# Patient Record
Sex: FEMALE | Race: WHITE | HISPANIC OR LATINO | Employment: UNEMPLOYED | URBAN - METROPOLITAN AREA
[De-identification: names, ages, dates, MRNs, and addresses within clinical notes are randomized per-mention and may not be internally consistent; named-entity substitution may affect disease eponyms.]

---

## 2017-01-06 ENCOUNTER — OFFICE VISIT (OUTPATIENT)
Dept: MATERNAL FETAL MEDICINE | Facility: CLINIC | Age: 27
End: 2017-01-06
Payer: MEDICAID

## 2017-01-06 ENCOUNTER — LAB VISIT (OUTPATIENT)
Dept: LAB | Facility: OTHER | Age: 27
End: 2017-01-06
Attending: OBSTETRICS & GYNECOLOGY
Payer: MEDICAID

## 2017-01-06 VITALS — BODY MASS INDEX: 28.43 KG/M2 | WEIGHT: 155.44 LBS

## 2017-01-06 DIAGNOSIS — Z34.81 ENCOUNTER FOR SUPERVISION OF OTHER NORMAL PREGNANCY IN FIRST TRIMESTER: ICD-10-CM

## 2017-01-06 DIAGNOSIS — Z36.9 ENCOUNTER FOR ANTENATAL SCREENING: ICD-10-CM

## 2017-01-06 DIAGNOSIS — Z36.89 ENCOUNTER FOR FETAL ANATOMIC SURVEY: Primary | ICD-10-CM

## 2017-01-06 PROCEDURE — 76801 OB US < 14 WKS SINGLE FETUS: CPT | Mod: 26,S$PBB,, | Performed by: PEDIATRICS

## 2017-01-06 PROCEDURE — 76813 OB US NUCHAL MEAS 1 GEST: CPT | Mod: 26,S$PBB,, | Performed by: PEDIATRICS

## 2017-01-06 PROCEDURE — 99499 UNLISTED E&M SERVICE: CPT | Mod: S$PBB,,, | Performed by: PEDIATRICS

## 2017-01-06 PROCEDURE — 81508 FTL CGEN ABNOR TWO PROTEINS: CPT

## 2017-01-06 PROCEDURE — 36415 COLL VENOUS BLD VENIPUNCTURE: CPT

## 2017-01-06 NOTE — PROGRESS NOTES
Indication: Nuchal Translucency (Dr Katarzyna Dexter).   Maternal age (26 years).   ____________________________________________________________________________  History: Age: 26 years.  ____________________________________________________________________________  Dating:  LMP: 10/09/16 EDC: 07/16/17 GA by LMP: 12w5d  Current Scan on: 01/06/17 EDC: 07/12/17 GA by current scan: 13w2d  Best Overall Assessment: 12/07/16 EDC: 07/16/17 Assessed GA: 12w5d  The calculation of the gestational age by current scan was based on CRL.  The Best Overall Assessment is based on the LMP.  ____________________________________________________________________________  First Trimester Scan:  Cotton gestation.  Biometry:  CRL 71.5 mm 84th% 13w2d (12w6d to 13w6d)  NT 1.80 mm  Additional Markers for Aneuploidies: Nasal bone present.  Fetal Anatomy:  Skull / Brain: appears normal. Abdomen: appears normal. Stomach: visible. Bladder: visible. Hands: both visible. Feet: both visible.      Fetal heart activity: present. Fetal heart rate: 155 bpm.   Amniotic fluid: normal.   Cord: 3 vessels.     Summary of Ultrasound Findings:  Transabdominal US. U/S machine: [employer].   Impression: normal intrauterine pregnancy.     ____________________________________________________________________________  Maternal Structures:  Uterus: Midline echo: intact.    Right Ovary: not visible.   Left Ovary: not visible.  Cul de Sac / Pouch of Sunil: no free fluid visible.  ____________________________________________________________________________  Report Summary:  Impression:   Single viable intrauterine pregnancy consistent with established dating.  Fetus grossly WNL with normal cardiac activity.    No evidence of nuchal translucency thickening visualized today.  NT is normal and nasal bone is present.  Normal uterus, cervix and adnexae as noted above.     Recommendations: First portion of sequential screening completed today. Results to be sent to  primary pregnancy care provider. Recommend to complete second blood draw beyond 15 weeks EGA of pregnancy. Ultrasound for fetal anatomical survey scheduled by [facility] today for 19-20 weeks EGA.       Thanks once again for allowing us to participate in the care of your patients.  If you have any questions concerning today's consultation feel free to contact me or one of my partners.  We can be reached at [phone] during normal business hours.  If you have a question after normal business hours, please contact Labor and Delivery [phone] and the unit secretary will page our on call physician.

## 2017-01-09 ENCOUNTER — ROUTINE PRENATAL (OUTPATIENT)
Dept: OBSTETRICS AND GYNECOLOGY | Facility: CLINIC | Age: 27
End: 2017-01-09
Attending: OBSTETRICS & GYNECOLOGY
Payer: MEDICAID

## 2017-01-09 VITALS
DIASTOLIC BLOOD PRESSURE: 64 MMHG | SYSTOLIC BLOOD PRESSURE: 108 MMHG | WEIGHT: 152.75 LBS | BODY MASS INDEX: 27.94 KG/M2

## 2017-01-09 DIAGNOSIS — G35 MS (MULTIPLE SCLEROSIS): Primary | ICD-10-CM

## 2017-01-09 DIAGNOSIS — Z34.91 ENCOUNTER FOR SUPERVISION OF NORMAL PREGNANCY IN FIRST TRIMESTER, UNSPECIFIED GRAVIDITY: ICD-10-CM

## 2017-01-09 DIAGNOSIS — R10.2 PELVIC PRESSURE IN FEMALE: ICD-10-CM

## 2017-01-09 DIAGNOSIS — O34.219 PREVIOUS CESAREAN DELIVERY AFFECTING PREGNANCY: ICD-10-CM

## 2017-01-09 DIAGNOSIS — Z23 FLU VACCINE NEED: ICD-10-CM

## 2017-01-09 PROBLEM — Z34.90 SUPERVISION OF NORMAL PREGNANCY: Status: ACTIVE | Noted: 2017-01-09

## 2017-01-09 LAB
B-HCG (M.O.M.) (SS1): NORMAL
CRL MEASUREMENT (SS1): 71.5 MM
DOWN SYNDROME (<1:25) (SS1): NORMAL
DS BASED ON MAT. AGE (SS1): NORMAL
ETHNIC ORIGIN (SS1): NORMAL
GESTATIONAL AGE (DAYS)(SS1): 2
GESTATIONAL AGE (WEEKS)(SS1): 13
HCG (SS1): 67.8 IU/ML
INSULIN DEPEND. DIABETES (SS1): NORMAL
MATERNAL AGE AT EDD (YRS) (SS1): 25
MATERNAL WEIGHT (LBS) (SS1): 155
MULTIPLE GESTATION (SS1): NORMAL
NASAL BONE (SS1): NORMAL
NUCHAL TRANSL. (M.O.M.) (SS1): NORMAL
NUCHAL TRANSLUCENCY (SS1): 1.8 MM
PAPP-A (M.O.M.) (SS1): NORMAL
PAPP-A (SS1): 503.1 NG/ML
SEQ. SCREEN PART 1: NEGATIVE
SEQUENTIAL SCREEN I INTERP.: NORMAL
TRISOMY 18 (<1:100) (SS1): NORMAL
ULTRASOUND DATE (SS1): NORMAL

## 2017-01-09 PROCEDURE — 99999 PR PBB SHADOW E&M-EST. PATIENT-LVL II: CPT | Mod: PBBFAC,,, | Performed by: OBSTETRICS & GYNECOLOGY

## 2017-01-09 PROCEDURE — 99213 OFFICE O/P EST LOW 20 MIN: CPT | Mod: TH,S$PBB,, | Performed by: OBSTETRICS & GYNECOLOGY

## 2017-01-09 PROCEDURE — 87086 URINE CULTURE/COLONY COUNT: CPT

## 2017-01-09 PROCEDURE — 99212 OFFICE O/P EST SF 10 MIN: CPT | Mod: PBBFAC | Performed by: OBSTETRICS & GYNECOLOGY

## 2017-01-09 NOTE — PROGRESS NOTES
Pregnancy at 13w1d with reports of occasional feeling of quickening. Denies LOF or BRB.   Discussed  with risks and benefits.  Advised to take Fe daily

## 2017-01-10 ENCOUNTER — TELEPHONE (OUTPATIENT)
Dept: OBSTETRICS AND GYNECOLOGY | Facility: CLINIC | Age: 27
End: 2017-01-10

## 2017-01-10 LAB — BACTERIA UR CULT: NO GROWTH

## 2017-01-10 NOTE — TELEPHONE ENCOUNTER
Informed pt that serum screening was normal. Pt communicated understanding and denied having additional questions at this moment. Instructed her to call the clinic with additional questions or concerns.

## 2017-01-10 NOTE — TELEPHONE ENCOUNTER
Called, no answer. Left message requesting call back at [phone]. Will inform pt that her serum screening was normal.

## 2017-02-06 ENCOUNTER — LAB VISIT (OUTPATIENT)
Dept: LAB | Facility: OTHER | Age: 27
End: 2017-02-06
Attending: OBSTETRICS & GYNECOLOGY
Payer: MEDICAID

## 2017-02-06 ENCOUNTER — ROUTINE PRENATAL (OUTPATIENT)
Dept: OBSTETRICS AND GYNECOLOGY | Facility: CLINIC | Age: 27
End: 2017-02-06
Attending: OBSTETRICS & GYNECOLOGY
Payer: MEDICAID

## 2017-02-06 VITALS
WEIGHT: 156.06 LBS | DIASTOLIC BLOOD PRESSURE: 74 MMHG | BODY MASS INDEX: 28.55 KG/M2 | SYSTOLIC BLOOD PRESSURE: 122 MMHG

## 2017-02-06 DIAGNOSIS — O99.012 ANEMIA IN PREGNANCY, SECOND TRIMESTER: ICD-10-CM

## 2017-02-06 DIAGNOSIS — G35 MS (MULTIPLE SCLEROSIS): ICD-10-CM

## 2017-02-06 DIAGNOSIS — Z34.82 ENCOUNTER FOR SUPERVISION OF OTHER NORMAL PREGNANCY IN SECOND TRIMESTER: Primary | ICD-10-CM

## 2017-02-06 DIAGNOSIS — Z23 FLU VACCINE NEED: ICD-10-CM

## 2017-02-06 DIAGNOSIS — Z34.82 ENCOUNTER FOR SUPERVISION OF OTHER NORMAL PREGNANCY IN SECOND TRIMESTER: ICD-10-CM

## 2017-02-06 DIAGNOSIS — O34.219 PREVIOUS CESAREAN DELIVERY AFFECTING PREGNANCY: ICD-10-CM

## 2017-02-06 PROCEDURE — 99999 PR PBB SHADOW E&M-EST. PATIENT-LVL II: CPT | Mod: PBBFAC,,, | Performed by: OBSTETRICS & GYNECOLOGY

## 2017-02-06 PROCEDURE — 81511 FTL CGEN ABNOR FOUR ANAL: CPT

## 2017-02-06 PROCEDURE — 99213 OFFICE O/P EST LOW 20 MIN: CPT | Mod: TH,S$PBB,, | Performed by: OBSTETRICS & GYNECOLOGY

## 2017-02-06 PROCEDURE — 36415 COLL VENOUS BLD VENIPUNCTURE: CPT

## 2017-02-06 RX ORDER — FERROUS SULFATE 325(65) MG
325 TABLET ORAL
COMMUNITY
End: 2017-12-08

## 2017-02-06 NOTE — PROGRESS NOTES
Pregnancy at 17w1d with quickening. Needs Second trimester sequential screen. Has anatomy U/S scheduled for Feb 24.

## 2017-02-08 LAB
1ST SAMPLE DATE (SS2): NORMAL
2ND SAMPLE DATE (SS2): NORMAL
ALPHA FETOPROTEIN MATERNAL (SS2): 32.7 NG/ML
DOWN SYNDROME RISK (<1:110) (SS2): NORMAL
ETHNIC ORIGIN (SS2): NORMAL
GEST. AGE (DAYS) 1ST SAMPLE (SS2): 2
GEST. AGE (DAYS) 2ND SAMPLE (SS2): 5
GEST. AGE (WKS) 1ST SAMPLE (SS2): 13
GEST. AGE (WKS) 2ND SAMPLE (SS2): 17
GESTATIONAL AGE METHOD (SS2): NORMAL
HCG (SS2): 29.7 IU/ML
INHIBIN A (SS2): 126.5 PG/ML
INSULIN DEPEND. DIABETES (SS2): NORMAL
M.O.M. AFP  (<2.20) (SS2): 0.81
M.O.M. HCG (SS2): 1.24
M.O.M. INHIBIN A (SS2): 0.77
M.O.M. NUCHAL TRANSLUCENCY (SS2): 1.04
M.O.M. PAPP-A (SS2): 0.42
M.O.M. UNCONJ. ESTRIOL (SS2): 0.97
MATERNAL AGE AT EDD (YRS) (SS2): 25
MATERNAL AGE FOR DOWN (SS2): NORMAL
MATERNAL WEIGHT (LBS) (SS2): 156
MULTIPLE GESTATION (SS2): NORMAL
NASAL BONE (SS2): NORMAL
NUCHAL TRANSLUCENCY (SS2): 1.8 MM
PAPP-A (SS2): 503.1 NG/ML
SEQUENTIAL SCREEN PART 2 INTERP: NORMAL
SEQUENTIAL SCREEN PART 2: NEGATIVE
TRISOMY 18 (<1:100) (SS2): NORMAL
UNCONJUGATED ESTRIOL (SS2): 1.19 NG/ML

## 2017-02-13 ENCOUNTER — TELEPHONE (OUTPATIENT)
Dept: OBSTETRICS AND GYNECOLOGY | Facility: CLINIC | Age: 27
End: 2017-02-13

## 2017-02-24 ENCOUNTER — CLINICAL SUPPORT (OUTPATIENT)
Dept: OBSTETRICS AND GYNECOLOGY | Facility: CLINIC | Age: 27
End: 2017-02-24
Attending: OBSTETRICS & GYNECOLOGY
Payer: MEDICAID

## 2017-02-24 ENCOUNTER — OFFICE VISIT (OUTPATIENT)
Dept: MATERNAL FETAL MEDICINE | Facility: CLINIC | Age: 27
End: 2017-02-24
Attending: OBSTETRICS & GYNECOLOGY
Payer: MEDICAID

## 2017-02-24 DIAGNOSIS — Z23 FLU VACCINE NEED: ICD-10-CM

## 2017-02-24 DIAGNOSIS — Z36.89 ENCOUNTER FOR FETAL ANATOMIC SURVEY: ICD-10-CM

## 2017-02-24 DIAGNOSIS — Z36.89 ENCOUNTER FOR ULTRASOUND TO CHECK FETAL GROWTH: Primary | ICD-10-CM

## 2017-02-24 PROCEDURE — 99999 PR PBB SHADOW E&M-EST. PATIENT-LVL I: CPT | Mod: PBBFAC,,,

## 2017-02-24 PROCEDURE — 99499 UNLISTED E&M SERVICE: CPT | Mod: S$PBB,,, | Performed by: PEDIATRICS

## 2017-02-24 PROCEDURE — 90686 IIV4 VACC NO PRSV 0.5 ML IM: CPT | Mod: PBBFAC | Performed by: OBSTETRICS & GYNECOLOGY

## 2017-02-24 PROCEDURE — 76811 OB US DETAILED SNGL FETUS: CPT | Mod: 26,S$PBB,, | Performed by: PEDIATRICS

## 2017-02-24 PROCEDURE — 99211 OFF/OP EST MAY X REQ PHY/QHP: CPT | Mod: PBBFAC,25

## 2017-02-24 NOTE — PROGRESS NOTES
Indication: Anatomy survey (CHIDI LAUREN).   Maternal age (26 years).   SS 2 negative, DSR 1: 19,000.   ____________________________________________________________________________  History: Age: 26 years.  ____________________________________________________________________________  Dating:  LMP: 10/09/16 EDC: 07/16/17 GA by LMP: 19w5d  Current Scan on: 02/24/17 EDC: 07/13/17 GA by current scan: 20w1d    Best Overall Assessment: 02/24/17 EDC: 07/16/17 Assessed GA: 19w5d    The calculation of the gestational age by current scan was based on BPD, OFD, HC, TCD, AC, FL and HUM.  The Best Overall Assessment is based on the LMP.  ____________________________________________________________________________  General Evaluation:    Fetal heart activity: present. Fetal heart rate: 144 bpm.   Presentation: breech.   Fetal movement: visible.   Amniotic Fluid: normal.   Cord: 3 vessels.   Placenta: left lateral.     ____________________________________________________________________________  Anatomy Scan:  Cotton gestation.      Biometry:    BPD 44.6 mm 40th% 19w3d (18w6d to 20w0d)  .6 mm 48th% 19w6d (18w2d to 21w2d)  .9 mm 90th% 21w3d (20w5d to 22w0d)  FL 33.4 mm 70th% 20w3d (18w5d to 22w2d)  OFD 61.8 mm 81st% 20w2d  TCD 19.8 mm 46th% 19w4d  HUM 31.1 mm 71st% 20w3d  RLV 7.1 mm  CM 3.4 mm 8th%  NUCHAL FOLD 4.69 mm  EFW (lbs/oz) 0 lbs 13 ozs  EFW (g) 377 g  93rd%       Fetal Anatomy:  Visualized with normal appearance: head, neck, skin, chest, abdominal wall, gastrointestinal tract, kidneys, bladder.    Brain: Visualized and normal appearance: cerebral ventricles, choroid plexus, Cisterna Magna, midline falx, cerebellum, cavum septi pellucidi.  Face: profile normal, nose normal, lip normal.  Spine: Sub-optimal  Heart: Visualized and normal appearance: four-chamber view, left outflow tract, right outflow tract, inferior vena cava, superior vena cava. Not visible: Ductal arch.   Angle: to the fetal left.  Four-chamber view: septum appears intact. Aortic arch: Normal.  Genitalia: Female fetus.   Extremities: 4 limbs. Plantar surface of the feet wnl, open hands bilaterally.      Summary of Ultrasound Findings:  Transabdominal US. U/S machine: Med[employer] E10.       Impression: The fetal size is greater than dates suggest.    Comment: No fetal abnormalities are seen.  Marginal cord insertion at 1.9 cm.    ____________________________________________________________________________  Maternal Structures:    Cervical length 31.5 mm.  ____________________________________________________________________________  Report Summary:      Impression:   Incomplete fetal anatomy (spine and 4 chamber NWS) with no obvious abnormalities.     Biometry is consistent and concordant with dating but trending high.     Normal amniotic fluid per qualitative assessment.     Normal placental location without evidence of previa.  Cord insertion is at 1.9 cm.    Normal appearing cervical length per trans-abdominal screen.     Recommendations:   After today's evaluation I recommend a repeat ultrasound assessment in 4 weeks to complete anatomical survey and assess interval fetal growth and overall well-being.     Thank you for allowing us to participate in the care of your patients.  If you have any questions concerning today's consultation, please feel free to contact me or one of my partners.  We can be reached at [phone] during normal business hours.  If you have a question after normal business hours, please contact Labor and Delivery [phone] and the unit secretary will page our on call physician.

## 2017-02-24 NOTE — LETTER
February 24, 2017      Katarzyna Dexter MD  [address]  [address]  [place] [facility] [address]           [facility]  [address]  [place] [facility] [address]  Phone: [phone]          Patient: Alejandra Garcia   MR Number: [id number]   YOB: 1990   Date of Visit: 2/24/2017       Dear Dr. Katarzyna Dexter:    Thank you for referring Alejandra Garcia to me for evaluation. Attached you will find relevant portions of my assessment and plan of care.    If you have questions, please do not hesitate to call me. I look forward to following Alejandra Garcia along with you.    Sincerely,    Brigid Jang MD    Enclosure  CC:  No Recipients    If you would like to receive this communication electronically, please contact [email or web address] or [phone] to request more information on [employer] Link access.    For providers and/or their staff who would like to refer a patient to [facility], please contact us through our one-stop-shop provider referral line, [facility], at [phone].    If you feel you have received this communication in error or would no longer like to receive these types of communications, please e-mail [email or web address]

## 2017-03-06 ENCOUNTER — ROUTINE PRENATAL (OUTPATIENT)
Dept: OBSTETRICS AND GYNECOLOGY | Facility: CLINIC | Age: 27
End: 2017-03-06
Attending: OBSTETRICS & GYNECOLOGY
Payer: MEDICAID

## 2017-03-06 VITALS — DIASTOLIC BLOOD PRESSURE: 72 MMHG | SYSTOLIC BLOOD PRESSURE: 124 MMHG | WEIGHT: 160.5 LBS | BODY MASS INDEX: 29.35 KG/M2

## 2017-03-06 DIAGNOSIS — O34.219 PREVIOUS CESAREAN DELIVERY AFFECTING PREGNANCY: ICD-10-CM

## 2017-03-06 DIAGNOSIS — O34.219 DESIRES VBAC (VAGINAL BIRTH AFTER CESAREAN) TRIAL: ICD-10-CM

## 2017-03-06 DIAGNOSIS — Z34.82 ENCOUNTER FOR SUPERVISION OF OTHER NORMAL PREGNANCY IN SECOND TRIMESTER: Primary | ICD-10-CM

## 2017-03-06 DIAGNOSIS — O99.012 ANEMIA IN PREGNANCY, SECOND TRIMESTER: ICD-10-CM

## 2017-03-06 DIAGNOSIS — G35 MS (MULTIPLE SCLEROSIS): ICD-10-CM

## 2017-03-06 PROCEDURE — 99213 OFFICE O/P EST LOW 20 MIN: CPT | Mod: TH,S$PBB,, | Performed by: OBSTETRICS & GYNECOLOGY

## 2017-03-06 PROCEDURE — 99999 PR PBB SHADOW E&M-EST. PATIENT-LVL II: CPT | Mod: PBBFAC,,, | Performed by: OBSTETRICS & GYNECOLOGY

## 2017-03-06 PROCEDURE — 99212 OFFICE O/P EST SF 10 MIN: CPT | Mod: PBBFAC | Performed by: OBSTETRICS & GYNECOLOGY

## 2017-03-24 ENCOUNTER — OFFICE VISIT (OUTPATIENT)
Dept: MATERNAL FETAL MEDICINE | Facility: CLINIC | Age: 27
End: 2017-03-24
Attending: OBSTETRICS & GYNECOLOGY
Payer: MEDICAID

## 2017-03-24 DIAGNOSIS — Z36.89 ENCOUNTER FOR ULTRASOUND TO CHECK FETAL GROWTH: ICD-10-CM

## 2017-03-24 PROCEDURE — 76816 OB US FOLLOW-UP PER FETUS: CPT | Mod: PBBFAC | Performed by: OBSTETRICS & GYNECOLOGY

## 2017-03-24 PROCEDURE — 99499 UNLISTED E&M SERVICE: CPT | Mod: S$PBB,,, | Performed by: OBSTETRICS & GYNECOLOGY

## 2017-03-24 PROCEDURE — 76816 OB US FOLLOW-UP PER FETUS: CPT | Mod: 26,S$PBB,, | Performed by: OBSTETRICS & GYNECOLOGY

## 2017-03-24 NOTE — LETTER
March 24, 2017      Katarzyna Dexter MD  [address]  [address]  [facility] [address]           [facility]  [address]  [facility] [address]  Phone: [phone]          Patient: Alejandra Garcia   MR Number: [id number]   YOB: 1990   Date of Visit: 3/24/2017       Dear Dr. Katarzyna Dexter:    Thank you for referring Alejandra Garcia to me for evaluation. Attached you will find relevant portions of my assessment and plan of care.    If you have questions, please do not hesitate to call me. I look forward to following Alejandra Garcia along with you.    Sincerely,    Dex Srivastava MD    Enclosure  CC:  No Recipients    If you would like to receive this communication electronically, please contact [email or web address] or [phone] to request more information on [employer] Link access.    For providers and/or their staff who would like to refer a patient to [facility], please contact us through our one-stop-shop provider referral line, [facility], at [phone].    If you feel you have received this communication in error or would no longer like to receive these types of communications, please e-mail [email or web address]

## 2017-03-31 ENCOUNTER — TELEPHONE (OUTPATIENT)
Dept: OBSTETRICS AND GYNECOLOGY | Facility: CLINIC | Age: 27
End: 2017-03-31

## 2017-03-31 NOTE — TELEPHONE ENCOUNTER
Thought that pt's appt was scheduled incorrectly, realized that it was not. Pt will keep Monday 4/3/2017 appt at 3:15 PM. Pt communicated understanding.

## 2017-04-03 ENCOUNTER — ROUTINE PRENATAL (OUTPATIENT)
Dept: OBSTETRICS AND GYNECOLOGY | Facility: CLINIC | Age: 27
End: 2017-04-03
Attending: OBSTETRICS & GYNECOLOGY
Payer: MEDICAID

## 2017-04-03 ENCOUNTER — LAB VISIT (OUTPATIENT)
Dept: LAB | Facility: OTHER | Age: 27
End: 2017-04-03
Attending: OBSTETRICS & GYNECOLOGY
Payer: MEDICAID

## 2017-04-03 VITALS — WEIGHT: 166.25 LBS | SYSTOLIC BLOOD PRESSURE: 118 MMHG | BODY MASS INDEX: 30.4 KG/M2 | DIASTOLIC BLOOD PRESSURE: 76 MMHG

## 2017-04-03 DIAGNOSIS — O34.219 DESIRES VBAC (VAGINAL BIRTH AFTER CESAREAN) TRIAL: ICD-10-CM

## 2017-04-03 DIAGNOSIS — O34.219 PREVIOUS CESAREAN DELIVERY AFFECTING PREGNANCY: ICD-10-CM

## 2017-04-03 DIAGNOSIS — O99.012 ANEMIA IN PREGNANCY, SECOND TRIMESTER: ICD-10-CM

## 2017-04-03 DIAGNOSIS — Z34.82 ENCOUNTER FOR SUPERVISION OF OTHER NORMAL PREGNANCY IN SECOND TRIMESTER: ICD-10-CM

## 2017-04-03 DIAGNOSIS — G35 MS (MULTIPLE SCLEROSIS): ICD-10-CM

## 2017-04-03 DIAGNOSIS — Z34.82 ENCOUNTER FOR SUPERVISION OF OTHER NORMAL PREGNANCY IN SECOND TRIMESTER: Primary | ICD-10-CM

## 2017-04-03 LAB
BASOPHILS # BLD AUTO: 0.01 K/UL
BASOPHILS NFR BLD: 0.1 %
DIFFERENTIAL METHOD: ABNORMAL
EOSINOPHIL # BLD AUTO: 0.3 K/UL
EOSINOPHIL NFR BLD: 2.3 %
ERYTHROCYTE [DISTWIDTH] IN BLOOD BY AUTOMATED COUNT: 13.8 %
GLUCOSE SERPL-MCNC: 133 MG/DL
HCT VFR BLD AUTO: 32.1 %
HGB BLD-MCNC: 10.5 G/DL
LYMPHOCYTES # BLD AUTO: 2.3 K/UL
LYMPHOCYTES NFR BLD: 21.4 %
MCH RBC QN AUTO: 29.2 PG
MCHC RBC AUTO-ENTMCNC: 32.7 %
MCV RBC AUTO: 89 FL
MONOCYTES # BLD AUTO: 0.6 K/UL
MONOCYTES NFR BLD: 5.5 %
NEUTROPHILS # BLD AUTO: 7.6 K/UL
NEUTROPHILS NFR BLD: 70.5 %
PLATELET # BLD AUTO: 332 K/UL
PMV BLD AUTO: 8.8 FL
RBC # BLD AUTO: 3.6 M/UL
WBC # BLD AUTO: 10.79 K/UL

## 2017-04-03 PROCEDURE — 82950 GLUCOSE TEST: CPT

## 2017-04-03 PROCEDURE — 36415 COLL VENOUS BLD VENIPUNCTURE: CPT

## 2017-04-03 PROCEDURE — 85025 COMPLETE CBC W/AUTO DIFF WBC: CPT

## 2017-04-03 PROCEDURE — 99212 OFFICE O/P EST SF 10 MIN: CPT | Mod: TH,S$PBB,, | Performed by: OBSTETRICS & GYNECOLOGY

## 2017-04-03 PROCEDURE — 99999 PR PBB SHADOW E&M-EST. PATIENT-LVL II: CPT | Mod: PBBFAC,,, | Performed by: OBSTETRICS & GYNECOLOGY

## 2017-04-03 NOTE — PROGRESS NOTES
Pregnancy at 25w1d with good FM. Rare Meliton Hansen, denies bleeding or LOF.   GLucose screen today.

## 2017-04-10 ENCOUNTER — TELEPHONE (OUTPATIENT)
Dept: OBSTETRICS AND GYNECOLOGY | Facility: CLINIC | Age: 27
End: 2017-04-10

## 2017-04-10 NOTE — TELEPHONE ENCOUNTER
----- Message from Oralia Sierra sent at 4/10/2017 10:33 AM CDT -----  Contact: Self  25 week Ob pt is returning a phone call regarding some symptoms that she is experiencing. The pt can be reached at [phone]. Thanks KG

## 2017-04-10 NOTE — TELEPHONE ENCOUNTER
----- Message from Nam Degroot sent at 4/7/2017  4:44 PM CDT -----  Contact: pt  x_ 1st Request   _ 2nd Request   _ 3rd Request     Who: BOB KLINE [[id number]]    Why: pt is requesting a call back in reference to feeling warm and earlier today she was shivering.  Pt states that she is 25 wks and wants advise.    What Number to Call Back: [phone]    When to Expect a call back: (Before the end of the day)   -- if call after 3:00 call back will be tomorrow.

## 2017-04-10 NOTE — TELEPHONE ENCOUNTER
Pt says that she no longer has a fever. Pt said that she took a 2 regular strength Tylenol and took a nap and has been feeling better since then. Instructed pt to call us with additional questions or concerns. Pt communicated understanding.

## 2017-04-24 ENCOUNTER — ROUTINE PRENATAL (OUTPATIENT)
Dept: OBSTETRICS AND GYNECOLOGY | Facility: CLINIC | Age: 27
End: 2017-04-24
Attending: OBSTETRICS & GYNECOLOGY
Payer: MEDICAID

## 2017-04-24 VITALS
WEIGHT: 166.88 LBS | SYSTOLIC BLOOD PRESSURE: 110 MMHG | DIASTOLIC BLOOD PRESSURE: 70 MMHG | BODY MASS INDEX: 30.52 KG/M2

## 2017-04-24 DIAGNOSIS — O34.219 PREVIOUS CESAREAN DELIVERY AFFECTING PREGNANCY: ICD-10-CM

## 2017-04-24 DIAGNOSIS — O99.013 ANEMIA IN PREGNANCY, THIRD TRIMESTER: ICD-10-CM

## 2017-04-24 DIAGNOSIS — G35 MS (MULTIPLE SCLEROSIS): ICD-10-CM

## 2017-04-24 DIAGNOSIS — O34.219 DESIRES VBAC (VAGINAL BIRTH AFTER CESAREAN) TRIAL: ICD-10-CM

## 2017-04-24 DIAGNOSIS — Z23 NEED FOR VACCINATION FOR DTP: ICD-10-CM

## 2017-04-24 DIAGNOSIS — Z34.83 ENCOUNTER FOR SUPERVISION OF OTHER NORMAL PREGNANCY IN THIRD TRIMESTER: Primary | ICD-10-CM

## 2017-04-24 PROCEDURE — 99999 PR PBB SHADOW E&M-EST. PATIENT-LVL II: CPT | Mod: PBBFAC,,, | Performed by: OBSTETRICS & GYNECOLOGY

## 2017-04-24 PROCEDURE — 99212 OFFICE O/P EST SF 10 MIN: CPT | Mod: PBBFAC | Performed by: OBSTETRICS & GYNECOLOGY

## 2017-04-24 PROCEDURE — 99213 OFFICE O/P EST LOW 20 MIN: CPT | Mod: TH,S$PBB,, | Performed by: OBSTETRICS & GYNECOLOGY

## 2017-05-15 ENCOUNTER — ROUTINE PRENATAL (OUTPATIENT)
Dept: OBSTETRICS AND GYNECOLOGY | Facility: CLINIC | Age: 27
End: 2017-05-15
Attending: OBSTETRICS & GYNECOLOGY
Payer: MEDICAID

## 2017-05-15 VITALS
BODY MASS INDEX: 30.97 KG/M2 | WEIGHT: 169.31 LBS | DIASTOLIC BLOOD PRESSURE: 62 MMHG | SYSTOLIC BLOOD PRESSURE: 110 MMHG

## 2017-05-15 DIAGNOSIS — O34.219 PREVIOUS CESAREAN DELIVERY AFFECTING PREGNANCY: ICD-10-CM

## 2017-05-15 DIAGNOSIS — Z34.83 ENCOUNTER FOR SUPERVISION OF OTHER NORMAL PREGNANCY IN THIRD TRIMESTER: Primary | ICD-10-CM

## 2017-05-15 DIAGNOSIS — G35 MS (MULTIPLE SCLEROSIS): ICD-10-CM

## 2017-05-15 DIAGNOSIS — O99.013 ANEMIA IN PREGNANCY, THIRD TRIMESTER: ICD-10-CM

## 2017-05-15 DIAGNOSIS — Z23 NEED FOR VACCINATION FOR DTP: ICD-10-CM

## 2017-05-15 DIAGNOSIS — O34.219 DESIRES VBAC (VAGINAL BIRTH AFTER CESAREAN) TRIAL: ICD-10-CM

## 2017-05-15 PROCEDURE — 99212 OFFICE O/P EST SF 10 MIN: CPT | Mod: PBBFAC,25 | Performed by: OBSTETRICS & GYNECOLOGY

## 2017-05-15 PROCEDURE — 99213 OFFICE O/P EST LOW 20 MIN: CPT | Mod: TH,S$PBB,, | Performed by: OBSTETRICS & GYNECOLOGY

## 2017-05-15 PROCEDURE — 99999 PR PBB SHADOW E&M-EST. PATIENT-LVL II: CPT | Mod: PBBFAC,,, | Performed by: OBSTETRICS & GYNECOLOGY

## 2017-05-15 PROCEDURE — 99999 PR PBB SHADOW E&M-EST. PATIENT-LVL I: CPT | Mod: PBBFAC,,,

## 2017-05-15 NOTE — PROGRESS NOTES
Pregnancy at 31w1d with reports of good FM. Denies contrs, LOF or bleeding. Discussed  labor precautions.

## 2017-05-15 NOTE — PROGRESS NOTES
Here for TDAP injection. Patient without complaint of pain at this time ,Injection given. Tolerated well. No pain noted after injection.  Advised to wait in lobby 15 minutes and report any adverse reactions.         Site: RD

## 2017-05-29 ENCOUNTER — ROUTINE PRENATAL (OUTPATIENT)
Dept: OBSTETRICS AND GYNECOLOGY | Facility: CLINIC | Age: 27
End: 2017-05-29
Attending: OBSTETRICS & GYNECOLOGY
Payer: MEDICAID

## 2017-05-29 VITALS
DIASTOLIC BLOOD PRESSURE: 72 MMHG | SYSTOLIC BLOOD PRESSURE: 110 MMHG | BODY MASS INDEX: 31.65 KG/M2 | WEIGHT: 173.06 LBS

## 2017-05-29 DIAGNOSIS — O34.219 PREVIOUS CESAREAN DELIVERY AFFECTING PREGNANCY: ICD-10-CM

## 2017-05-29 DIAGNOSIS — Z34.83 ENCOUNTER FOR SUPERVISION OF OTHER NORMAL PREGNANCY IN THIRD TRIMESTER: Primary | ICD-10-CM

## 2017-05-29 DIAGNOSIS — O99.013 ANEMIA IN PREGNANCY, THIRD TRIMESTER: ICD-10-CM

## 2017-05-29 DIAGNOSIS — O34.219 DESIRES VBAC (VAGINAL BIRTH AFTER CESAREAN) TRIAL: ICD-10-CM

## 2017-05-29 DIAGNOSIS — G35 MS (MULTIPLE SCLEROSIS): ICD-10-CM

## 2017-05-29 PROCEDURE — 99999 PR PBB SHADOW E&M-EST. PATIENT-LVL II: CPT | Mod: PBBFAC,,, | Performed by: OBSTETRICS & GYNECOLOGY

## 2017-05-29 PROCEDURE — 99212 OFFICE O/P EST SF 10 MIN: CPT | Mod: PBBFAC | Performed by: OBSTETRICS & GYNECOLOGY

## 2017-05-29 PROCEDURE — 99213 OFFICE O/P EST LOW 20 MIN: CPT | Mod: TH,S$PBB,, | Performed by: OBSTETRICS & GYNECOLOGY

## 2017-05-29 NOTE — PROGRESS NOTES
Pregnancy at 33w1d with reports of good FM. Rare Summers Hansen contractions. Discussed kick counts, labor precautions. Labs next.

## 2017-06-12 ENCOUNTER — LAB VISIT (OUTPATIENT)
Dept: LAB | Facility: OTHER | Age: 27
End: 2017-06-12
Attending: OBSTETRICS & GYNECOLOGY
Payer: MEDICAID

## 2017-06-12 ENCOUNTER — ROUTINE PRENATAL (OUTPATIENT)
Dept: OBSTETRICS AND GYNECOLOGY | Facility: CLINIC | Age: 27
End: 2017-06-12
Attending: OBSTETRICS & GYNECOLOGY
Payer: MEDICAID

## 2017-06-12 VITALS
DIASTOLIC BLOOD PRESSURE: 72 MMHG | BODY MASS INDEX: 32.22 KG/M2 | WEIGHT: 176.13 LBS | SYSTOLIC BLOOD PRESSURE: 110 MMHG

## 2017-06-12 DIAGNOSIS — Z34.83 ENCOUNTER FOR SUPERVISION OF OTHER NORMAL PREGNANCY IN THIRD TRIMESTER: Primary | ICD-10-CM

## 2017-06-12 DIAGNOSIS — O34.219 DESIRES VBAC (VAGINAL BIRTH AFTER CESAREAN) TRIAL: ICD-10-CM

## 2017-06-12 DIAGNOSIS — Z36.85 ANTENATAL SCREENING FOR STREPTOCOCCUS B: ICD-10-CM

## 2017-06-12 DIAGNOSIS — O34.219 PREVIOUS CESAREAN DELIVERY AFFECTING PREGNANCY: ICD-10-CM

## 2017-06-12 DIAGNOSIS — G35 MS (MULTIPLE SCLEROSIS): ICD-10-CM

## 2017-06-12 DIAGNOSIS — O99.013 ANEMIA IN PREGNANCY, THIRD TRIMESTER: ICD-10-CM

## 2017-06-12 DIAGNOSIS — Z34.83 ENCOUNTER FOR SUPERVISION OF OTHER NORMAL PREGNANCY IN THIRD TRIMESTER: ICD-10-CM

## 2017-06-12 LAB
BASOPHILS # BLD AUTO: 0.02 K/UL
BASOPHILS NFR BLD: 0.2 %
DIFFERENTIAL METHOD: ABNORMAL
EOSINOPHIL # BLD AUTO: 0.2 K/UL
EOSINOPHIL NFR BLD: 1.8 %
ERYTHROCYTE [DISTWIDTH] IN BLOOD BY AUTOMATED COUNT: 13.9 %
HCT VFR BLD AUTO: 34.7 %
HGB BLD-MCNC: 11.5 G/DL
HIV 1+2 AB+HIV1 P24 AG SERPL QL IA: NEGATIVE
LYMPHOCYTES # BLD AUTO: 1.8 K/UL
LYMPHOCYTES NFR BLD: 20.6 %
MCH RBC QN AUTO: 29.8 PG
MCHC RBC AUTO-ENTMCNC: 33.1 %
MCV RBC AUTO: 90 FL
MONOCYTES # BLD AUTO: 0.9 K/UL
MONOCYTES NFR BLD: 10.2 %
NEUTROPHILS # BLD AUTO: 5.9 K/UL
NEUTROPHILS NFR BLD: 66.9 %
PLATELET # BLD AUTO: 296 K/UL
PMV BLD AUTO: 9.9 FL
RBC # BLD AUTO: 3.86 M/UL
RPR SER QL: NORMAL
WBC # BLD AUTO: 8.8 K/UL

## 2017-06-12 PROCEDURE — 99212 OFFICE O/P EST SF 10 MIN: CPT | Mod: TH,S$PBB,, | Performed by: OBSTETRICS & GYNECOLOGY

## 2017-06-12 PROCEDURE — 99212 OFFICE O/P EST SF 10 MIN: CPT | Mod: PBBFAC | Performed by: OBSTETRICS & GYNECOLOGY

## 2017-06-12 PROCEDURE — 87081 CULTURE SCREEN ONLY: CPT

## 2017-06-12 PROCEDURE — 99999 PR PBB SHADOW E&M-EST. PATIENT-LVL II: CPT | Mod: PBBFAC,,, | Performed by: OBSTETRICS & GYNECOLOGY

## 2017-06-12 NOTE — PROGRESS NOTES
Pregnancy at 35w1d with good FM. Mild Meliton Hansen noted. Cultures today for Grp B strep. Consents for delivery,  and transfusion. BLood drawn today.

## 2017-06-14 LAB — BACTERIA SPEC AEROBE CULT: NORMAL

## 2017-06-19 ENCOUNTER — ROUTINE PRENATAL (OUTPATIENT)
Dept: OBSTETRICS AND GYNECOLOGY | Facility: CLINIC | Age: 27
End: 2017-06-19
Attending: OBSTETRICS & GYNECOLOGY
Payer: MEDICAID

## 2017-06-19 VITALS
DIASTOLIC BLOOD PRESSURE: 60 MMHG | WEIGHT: 179.44 LBS | SYSTOLIC BLOOD PRESSURE: 124 MMHG | BODY MASS INDEX: 32.82 KG/M2

## 2017-06-19 DIAGNOSIS — G35 MS (MULTIPLE SCLEROSIS): ICD-10-CM

## 2017-06-19 DIAGNOSIS — O34.219 PREVIOUS CESAREAN DELIVERY AFFECTING PREGNANCY: ICD-10-CM

## 2017-06-19 DIAGNOSIS — O34.219 DESIRES VBAC (VAGINAL BIRTH AFTER CESAREAN) TRIAL: ICD-10-CM

## 2017-06-19 DIAGNOSIS — O99.013 ANEMIA IN PREGNANCY, THIRD TRIMESTER: ICD-10-CM

## 2017-06-19 DIAGNOSIS — Z34.83 ENCOUNTER FOR SUPERVISION OF OTHER NORMAL PREGNANCY IN THIRD TRIMESTER: Primary | ICD-10-CM

## 2017-06-19 PROCEDURE — 99999 PR PBB SHADOW E&M-EST. PATIENT-LVL I: CPT | Mod: PBBFAC,,, | Performed by: OBSTETRICS & GYNECOLOGY

## 2017-06-19 PROCEDURE — 99211 OFF/OP EST MAY X REQ PHY/QHP: CPT | Mod: PBBFAC | Performed by: OBSTETRICS & GYNECOLOGY

## 2017-06-19 PROCEDURE — 99212 OFFICE O/P EST SF 10 MIN: CPT | Mod: TH,S$PBB,, | Performed by: OBSTETRICS & GYNECOLOGY

## 2017-06-19 NOTE — PROGRESS NOTES
Pregnancy at 36w1d with good FM. Rare Meliton Hansen. Discussed risks vs benefits of , wants to try for vaginal delivery. Discussed precautions.

## 2017-06-22 ENCOUNTER — TELEPHONE (OUTPATIENT)
Dept: OBSTETRICS AND GYNECOLOGY | Facility: CLINIC | Age: 27
End: 2017-06-22

## 2017-06-22 NOTE — TELEPHONE ENCOUNTER
Pt said that at her last appt she was told that she is 1-2 cm dilated and wanted to know if it is okay for her to go swimming. Asked, a nurse practitioner for her opinion and was told that is fine. Communicated this information to the pt who communicated understanding. Pt also mentioned that she had some spotting after her cervical check at her last appt earlier this week. Told the pt roberth some spotting is normal and should ease up and stop before a full 7 days pass. Pt communicated understanding. Told pt to call us if the bleeding continues or gets worse. Pt communicated understanding.

## 2017-06-22 NOTE — TELEPHONE ENCOUNTER
----- Message from Jesús Lee sent at 6/22/2017  9:33 AM CDT -----  Contact: Patient  x_ 1st Request  _ 2nd Request  _ 3rd Request    Who: BOB KLINE [[id number]]    Why: Patient is needing advice on going swimming. Patient is needing to speak with staff.    What Number to Call Back: [phone]    When to Expect a call back: (Before the end of the day)  -- if call after 3:00 call back will be tomorrow.

## 2017-06-23 ENCOUNTER — TELEPHONE (OUTPATIENT)
Dept: OBSTETRICS AND GYNECOLOGY | Facility: CLINIC | Age: 27
End: 2017-06-23

## 2017-06-23 NOTE — TELEPHONE ENCOUNTER
Rescheduled pt's 6/26/2017 appt from 11:15 AM to 2:15 PM due to an emergency the doctor must tend to. Pt communicated understanding.

## 2017-06-26 ENCOUNTER — ROUTINE PRENATAL (OUTPATIENT)
Dept: OBSTETRICS AND GYNECOLOGY | Facility: CLINIC | Age: 27
End: 2017-06-26
Attending: OBSTETRICS & GYNECOLOGY
Payer: MEDICAID

## 2017-06-26 VITALS
WEIGHT: 181.19 LBS | SYSTOLIC BLOOD PRESSURE: 124 MMHG | BODY MASS INDEX: 33.15 KG/M2 | DIASTOLIC BLOOD PRESSURE: 76 MMHG

## 2017-06-26 DIAGNOSIS — G35 MS (MULTIPLE SCLEROSIS): ICD-10-CM

## 2017-06-26 DIAGNOSIS — Z34.83 ENCOUNTER FOR SUPERVISION OF OTHER NORMAL PREGNANCY IN THIRD TRIMESTER: Primary | ICD-10-CM

## 2017-06-26 DIAGNOSIS — O34.219 PREVIOUS CESAREAN DELIVERY AFFECTING PREGNANCY: ICD-10-CM

## 2017-06-26 DIAGNOSIS — O34.219 DESIRES VBAC (VAGINAL BIRTH AFTER CESAREAN) TRIAL: ICD-10-CM

## 2017-06-26 PROCEDURE — 99999 PR PBB SHADOW E&M-EST. PATIENT-LVL II: CPT | Mod: PBBFAC,,, | Performed by: OBSTETRICS & GYNECOLOGY

## 2017-06-26 PROCEDURE — 99213 OFFICE O/P EST LOW 20 MIN: CPT | Mod: TH,S$PBB,, | Performed by: OBSTETRICS & GYNECOLOGY

## 2017-06-26 PROCEDURE — 99212 OFFICE O/P EST SF 10 MIN: CPT | Mod: PBBFAC | Performed by: OBSTETRICS & GYNECOLOGY

## 2017-06-26 NOTE — PATIENT INSTRUCTIONS
Pregnancy and Childbirth: What to Bring to the Hospital    Youre likely feeling anxious as your childs birth approaches. This is normal. To give yourself some peace of mind, pack a bag ahead of time. Do this about 1 month ahead of your estimated delivery date. Here is a list of things to remember:  · Personal care items, like a toothbrush, hair brush, lip balm, lotion, and shampoo  · Eyeglasses (if you wear them)  · Nightgown (if you plan to breastfeed, pack 1 that allows for nursing)  · Nursing bra if you plan to breastfeed  · Bathrobe and slippers  · Many hospitals provide maternity underwear, but you may want to bring underwear that can be soiled because you will have bleeding after delivery  · Comfortable clothes for you to wear home, like sweat pants, yoga pants, or other stretchable clothes, because your prepregnancy clothes may not fit after delivery of your baby  · Clothes for your baby to wear home  · Personal music player and headphones  · Camera with new batteries or   · Coins for vending machines  · Telephone numbers of people to call after the birth  · Cell phone and   · Insurance information and any other paperwork needed for your hospital stay  · A list of baby names you are considering  · An infant, rear-facing car seat for bringing home your baby (this is required by law)  Add anything else that you dont want to forget:  _____________________________________  _____________________________________  _____________________________________  _____________________________________  _____________________________________  _____________________________________  _____________________________________  Date Last Reviewed: 8/7/2015  © 2190-0438 [employer]. [address] [place] [address]. All rights reserved. This information is not intended as a substitute for professional medical care. Always follow your healthcare professional's instructions.        Recognizing  Labor    The beginning of labor is the beginning of birth. Youll start to feel strong contractions. Thats when the muscles of your uterus tighten up to help push your baby out during birth.  Yes, labor has probably started   Signs of labor include:  · Your contractions are getting stronger and more painful instead of weaker. Youll probably feel them throughout your whole uterus.  · Your contractions are regular. This means that you feel them about every 5 to 10 minutes. And they are getting closer together.  · You have pink-colored or blood-streaked fluid from your vagina.  · Your water breaks. It may be a gush or a slow trickle of clear fluid from your vagina.  No, its probably not real labor   Signs of false labor include:  · Your contractions arent regular or strong.  · You feel the contractions only in your lower uterus.  · Your contractions go away when you walk or change position.  · Your contractions go away after drinking fluids.  When to call your healthcare provider  Call your healthcare provider or clinic right away if you notice any of these signs:  · Fluid from your vagina, with or without contractions.  · Bleeding heavy enough that you need a sanitary pad.  · You dont feel your baby moving as much as before.     NOTE: Contractions are timed by both of these measures:  · The length of each contraction from its start to its finish.  · How far apart the contractions are -- the time between the start of one contraction and the start of the next contraction.   Date Last Reviewed: 8/9/2015  © 0903-3236 [employer]. [address] [place] [address], [place] [address]. All rights reserved. This information is not intended as a substitute for professional medical care. Always follow your healthcare professional's instructions.

## 2017-07-05 ENCOUNTER — ROUTINE PRENATAL (OUTPATIENT)
Dept: OBSTETRICS AND GYNECOLOGY | Facility: CLINIC | Age: 27
End: 2017-07-05
Payer: MEDICAID

## 2017-07-05 VITALS
DIASTOLIC BLOOD PRESSURE: 72 MMHG | WEIGHT: 177.25 LBS | BODY MASS INDEX: 32.42 KG/M2 | SYSTOLIC BLOOD PRESSURE: 118 MMHG

## 2017-07-05 DIAGNOSIS — Z34.80 SUPERVISION OF OTHER NORMAL PREGNANCY: Primary | ICD-10-CM

## 2017-07-05 PROCEDURE — 99999 PR PBB SHADOW E&M-EST. PATIENT-LVL II: CPT | Mod: PBBFAC,,, | Performed by: NURSE PRACTITIONER

## 2017-07-05 PROCEDURE — 99212 OFFICE O/P EST SF 10 MIN: CPT | Mod: PBBFAC | Performed by: NURSE PRACTITIONER

## 2017-07-05 PROCEDURE — 99212 OFFICE O/P EST SF 10 MIN: CPT | Mod: TH,S$PBB,, | Performed by: NURSE PRACTITIONER

## 2017-07-05 NOTE — PROGRESS NOTES
Here for routine OB appt at 38w3d, with complaints of occasional sharp lower abdominal pains- the pains only last a few seconds.  Reports good FM.  Denies LOF, denies VB, denies contractions.  Reviewed warning signs of Labor and Preeclampsia.  Daily FM counts reinforced.  Peds- Dr. Sifuentes.  Plans to breastfeed- has pump.  F/U scheduled 1 weeks

## 2017-07-10 ENCOUNTER — ANESTHESIA EVENT (OUTPATIENT)
Dept: OBSTETRICS AND GYNECOLOGY | Facility: OTHER | Age: 27
End: 2017-07-10
Payer: MEDICAID

## 2017-07-10 ENCOUNTER — HOSPITAL ENCOUNTER (OUTPATIENT)
Dept: PERINATAL CARE | Facility: OTHER | Age: 27
Discharge: HOME | End: 2017-07-10
Attending: OBSTETRICS & GYNECOLOGY
Payer: MEDICAID

## 2017-07-10 ENCOUNTER — HOSPITAL ENCOUNTER (INPATIENT)
Facility: OTHER | Age: 27
LOS: 3 days | Discharge: HOME | End: 2017-07-13
Attending: OBSTETRICS & GYNECOLOGY | Admitting: OBSTETRICS & GYNECOLOGY
Payer: MEDICAID

## 2017-07-10 ENCOUNTER — ROUTINE PRENATAL (OUTPATIENT)
Dept: OBSTETRICS AND GYNECOLOGY | Facility: CLINIC | Age: 27
End: 2017-07-10
Attending: OBSTETRICS & GYNECOLOGY
Payer: MEDICAID

## 2017-07-10 ENCOUNTER — ANESTHESIA (OUTPATIENT)
Dept: OBSTETRICS AND GYNECOLOGY | Facility: OTHER | Age: 27
End: 2017-07-10
Payer: MEDICAID

## 2017-07-10 VITALS
WEIGHT: 181.69 LBS | SYSTOLIC BLOOD PRESSURE: 130 MMHG | DIASTOLIC BLOOD PRESSURE: 74 MMHG | BODY MASS INDEX: 33.23 KG/M2

## 2017-07-10 DIAGNOSIS — O34.219 PREVIOUS CESAREAN DELIVERY AFFECTING PREGNANCY: ICD-10-CM

## 2017-07-10 DIAGNOSIS — Z34.80 SUPERVISION OF OTHER NORMAL PREGNANCY: Primary | ICD-10-CM

## 2017-07-10 DIAGNOSIS — Z3A.39 39 WEEKS GESTATION OF PREGNANCY: ICD-10-CM

## 2017-07-10 DIAGNOSIS — O34.219 VBAC, DELIVERED: Primary | ICD-10-CM

## 2017-07-10 DIAGNOSIS — Z34.80 SUPERVISION OF OTHER NORMAL PREGNANCY: ICD-10-CM

## 2017-07-10 DIAGNOSIS — G35 MULTIPLE SCLEROSIS: ICD-10-CM

## 2017-07-10 DIAGNOSIS — O47.9 UTERINE CONTRACTIONS DURING PREGNANCY: ICD-10-CM

## 2017-07-10 LAB
BASOPHILS # BLD AUTO: 0.01 K/UL
BASOPHILS NFR BLD: 0.1 %
CREAT UR-MCNC: 55 MG/DL
DIFFERENTIAL METHOD: ABNORMAL
EOSINOPHIL # BLD AUTO: 0.1 K/UL
EOSINOPHIL NFR BLD: 0.8 %
ERYTHROCYTE [DISTWIDTH] IN BLOOD BY AUTOMATED COUNT: 14 %
HCT VFR BLD AUTO: 34.7 %
HGB BLD-MCNC: 11.8 G/DL
LYMPHOCYTES # BLD AUTO: 2.1 K/UL
LYMPHOCYTES NFR BLD: 18.1 %
MCH RBC QN AUTO: 30.4 PG
MCHC RBC AUTO-ENTMCNC: 34 %
MCV RBC AUTO: 89 FL
MONOCYTES # BLD AUTO: 1 K/UL
MONOCYTES NFR BLD: 8.7 %
NEUTROPHILS # BLD AUTO: 8.3 K/UL
NEUTROPHILS NFR BLD: 72.1 %
PLATELET # BLD AUTO: 268 K/UL
PMV BLD AUTO: 10.2 FL
PROT UR-MCNC: 11 MG/DL
PROT/CREAT RATIO, UR: 0.2
RBC # BLD AUTO: 3.88 M/UL
WBC # BLD AUTO: 11.47 K/UL

## 2017-07-10 PROCEDURE — 99999 PR PBB SHADOW E&M-EST. PATIENT-LVL II: CPT | Mod: PBBFAC,,, | Performed by: OBSTETRICS & GYNECOLOGY

## 2017-07-10 PROCEDURE — 99284 EMERGENCY DEPT VISIT MOD MDM: CPT | Mod: 25,,, | Performed by: OBSTETRICS & GYNECOLOGY

## 2017-07-10 PROCEDURE — 59025 FETAL NON-STRESS TEST: CPT | Mod: 26,59,, | Performed by: OBSTETRICS & GYNECOLOGY

## 2017-07-10 PROCEDURE — 86901 BLOOD TYPING SEROLOGIC RH(D): CPT

## 2017-07-10 PROCEDURE — 11000001 HC ACUTE MED/SURG PRIVATE ROOM

## 2017-07-10 PROCEDURE — 27200710 HC EPIDURAL INFUSION PUMP SET: Performed by: ANESTHESIOLOGY

## 2017-07-10 PROCEDURE — 27800517 HC TRAY,EPIDURAL-CONTINUOUS: Performed by: ANESTHESIOLOGY

## 2017-07-10 PROCEDURE — 86900 BLOOD TYPING SEROLOGIC ABO: CPT

## 2017-07-10 PROCEDURE — 59025 FETAL NON-STRESS TEST: CPT | Mod: 26,,, | Performed by: OBSTETRICS & GYNECOLOGY

## 2017-07-10 PROCEDURE — 85025 COMPLETE CBC W/AUTO DIFF WBC: CPT | Mod: 91

## 2017-07-10 PROCEDURE — 76815 OB US LIMITED FETUS(S): CPT | Mod: 26,,, | Performed by: OBSTETRICS & GYNECOLOGY

## 2017-07-10 PROCEDURE — 59025 FETAL NON-STRESS TEST: CPT

## 2017-07-10 PROCEDURE — 99285 EMERGENCY DEPT VISIT HI MDM: CPT | Mod: 25,27

## 2017-07-10 RX ORDER — FENTANYL CITRATE 50 UG/ML
INJECTION, SOLUTION INTRAMUSCULAR; INTRAVENOUS
Status: DISPENSED
Start: 2017-07-10 | End: 2017-07-11

## 2017-07-10 RX ORDER — FENTANYL/BUPIVACAINE/NS/PF 2MCG/ML-.1
PLASTIC BAG, INJECTION (ML) INJECTION
Status: DISPENSED
Start: 2017-07-10 | End: 2017-07-11

## 2017-07-10 RX ORDER — BUPIVACAINE HYDROCHLORIDE 2.5 MG/ML
INJECTION, SOLUTION EPIDURAL; INFILTRATION; INTRACAUDAL
Status: DISPENSED
Start: 2017-07-10 | End: 2017-07-11

## 2017-07-10 RX ORDER — ONDANSETRON 8 MG/1
8 TABLET, ORALLY DISINTEGRATING ORAL EVERY 8 HOURS PRN
Status: DISCONTINUED | OUTPATIENT
Start: 2017-07-10 | End: 2017-07-11

## 2017-07-10 RX ORDER — SODIUM CHLORIDE, SODIUM LACTATE, POTASSIUM CHLORIDE, CALCIUM CHLORIDE 600; 310; 30; 20 MG/100ML; MG/100ML; MG/100ML; MG/100ML
INJECTION, SOLUTION INTRAVENOUS CONTINUOUS
Status: DISCONTINUED | OUTPATIENT
Start: 2017-07-11 | End: 2017-07-11

## 2017-07-10 NOTE — Clinical Note
I certify that Inpatient services for greater than or equal to 2 midnights are medically necessary:: Yes   Transfer To (Destination): [facility] LABOR AND DELIVERY [[id number]]   Diagnosis: 39 weeks gestation of pregnancy [536011]   Admitting Provider:: NASIR SAUCEDO JR [[id number]]   Future Attending Provider: CHIDI LAUREN [[id number]]   Bed Type Preference: Standard [6]   Reason for IP Medical Treatment  (Clinical interventions that can only be accomplished in the IP setting? ) :: Labor   Estimated Length of Stay:: 2 midnights   Plans for Post-Acute care--if anticipated (pick the single best option):: A. No post acute care anticipated at this time

## 2017-07-10 NOTE — PROGRESS NOTES
Pregnancy at 39w1d with good FM. Reports Meliton Hansen, denies H/a, blurred vision or RUQ pain. Will begin testing. , repeat B/P.

## 2017-07-11 LAB
ABO + RH BLD: NORMAL
ALLENS TEST: ABNORMAL
BLD GP AB SCN CELLS X3 SERPL QL: NORMAL
HCO3 UR-SCNC: 24.5 MMOL/L (ref 24–28)
PCO2 BLDA: 59.3 MMHG (ref 35–45)
PH SMN: 7.22 [PH] (ref 7.35–7.45)
PO2 BLDA: <5 MMHG (ref 80–100)
POC BE: -3 MMOL/L
POC SATURATED O2: ABNORMAL %
SAMPLE: ABNORMAL
SITE: ABNORMAL

## 2017-07-11 PROCEDURE — 51702 INSERT TEMP BLADDER CATH: CPT

## 2017-07-11 PROCEDURE — 0UQMXZZ REPAIR VULVA, EXTERNAL APPROACH: ICD-10-PCS | Performed by: OBSTETRICS & GYNECOLOGY

## 2017-07-11 PROCEDURE — 0HQ9XZZ REPAIR PERINEUM SKIN, EXTERNAL APPROACH: ICD-10-PCS | Performed by: OBSTETRICS & GYNECOLOGY

## 2017-07-11 PROCEDURE — 62326 NJX INTERLAMINAR LMBR/SAC: CPT | Performed by: ANESTHESIOLOGY

## 2017-07-11 PROCEDURE — 27000181 HC CABLE, IUPC

## 2017-07-11 PROCEDURE — 11000001 HC ACUTE MED/SURG PRIVATE ROOM

## 2017-07-11 PROCEDURE — 25000003 PHARM REV CODE 250: Performed by: STUDENT IN AN ORGANIZED HEALTH CARE EDUCATION/TRAINING PROGRAM

## 2017-07-11 PROCEDURE — 72100003 HC LABOR CARE, EA. ADDL. 8 HRS

## 2017-07-11 PROCEDURE — 25000003 PHARM REV CODE 250: Performed by: ANESTHESIOLOGY

## 2017-07-11 PROCEDURE — 72200004 HC VAGINAL DELIVERY LEVEL I

## 2017-07-11 PROCEDURE — 99900035 HC TECH TIME PER 15 MIN (STAT)

## 2017-07-11 PROCEDURE — 63600175 PHARM REV CODE 636 W HCPCS: Performed by: ANESTHESIOLOGY

## 2017-07-11 PROCEDURE — 25000003 PHARM REV CODE 250: Performed by: OBSTETRICS & GYNECOLOGY

## 2017-07-11 PROCEDURE — 82803 BLOOD GASES ANY COMBINATION: CPT

## 2017-07-11 PROCEDURE — 72100002 HC LABOR CARE, 1ST 8 HOURS

## 2017-07-11 PROCEDURE — 76815 OB US LIMITED FETUS(S): CPT

## 2017-07-11 PROCEDURE — 10907ZC DRAINAGE OF AMNIOTIC FLUID, THERAPEUTIC FROM PRODUCTS OF CONCEPTION, VIA NATURAL OR ARTIFICIAL OPENING: ICD-10-PCS | Performed by: OBSTETRICS & GYNECOLOGY

## 2017-07-11 RX ORDER — OXYCODONE AND ACETAMINOPHEN 10; 325 MG/1; MG/1
1 TABLET ORAL EVERY 4 HOURS PRN
Status: DISCONTINUED | OUTPATIENT
Start: 2017-07-11 | End: 2017-07-13 | Stop reason: HOSPADM

## 2017-07-11 RX ORDER — SODIUM CITRATE AND CITRIC ACID MONOHYDRATE 334; 500 MG/5ML; MG/5ML
30 SOLUTION ORAL ONCE
Status: DISCONTINUED | OUTPATIENT
Start: 2017-07-11 | End: 2017-07-11

## 2017-07-11 RX ORDER — FENTANYL/BUPIVACAINE/NS/PF 2MCG/ML-.1
PLASTIC BAG, INJECTION (ML) INJECTION CONTINUOUS
Status: DISCONTINUED | OUTPATIENT
Start: 2017-07-11 | End: 2017-07-11

## 2017-07-11 RX ORDER — OXYCODONE AND ACETAMINOPHEN 5; 325 MG/1; MG/1
1 TABLET ORAL EVERY 4 HOURS PRN
Status: DISCONTINUED | OUTPATIENT
Start: 2017-07-11 | End: 2017-07-13 | Stop reason: HOSPADM

## 2017-07-11 RX ORDER — OXYTOCIN/RINGER'S LACTATE 20/1000 ML
41.65 PLASTIC BAG, INJECTION (ML) INTRAVENOUS CONTINUOUS
Status: ACTIVE | OUTPATIENT
Start: 2017-07-11 | End: 2017-07-11

## 2017-07-11 RX ORDER — METOCLOPRAMIDE HYDROCHLORIDE 5 MG/ML
10 INJECTION INTRAMUSCULAR; INTRAVENOUS ONCE
Status: DISCONTINUED | OUTPATIENT
Start: 2017-07-11 | End: 2017-07-11

## 2017-07-11 RX ORDER — ONDANSETRON 8 MG/1
8 TABLET, ORALLY DISINTEGRATING ORAL EVERY 8 HOURS PRN
Status: DISCONTINUED | OUTPATIENT
Start: 2017-07-11 | End: 2017-07-13 | Stop reason: HOSPADM

## 2017-07-11 RX ORDER — HYDROCORTISONE 25 MG/G
CREAM TOPICAL 3 TIMES DAILY PRN
Status: DISCONTINUED | OUTPATIENT
Start: 2017-07-11 | End: 2017-07-13 | Stop reason: HOSPADM

## 2017-07-11 RX ORDER — FENTANYL/BUPIVACAINE/NS/PF 2MCG/ML-.1
PLASTIC BAG, INJECTION (ML) INJECTION CONTINUOUS PRN
Status: DISCONTINUED | OUTPATIENT
Start: 2017-07-11 | End: 2017-07-11

## 2017-07-11 RX ORDER — DIPHENHYDRAMINE HYDROCHLORIDE 50 MG/ML
25 INJECTION INTRAMUSCULAR; INTRAVENOUS EVERY 4 HOURS PRN
Status: DISCONTINUED | OUTPATIENT
Start: 2017-07-11 | End: 2017-07-13 | Stop reason: HOSPADM

## 2017-07-11 RX ORDER — MISOPROSTOL 200 UG/1
600 TABLET ORAL
Status: DISCONTINUED | OUTPATIENT
Start: 2017-07-11 | End: 2017-07-11

## 2017-07-11 RX ORDER — LIDOCAINE HYDROCHLORIDE AND EPINEPHRINE 15; 5 MG/ML; UG/ML
INJECTION, SOLUTION EPIDURAL
Status: DISCONTINUED | OUTPATIENT
Start: 2017-07-11 | End: 2017-07-11

## 2017-07-11 RX ORDER — FENTANYL CITRATE 50 UG/ML
INJECTION, SOLUTION INTRAMUSCULAR; INTRAVENOUS
Status: DISCONTINUED | OUTPATIENT
Start: 2017-07-11 | End: 2017-07-11

## 2017-07-11 RX ORDER — OXYTOCIN/RINGER'S LACTATE 20/1000 ML
2 PLASTIC BAG, INJECTION (ML) INTRAVENOUS CONTINUOUS
Status: DISCONTINUED | OUTPATIENT
Start: 2017-07-11 | End: 2017-07-11

## 2017-07-11 RX ORDER — MISOPROSTOL 200 UG/1
TABLET ORAL
Status: DISPENSED
Start: 2017-07-11 | End: 2017-07-11

## 2017-07-11 RX ORDER — IBUPROFEN 600 MG/1
600 TABLET ORAL EVERY 6 HOURS
Status: DISCONTINUED | OUTPATIENT
Start: 2017-07-11 | End: 2017-07-13 | Stop reason: HOSPADM

## 2017-07-11 RX ORDER — FAMOTIDINE 10 MG/ML
20 INJECTION INTRAVENOUS ONCE
Status: DISCONTINUED | OUTPATIENT
Start: 2017-07-11 | End: 2017-07-11

## 2017-07-11 RX ADMIN — IBUPROFEN 600 MG: 600 TABLET, FILM COATED ORAL at 11:07

## 2017-07-11 RX ADMIN — Medication 5 ML: at 12:07

## 2017-07-11 RX ADMIN — FENTANYL CITRATE 100 MCG: 50 INJECTION, SOLUTION INTRAMUSCULAR; INTRAVENOUS at 12:07

## 2017-07-11 RX ADMIN — SODIUM CHLORIDE, SODIUM LACTATE, POTASSIUM CHLORIDE, AND CALCIUM CHLORIDE: .6; .31; .03; .02 INJECTION, SOLUTION INTRAVENOUS at 12:07

## 2017-07-11 RX ADMIN — LIDOCAINE HYDROCHLORIDE,EPINEPHRINE BITARTRATE 3 ML: 15; .005 INJECTION, SOLUTION EPIDURAL; INFILTRATION; INTRACAUDAL; PERINEURAL at 12:07

## 2017-07-11 RX ADMIN — IBUPROFEN 600 MG: 600 TABLET, FILM COATED ORAL at 05:07

## 2017-07-11 RX ADMIN — Medication 2 MILLI-UNITS/MIN: at 05:07

## 2017-07-11 RX ADMIN — SODIUM CHLORIDE, SODIUM LACTATE, POTASSIUM CHLORIDE, AND CALCIUM CHLORIDE 1000 ML: .6; .31; .03; .02 INJECTION, SOLUTION INTRAVENOUS at 12:07

## 2017-07-11 RX ADMIN — Medication 10 ML/HR: at 12:07

## 2017-07-11 RX ADMIN — OXYCODONE HYDROCHLORIDE AND ACETAMINOPHEN 1 TABLET: 10; 325 TABLET ORAL at 01:07

## 2017-07-11 NOTE — PROGRESS NOTES
"LABOR NOTE    S:  Complaints: No.  Epidural working:  yes  MD to bedside for routine exam     O: /65   Pulse 98   Temp 97.8 °F (36.6 °C)   Resp 18   Ht 5' 2" (1.575 m)   Wt 81.6 kg (180 lb)   LMP 10/09/2016   SpO2 96%   Breastfeeding? No   BMI 32.92 kg/m²       FHT: 150, moderate variability, accels present, occasional variable and early decells, Category 2 - Overall, reactive and Reassuring  CTX: q 3-5 minutes,   SVE: /-1      ASSESSMENT:   26 y.o.  at 39w2d, TOLACing. FHT reassuring.     Active Hospital Problems    Diagnosis  POA    39 weeks gestation of pregnancy [Z3A.39]  Not Applicable    Previous  delivery affecting pregnancy [O34.219]  Yes    Multiple sclerosis [G35]  Yes      Resolved Hospital Problems    Diagnosis Date Resolved POA   No resolved problems to display.       PLAN:    TOLAC  - Continue Close Maternal/Fetal Monitoring  - IUPC placed, continue cautious use of pitocin and careful monitoring as pt is TOLACing.   - Recheck 2 hours or PRN    "

## 2017-07-11 NOTE — HOSPITAL COURSE
7/10/17 - OB ED visit for contractions, subsequent admission to L&D for expectant management.   2017 - PPD #1 s/p . Pt has MS, currently asymptomatic. Doing well, meeting PP milestones.   2017 - PPD #2 s/p . Pt has MS, currently asymptomatic. Doing well, has met PP milestones. D/c home today.

## 2017-07-11 NOTE — HPI
Alejandra Garcia is a 26 y.o.  HispanicF at 39w1d who presented to the OB ED complaining of contractions which started at 4:45 this afternoon. She stated that the contractions were irregular and but increasing in intensity.She reported some mild spotting after a cervical check. She denies LOF.   Fetal Movement: normal. She was seen by Dr. Dexter this morning and was found to 3/40/-3 on exam.      This IUP is complicated by hx of  and desire for  and Multiple Sclerosis.

## 2017-07-11 NOTE — PLAN OF CARE
Problem: Patient Care Overview  Goal: Plan of Care Review  Outcome: Ongoing (interventions implemented as appropriate)  Patients vitals stable. Afebrile. Patient reports positive fetal movement. AROM at 0310, clear fluid. Contractions every 4-7 minutes. Oxytocin started at 0536. Epidural working well. Patient denies pain. Will continue to monitor.

## 2017-07-11 NOTE — PROGRESS NOTES
"LABOR NOTE    S:  Complaints: No.  Epidural working:  yes      O: /74   Pulse 93   Temp 96.6 °F (35.9 °C)   Resp 16   Ht 5' 2" (1.575 m)   Wt 81.6 kg (180 lb)   LMP 10/09/2016   SpO2 96%   Breastfeeding? No   BMI 32.92 kg/m²       FHT: 140, moderate variability, accels present, no decells, Category 1 - Reassuring  CTX: q 3-5 minutes  SVE: 5/80/-2, unchanged since AROM      ASSESSMENT:   26 y.o.  at 39w2d, labor    FHT reassuring    Active Hospital Problems    Diagnosis  POA    39 weeks gestation of pregnancy [Z3A.39]  Not Applicable    Previous  delivery affecting pregnancy [O34.219]  Yes    Multiple sclerosis [G35]  Yes      Resolved Hospital Problems    Diagnosis Date Resolved POA   No resolved problems to display.         PLAN:    Continue Close Maternal/Fetal Monitoring  Will start cautious Pitocin Augmentation per protocol  Recheck 2 hours or PRN    Carlito Cotton M.D.  PGY1 OB/GYN    "

## 2017-07-11 NOTE — PROGRESS NOTES
"LABOR NOTE    S:  Complaints: No.  Epidural working:  yes  MD to bedside for routine exam     O: /75   Pulse 99   Temp 98.7 °F (37.1 °C)   Resp 18   Ht 5' 2" (1.575 m)   Wt 81.6 kg (180 lb)   LMP 10/09/2016   SpO2 98%   Breastfeeding? No   BMI 32.92 kg/m²       FHT: 150, moderate variability, accels present, occasional variable and early decells, Category 2 - Overall, reactive and Reassuring  CTX: q 3-5 minutes,   SVE: 10/complete/+1      ASSESSMENT:   26 y.o.  at 39w2d, TOLACing. FHT reassuring.     Active Hospital Problems    Diagnosis  POA    39 weeks gestation of pregnancy [Z3A.39]  Not Applicable    Previous  delivery affecting pregnancy [O34.219]  Yes    Multiple sclerosis [G35]  Yes      Resolved Hospital Problems    Diagnosis Date Resolved POA   No resolved problems to display.       PLAN:    TOLAC  - Continue Close Maternal/Fetal Monitoring  - IUPC placed, continue cautious use of pitocin and careful monitoring as pt is TOLACing.   - Recheck 2 hours or PRN  - Set her up and let her labor down and plan to deliver   "

## 2017-07-11 NOTE — ASSESSMENT & PLAN NOTE
- Consents signed and to chart  - Admit to Labor and Delivery unit  - Plan for expectant labor management, No cytotec, caution with uterotonics   - Epidural per Anesthesia  - Draw CBC, T&S  - Notify Staff  - Recheck in 2 hrs or PRN  - Post-Partum Hemorrhage risk - low

## 2017-07-11 NOTE — ANESTHESIA PROCEDURE NOTES
Epidural    Patient location during procedure: OB   Reason for block: primary anesthetic   Diagnosis: Active Labor   Start time: 7/11/2017 11:57 AM  Timeout: 7/11/2017 11:55 AM  End time: 7/11/2017 12:03 AM  Surgery related to: Vaginal Delivery  Staffing  Anesthesiologist: TRISTA ODELL  Resident/CRNA: JERAD VARNER  Performed: resident/CRNA   Preanesthetic Checklist  Completed: patient identified, site marked, surgical consent, pre-op evaluation, timeout performed, IV checked, risks and benefits discussed, monitors and equipment checked, anesthesia consent given, hand hygiene performed and patient being monitored  Preparation  Patient position: sitting  Prep: ChloraPrep  Patient monitoring: Pulse Ox and Blood Pressure  Epidural  Skin Anesthetic: lidocaine 1%  Skin Wheal: 3 mL  Administration type: continuous  Approach: midline  Interspace: L4-5  Injection technique: KETAN saline  Needle and Epidural Catheter  Needle type: Tuohy   Needle gauge: 17  Needle length: 3.5 inches  Needle insertion depth: 5 cm  Catheter type: springwound and multi-orifice  Catheter size: 19 G  Catheter at skin depth: 10 cm  Test dose: 3 mL of lidocaine 1.5% with Epi 1-to-200,000  Additional Documentation: incremental injection, negative aspiration for heme and CSF, no paresthesia on injection, no signs/symptoms of IV or SA injection, no significant pain on injection and no significant complaints from patient  Needle localization: anatomical landmarks  Medications:  Bolus administered: 10 mL of 0.125% bupivacaine  Opioid administered: 100 mcg of   fentanyl  Volume per aspiration: 5 mL  Time between aspirations: 5 minutes  Assessment   Dermatomal levels determined by ice  Ease of block: easy  Patient's tolerance of the procedure: comfortable throughout block and no complaints

## 2017-07-11 NOTE — PROGRESS NOTES
"LABOR NOTE    S:  Complaints: No.  Epidural working:  yes      O: /74   Pulse 105   Temp (!) 95.9 °F (35.5 °C)   Resp 18   Ht 5' 2" (1.575 m)   Wt 81.6 kg (180 lb)   LMP 10/09/2016   SpO2 95%   Breastfeeding? No   BMI 32.92 kg/m²       FHT: 135, moderate variability, accels present, no decells, Category 1 - Reassuring   CTX: q 3-5 minutes  SVE: /-2      ASSESSMENT:   26 y.o.  at 39w2d, labor    FHT reassuring    Active Hospital Problems    Diagnosis  POA    39 weeks gestation of pregnancy [Z3A.39]  Not Applicable    Previous  delivery affecting pregnancy [O34.219]  Yes    Multiple sclerosis [G35]  Yes      Resolved Hospital Problems    Diagnosis Date Resolved POA   No resolved problems to display.         PLAN:    Continue Close Maternal/Fetal Monitoring  Consider AROM at next check  Recheck 2 hours or PRN      Carlito Cotton M.D.  PGY1 OB/GYN    "

## 2017-07-11 NOTE — PROGRESS NOTES
"LABOR NOTE    S:  Complaints: No.  Epidural working:  yes      O: /66   Pulse 82   Temp 96.6 °F (35.9 °C)   Resp 18   Ht 5' 2" (1.575 m)   Wt 81.6 kg (180 lb)   LMP 10/09/2016   SpO2 96%   Breastfeeding? No   BMI 32.92 kg/m²       FHT: 140, moderate variability, accels present, no decells, Category 1 - Reassuring  CTX: q 4-5 minutes  SVE: /-2      ASSESSMENT:   26 y.o.  at 39w2d, labor    FHT reassuring    Active Hospital Problems    Diagnosis  POA    39 weeks gestation of pregnancy [Z3A.39]  Not Applicable    Previous  delivery affecting pregnancy [O34.219]  Yes    Multiple sclerosis [G35]  Yes      Resolved Hospital Problems    Diagnosis Date Resolved POA   No resolved problems to display.       PLAN:    Continue Close Maternal/Fetal Monitoring  AROM at 0300  Recheck 2 hours or PRN  Consider low dose pitocin at 0500 check if unchanged    Carlito Cotton M.D.  PGY1 OB/GYN    "

## 2017-07-11 NOTE — SUBJECTIVE & OBJECTIVE
Obstetric HPI:  Patient reports contractions, active fetal movement, reports mild vaginal spotting, No loss of fluid     This pregnancy has been complicated by history of primary LTCS with desire for  and Multiple Sclerosis.    Obstetric History       T1      L1     SAB0   TAB0   Ectopic0   Multiple0   Live Births1       # Outcome Date GA Lbr Ramesh/2nd Weight Sex Delivery Anes PTL Lv   2 Current            1 Term 01/07/15 37w6d  3.459 kg (7 lb 10 oz) F CS-LTranv Spinal N EMMA      Name: Nara      Complications: Breech birth      Apgar1:  9                Apgar5: 9        Past Medical History:   Diagnosis Date    Menarche     Age of onset 12    MS (multiple sclerosis)     Multiple sclerosis      Past Surgical History:   Procedure Laterality Date     SECTION, LOW TRANSVERSE      CHOLECYSTECTOMY         PTA Medications   Medication Sig    ferrous sulfate (IRON, FERROUS SULFATE,) 325 mg (65 mg iron) Tab tablet Take 325 mg by mouth daily with breakfast.    PRENATAL VIT #91/FE FUM/FA/DHA (PRENATAL + DHA ORAL) Take by mouth.       Review of patient's allergies indicates:  No Known Allergies     Family History     Problem Relation (Age of Onset)    Asthma Maternal Grandmother    Diabetes Father    Hypertension Mother, Maternal Grandmother    Lupus Mother        Social History Main Topics    Smoking status: Never Smoker    Smokeless tobacco: Never Used    Alcohol use No    Drug use: No    Sexual activity: Yes     Partners: Male     Birth control/ protection: None     Review of Systems   Constitutional: Negative for chills and fever.   Eyes: Negative for visual disturbance.   Respiratory: Negative for shortness of breath.    Cardiovascular: Negative for chest pain.   Gastrointestinal: Negative for diarrhea, nausea and vomiting.   Genitourinary: Negative for dysuria, hematuria, vaginal bleeding and vaginal discharge.   Skin:  Negative for rash.   Neurological:  Negative for headaches.   Psychiatric/Behavioral: Negative.       Objective:     Vital Signs (Most Recent):  Temp: 97.3 °F (36.3 °C) (07/10/17 1920)  Pulse: 85 (07/10/17 2220)  Resp: 18 (07/10/17 2151)  BP: 112/65 (07/10/17 2220)  SpO2: (!) 93 % (07/10/17 2220) Vital Signs (24h Range):  Temp:  [97.3 °F (36.3 °C)] 97.3 °F (36.3 °C)  Pulse:  [] 85  Resp:  [18] 18  SpO2:  [93 %-96 %] 93 %  BP: (101-132)/(50-76) 112/65        There is no height or weight on file to calculate BMI.    FHT: 130, moderate variability, accels present, no decells, Category 1 - Reassuring  TOCO:Q 2-4 minutes    Physical Exam:   Constitutional: She is oriented to person, place, and time. She appears well-developed and well-nourished. No distress.    HENT:   Head: Normocephalic and atraumatic.    Eyes: Conjunctivae are normal.    Neck: Neck supple.    Cardiovascular: Normal rate.     Pulmonary/Chest: Effort normal. No respiratory distress.        Abdominal: She exhibits distension. There is no rebound and no guarding.    scar noted.     Genitourinary: Vagina normal.               Neurological: She is alert and oriented to person, place, and time.    Skin: Skin is warm and dry. She is not diaphoretic.    Psychiatric: She has a normal mood and affect. Her behavior is normal. Judgment and thought content normal.       Cervix:  Dilation:  4  Effacement:  70%  Station: -2  Presentation: Vertex     Significant Labs:  Lab Results   Component Value Date    GROUPTRH O POS 2016    HEPBSAG Negative 2016    STREPBCULT No Group B Streptococcus isolated 2017       I have personallly reviewed all pertinent lab results from the last 24 hours.

## 2017-07-11 NOTE — ANESTHESIA PREPROCEDURE EVALUATION
.  Alejandra Garcia is a 26 y.o. female with past medical hx significant for MS. Pt has not had a flare since her diagnosis 4 years ago. Pt is attempting to . She desires an epidural.     OB History    Para Term  AB Living   2 1 1     1   SAB TAB Ectopic Multiple Live Births         0 1      # Outcome Date GA Lbr Ramesh/2nd Weight Sex Delivery Anes PTL Lv   2 Current            1 Term 01/07/15 37w6d  3.459 kg (7 lb 10 oz) F CS-LTranv Spinal N EMMA      Complications: Breech birth          Wt Readings from Last 1 Encounters:   07/10/17 1135 82.4 kg (181 lb 10.5 oz)       BP Readings from Last 3 Encounters:   07/10/17 112/65   07/10/17 130/74   17 118/72       Patient Active Problem List   Diagnosis    Demyelinating changes in brain    Skin sensation disturbance    Nystagmus    Hyperreflexia    Multiple sclerosis    Headache(784.0)    Lumbar puncture headache    MS (multiple sclerosis)    Anemia in pregnancy    Supervision of normal pregnancy    Previous  delivery affecting pregnancy    Desires  (vaginal birth after ) trial    39 weeks gestation of pregnancy       Past Surgical History:   Procedure Laterality Date     SECTION, LOW TRANSVERSE      CHOLECYSTECTOMY  2015       Social History     Social History    Marital status:      Spouse name: N/A    Number of children: N/A    Years of education: N/A     Occupational History    Not on file.     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         Chemistry        Component Value Date/Time     07/10/2017 1216    K 3.8 07/10/2017 1216     07/10/2017 1216    CO2 19  (L) 07/10/2017 1216    BUN 7 07/10/2017 1216    CREATININE 0.6 07/10/2017 1216    GLU 88 07/10/2017 1216        Component Value Date/Time    CALCIUM 9.3 07/10/2017 1216    ALKPHOS 135 07/10/2017 1216    AST 15 07/10/2017 1216    ALT 12 07/10/2017 1216    BILITOT 0.4 07/10/2017 1216    ESTGFRAFRICA >60 07/10/2017 1216    EGFRNONAA >60 07/10/2017 1216            Lab Results   Component Value Date    WBC 8.92 07/10/2017    HGB 11.5 (L) 07/10/2017    HCT 34.5 (L) 07/10/2017    MCV 90 07/10/2017     07/10/2017       No results for input(s): INR, PROTIME, APTT in the last 72 hours.    Invalid input(s): PT                                                                                                                       07/10/2017  Alejandra Garcia is a 26 y.o., female.    Anesthesia Evaluation    I have reviewed the Patient Summary Reports.    I have reviewed the Nursing Notes.      Review of Systems  Anesthesia Hx:  Denies Hx of Anesthetic complications  History of prior surgery of interest to airway management or planning:  Denies Personal Hx of Anesthesia complications.   Cardiovascular:  Cardiovascular Normal     Pulmonary:  Pulmonary Normal    Neurological:   MS    Psych:  Psychiatric Normal              Anesthesia Plan  Type of Anesthesia, risks & benefits discussed:  Anesthesia Type:  epidural  Patient's Preference:   Intra-op Monitoring Plan:   Intra-op Monitoring Plan Comments:   Post Op Pain Control Plan:   Post Op Pain Control Plan Comments:   Induction:   IV  Beta Blocker:  Patient is not currently on a Beta-Blocker (No further documentation required).       Informed Consent: Patient understands risks and agrees with Anesthesia plan.  Questions answered. Anesthesia consent signed with patient.  ASA Score: 2     Day of Surgery Review of History & Physical:            Ready For Surgery From Anesthesia Perspective.

## 2017-07-11 NOTE — H&P
[facility]  Obstetrics  History & Physical    Patient Name: Alejandra Garcia  MRN: [id number]  Admission Date: 7/10/2017  Primary Care Provider: Alta Bowie MD    Subjective:     Principal Problem:<principal problem not specified>    History of Present Illness:  Alejandra Garcia is a 26 y.o.  HispanicF at 39w1d who presented to the OB ED complaining of contractions which started at 4:45 this afternoon. She stated that the contractions were irregular and but increasing in intensity.She reported some mild spotting after a cervical check. She denies LOF.   Fetal Movement: normal. She was seen by Dr. Dexter this morning and was found to 3/40/-3 on exam.      This IUP is complicated by hx of  and desire for  and Multiple Sclerosis.    Obstetric HPI:  Patient reports contractions, active fetal movement, reports mild vaginal spotting, No loss of fluid     This pregnancy has been complicated by history of primary LTCS with desire for  and Multiple Sclerosis.    Obstetric History       T1      L1     SAB0   TAB0   Ectopic0   Multiple0   Live Births1       # Outcome Date GA Lbr Ramesh/2nd Weight Sex Delivery Anes PTL Lv   2 Current            1 Term 01/07/15 37w6d  3.459 kg (7 lb 10 oz) F CS-LTranv Spinal N EMMA      Name: Nara      Complications: Breech birth      Apgar1:  9                Apgar5: 9        Past Medical History:   Diagnosis Date    Menarche     Age of onset 12    MS (multiple sclerosis)     Multiple sclerosis      Past Surgical History:   Procedure Laterality Date     SECTION, LOW TRANSVERSE      CHOLECYSTECTOMY         PTA Medications   Medication Sig    ferrous sulfate (IRON, FERROUS SULFATE,) 325 mg (65 mg iron) Tab tablet Take 325 mg by mouth daily with breakfast.    PRENATAL VIT #91/FE FUM/FA/DHA (PRENATAL + DHA ORAL) Take by mouth.       Review of patient's allergies indicates:  No Known Allergies     Family  History     Problem Relation (Age of Onset)    Asthma Maternal Grandmother    Diabetes Father    Hypertension Mother, Maternal Grandmother    Lupus Mother        Social History Main Topics    Smoking status: Never Smoker    Smokeless tobacco: Never Used    Alcohol use No    Drug use: No    Sexual activity: Yes     Partners: Male     Birth control/ protection: None     Review of Systems   Constitutional: Negative for chills and fever.   Eyes: Negative for visual disturbance.   Respiratory: Negative for shortness of breath.    Cardiovascular: Negative for chest pain.   Gastrointestinal: Negative for diarrhea, nausea and vomiting.   Genitourinary: Negative for dysuria, hematuria, vaginal bleeding and vaginal discharge.   Skin:  Negative for rash.   Neurological: Negative for headaches.   Psychiatric/Behavioral: Negative.       Objective:     Vital Signs (Most Recent):  Temp: 97.3 °F (36.3 °C) (07/10/17 1920)  Pulse: 85 (07/10/17 2220)  Resp: 18 (07/10/17 2151)  BP: 112/65 (07/10/17 2220)  SpO2: (!) 93 % (07/10/17 2220) Vital Signs (24h Range):  Temp:  [97.3 °F (36.3 °C)] 97.3 °F (36.3 °C)  Pulse:  [] 85  Resp:  [18] 18  SpO2:  [93 %-96 %] 93 %  BP: (101-132)/(50-76) 112/65        There is no height or weight on file to calculate BMI.    FHT: 130, moderate variability, accels present, no decells, Category 1 - Reassuring  TOCO:Q 2-4 minutes    Physical Exam:   Constitutional: She is oriented to person, place, and time. She appears well-developed and well-nourished. No distress.    HENT:   Head: Normocephalic and atraumatic.    Eyes: Conjunctivae are normal.    Neck: Neck supple.    Cardiovascular: Normal rate.     Pulmonary/Chest: Effort normal. No respiratory distress.        Abdominal: She exhibits distension. There is no rebound and no guarding.    scar noted.     Genitourinary: Vagina normal.               Neurological: She is alert and oriented to person, place, and time.    Skin: Skin is warm and  dry. She is not diaphoretic.    Psychiatric: She has a normal mood and affect. Her behavior is normal. Judgment and thought content normal.       Cervix:  Dilation:  4  Effacement:  70%  Station: -2  Presentation: Vertex     Significant Labs:  Lab Results   Component Value Date    GROUPTRH O POS 2016    HEPBSAG Negative 2016    STREPBCULT No Group B Streptococcus isolated 2017       I have personallly reviewed all pertinent lab results from the last 24 hours.    Assessment/Plan:     26 y.o. female  at 39w2d for:    39 weeks gestation of pregnancy    - Consents signed and to chart  - Admit to Labor and Delivery unit  - Plan for expectant labor management, No cytotec, caution with uterotonics   - Epidural per Anesthesia  - Draw CBC, T&S  - Notify Staff  - Recheck in 2 hrs or PRN  - Post-Partum Hemorrhage risk - low              Previous  delivery affecting pregnancy    - Patient desires         Multiple sclerosis    - Symptoms have not complicated pregnancy  - Currently not taking Rebif during pregnancy            Carlito Maza MD  Obstetrics  [facility]      Eric Roy MD

## 2017-07-11 NOTE — LACTATION NOTE
07/11/17 1700   Maternal Infant Feeding   Maternal Emotional State independent   Time Spent (min) 0-15 min   Breastfeeding Education importance of skin-to-skin contact   Lactation Interventions   Breastfeeding Assistance feeding cue recognition promoted;feeding on demand promoted;support offered   Maternal Breastfeeding Support encouragement offered;lactation counseling provided   Lactation education provided with discussion including latch, frequency, cue based feedings, S2S and I and O. Acknowledged understanding

## 2017-07-11 NOTE — PROGRESS NOTES
"LABOR NOTE    S:  Complaints: No.  Epidural working:  yes  MD to bedside for routine exam     O: /67   Pulse 97   Temp 97.2 °F (36.2 °C)   Resp (P) 18   Ht 5' 2" (1.575 m)   Wt 81.6 kg (180 lb)   LMP 10/09/2016   SpO2 95%   Breastfeeding? No   BMI 32.92 kg/m²       FHT: 140, moderate variability, accels present, occasional variable and early decells, Category 2 - Overall, reactive and Reassuring  CTX: q 3-5 minutes, pit @ 8MU  SVE: 5/80/-2, unchanged. IUPC placed.      ASSESSMENT:   26 y.o.  at 39w2d, TOLACing. FHT reassuring.     Active Hospital Problems    Diagnosis  POA    39 weeks gestation of pregnancy [Z3A.39]  Not Applicable    Previous  delivery affecting pregnancy [O34.219]  Yes    Multiple sclerosis [G35]  Yes      Resolved Hospital Problems    Diagnosis Date Resolved POA   No resolved problems to display.       PLAN:    TOLAC  - Continue Close Maternal/Fetal Monitoring  - IUPC placed, continue cautious use of pitocin and careful monitoring as pt is TOLACing.   - Recheck 2 hours or PRN    Florencia John MD PGY-4   Ob-Gyn Resident   "

## 2017-07-11 NOTE — L&D DELIVERY NOTE
cephalic after approximately 40 minutes of maternal pushing.  Under epidural anesthesia.  Infant delivered OA over intact perineum.  Nuchal x1 reduced at introitus.  Female infant also tolerated the delivery well and was placed on mothers abdomen for skin to skin and bulb suctioning performed.  Cord clamped and cut after 2 minutes  Umbilical arterial gas and venous blood obtained.  Placenta delivered spontaneously and IV pitocin given.  Bilateral periurethral lacerations repaired in a running fashion using a 3-0 chromic gut. 1st degree posterior vaginal laceration repaired in the usual fashion using 2-0 chromic gut and found to be hemostatic. Bilateral sulcal lacerations repaired using 2-0 chromic gut in a running fashion and found to be hemostatic.  Red rubber catheter placed to drain bladder, 100mL drained  Uterine tone noted to be firm. No cervical lacerations.  Patient tolerated delivery well.  EBL 400cc  Staff present for entire procedure.  S/L/N counts correct x2.    Delivery Information for  Shivani Garcia    Birth information:  YOB: 2016   Time of birth: 12:18 PM   Sex: female   Head Delivery Date/Time: 2017 12:18 PM   Delivery type: , Spontaneous   Gestational Age: 39w2d    Delivery Providers    Delivering clinician:  Katarzyna Dexter MD   Other personnel:   Provider Role   MD Patricia Matias, MUSTAPHA Farah, MUSTAPHA Hills RN                [place] Measurements    Weight:  3402 g Length:  50.8 cm   Head circum.:  34.3 cm Chest circum.:  33.7 cm          [place] Assessment    Living status:  Living  Apgars:     1 Minute:   5 Minute:   10 Minute:   15 Minute:   20 Minute:     Skin Color:   1  1       Heart Rate:   2  2       Reflex Irritability:   2  2       Muscle Tone:   2  2       Respiratory Effort:   2  2       Total:   9  9                      Assisted Delivery Details:    Forceps attempted?:  No  Vacuum extractor  attempted?:  No         Shoulder Dystocia    Shoulder dystocia present?:  No           Presentation and Position    Presentation:   Vertex   Position:   Middle    Occiput    Anterior            Interventions/Resuscitation         Cord    Vessels:  3 vessels  Complications:  Nuchal  Nuchal Intervention:  reduced  Nuchal Cord Description:  loose nuchal cord  Number of Loops:  1  Delayed Cord Clamping?:  Yes  Cord Clamped Date/Time:  2017 12:20 PM  Cord Blood Disposition:  Sent with Baby  Gases Sent?:  Yes  Stem Cell Collection (by MD):  No       Placenta    Date and time:  2017 12:25 PM  Removal:  Spontaneous  Appearance:  Intact  Placenta disposition:  discarded           Labor Events:       labor: No     Labor Onset Date/Time: 07/10/2017 19:00     Dilation Complete Date/Time: 2017 11:15     Start Pushing Date/Time: 2017 11:45     Rupture Date/Time:              Rupture type:           Fluid Amount:        Fluid Color:        Fluid Odor:        Membrane Status (PeriCalm): ARM (Artificial Rupture)      Rupture Date/Time (PeriCalm): 2017 03:10:00      Fluid Amount (PeriCalm): Moderate      Fluid Color (PeriCalm): Clear       steroids: None     Antibiotics given for GBS: No     Induction:       Indications for induction:        Augmentation: oxytocin     Indications for augmentation:       Labor complications: None     Additional complications:          Cervical ripening:                     Delivery:      Episiotomy: None     Indication for Episiotomy:       Perineal Lacerations: 1st Repaired:  Yes   Periurethral Laceration: bilateral Repaired: Yes   Labial Laceration:   Repaired:     Sulcus Laceration: bilateral Repaired: Yes   Vaginal Laceration: No Repaired:     Cervical Laceration: No Repaired:     Repair suture:       Repair # of packets: 2     Vaginal delivery QBL (mL): 550      QBL (mL): 0     Combined Blood Loss (mL): 550     Vaginal Sweep Performed: Yes      Surgicount Correct: Yes       Other providers:       Anesthesia    Method:  Epidural          Details (if applicable):  Trial of Labor      Categorization:      Priority:     Indications for :     Incision Type:       Additional  information:  Forceps:    Vacuum:    Breech:    Observed anomalies    Other (Comments):         Staff OB-GYN  I was present, scrubbed in and participated in the entire above procedure.    Katarzyna Burnett MD

## 2017-07-11 NOTE — DISCHARGE INSTRUCTIONS
Breastfeeding Discharge Instructions       Feed the baby at the earliest sign of hunger or comfort  o Hands to mouth, sucking motions  o Rooting or searching for something to suck on  o Dont wait for crying - it is a late sign of hunger and comfort.     The feedings may be 8-12 times per 24hrs and will not follow a schedule   Avoid pacifiers and bottles for the first 4 weeks   Alternate the breast you start the feeding with, or start with the breast that feels the fullest   Switch breasts when the baby takes himself off the breast or falls asleep   Keep offering breasts until the baby looks full, no longer gives hunger signs, and stays asleep when placed on his back in the crib   If the baby is sleepy and wont wake for a feeding, put the baby skin-to-skin dressed in a diaper against the mothers bare chest   Sleep near your baby   The baby should be positioned and latched on to the breast correctly  o Chest-to-chest, chin in the breast  o Babys lips are flipped outward  o Babys mouth is stretched open wide like a shout  o Babys sucking should feel like tugging to the mother  - The baby should be drinking at the breast:  o You should hear swallowing or gulping throughout the feeding  o You should see milk on the babys lips when he comes off the breast  o Your breasts should be softer when the baby is finished feeding  o The baby should look relaxed at the end of feedings  o After the 4th day and your milk is in:  o The babys poop should turn bright yellow and be loose, watery, and seedy  o The baby should have at least 3-4 poops the size of the palm of your hand per day  o The baby should have at least 6-8 wet diapers per day  o The urine should be light yellow in color  You should drink when you are thirsty and eat a healthy diet when you are    hungry.     Take naps to get the rest you need.   Take medications and/or drink alcohol only with permission of your obstetrician    or the babys  pediatrician.  You can also call the [facility] Risk [facility],   ([phone]), Monday-Friday, 8am-5pm Central time, to get the most   up-to-date evidence-based information on the use of medications during   pregnancy and breastfeeding.      The baby should be examined by a pediatrician at 3-5 days of age.  Once your   milk comes in, the baby should be gaining at least ½ - 1oz each day and should be back to birthweight no later than 10-14 days of age.          Community Resources    [facility] Breastfeeding Warmline: 1102703377    Local [facility] clinics: provide incentives and breastpumps to eligible mothers  La Leche [employer] International (LLLI):  mother-to-mother support group website        www.MightyNest.Mgv  Local La Leche League mother-to-mother support groups:        www.[employer].Monitor        [facility]         www.tony@[employer].com  Dr. Naga Jarrett website for latch videos and general information:        www.breastfeedinginc.ca  [facility] is a call center that provides information about the safety of taking medications while breastfeeding.  Call 9-067-860-5721, M-F, 8am-5pm, [place].  International Lactation Consultant Association provides resources for assistance:        www.ilca.org  [place] Breastfeeding [employer] provides informationand resources for parents  and the community    http://[facility] [place]astfeeding.org     [employer] mom provides resources for assistance:196.790.5925        www.nolamom.org  [employer] Healthy Babies:  0-539-519-BABY(0123)  [facility] provides a list of breastfeeding services by zip code:        www.[facility] [www.[facility].[email or web address]]Rallyware.org  [employer]:  [phone] a breastfeeding support group for women of color

## 2017-07-11 NOTE — ED PROVIDER NOTES
Encounter Date: 7/10/2017       History     Chief Complaint   Patient presents with    Contractions     Alejandra Garcia is a 26 y.o.  HispanicF at 39w1d presents complaining of contractions which started at 4:45 this afternoon. She states that the contractions are irregular and but increasing in intensity.She reports some mild spotting after a cervical check this morning She denies LOF.   Fetal Movement: normal. She was seen by Dr. Dexter this morning and was found to 3/40/-3 on exam.     This IUP is complicated by hx of  and desire for  and Multiple Sclerosis.              Review of patient's allergies indicates:  No Known Allergies  Past Medical History:   Diagnosis Date    Menarche     Age of onset 12    MS (multiple sclerosis)     Multiple sclerosis      Past Surgical History:   Procedure Laterality Date     SECTION, LOW TRANSVERSE      CHOLECYSTECTOMY       Family History   Problem Relation Age of Onset    Lupus Mother     Hypertension Mother     Diabetes Father     Hypertension Maternal Grandmother     Asthma Maternal Grandmother     Breast cancer Neg Hx     Colon cancer Neg Hx     Ovarian cancer Neg Hx      Social History   Substance Use Topics    Smoking status: Never Smoker    Smokeless tobacco: Never Used    Alcohol use No     Review of Systems   Constitutional: Negative for fever.   HENT: Negative for sore throat.    Respiratory: Negative for shortness of breath.    Cardiovascular: Negative for chest pain.   Gastrointestinal: Negative for diarrhea, nausea and vomiting.   Genitourinary: Positive for vaginal bleeding (mild spotting). Negative for dysuria and hematuria.   Musculoskeletal: Negative for back pain.   Skin: Negative for rash.   Neurological: Negative for weakness.   Hematological: Does not bruise/bleed easily.   Psychiatric/Behavioral: Negative.        Physical Exam     Initial Vitals [07/10/17 1920]   BP Pulse Resp Temp SpO2    124/70 102 18 97.3 °F (36.3 °C) 96 %      MAP       88         Physical Exam    Constitutional: She appears well-developed and well-nourished. She is not diaphoretic. No distress.   HENT:   Head: Normocephalic and atraumatic.   Eyes: Conjunctivae are normal.   Neck: Neck supple.   Cardiovascular: Normal rate.   Pulmonary/Chest: Breath sounds normal. No respiratory distress.   Abdominal: She exhibits distension (gravid). There is no guarding.   Genitourinary: Vagina normal.   Neurological: She is alert and oriented to person, place, and time.   Skin: Skin is warm and dry.   Psychiatric: She has a normal mood and affect. Her behavior is normal. Judgment and thought content normal.     OB LABOR EXAM:   Pre-Term Labor: No.   Membranes ruptured: No.   Method: Sterile vaginal exam per MD.   Vaginal Bleeding: bloody show.     Dilatation: 4.   Station: -2.   Amniotic Fluid Color: no fluid.           ED Course   Obtain Fetal nonstress test (NST)  Date/Time: 7/10/2017 8:04 PM  Performed by: DARLINE CIFUENTES  Authorized by: DARLINE CIFUENTES     A time out verifies correct patient, procedure, equipment, support staff and site/side marked as required:   Nonstress Test:     Decelerations:  None    Accelerations:  15 bpm    Baseline:  130    Uterine Irritability: Yes      Contractions:  Irregular  Biophysical Profile:     Nonstress Test Interpretation: reactive      Overall Impression:  Reassuring      Labs Reviewed - No data to display          Medical Decision Making:   Differential Diagnosis:   SVE - On admission to OB ED 4/70/-2 after two hours 5/80/-2  NST-  Cat 1 reasssuring  ED Management:  SVE - On admission to OB ED 4/70/-2 after two hours 5/80/-2  NST-  Cat 1 reasssuring              Attending Attestation:   Physician Attestation Statement for Resident:  As the supervising MD   Physician Attestation Statement: I have personally seen and examined this patient.   I agree with the above history. -:   As the supervising MD I  agree with the above PE.    As the supervising MD I agree with the above treatment, course, plan, and disposition.   -:   NST  I independently reviewed the fetal non-stress test with the following interpretation:  130 BPM baseline  Variability: moderate  Accelerations: present  Decelerations: absent  Contractions: Q 2-7 min  Category 1    Clinical Interpretation: reactive    Patient evaluated and found to be stable, agree with resident's assessment of active labor, TOLAC and plan to admit for close follow.  I was personally present during the critical portions of the procedure(s) performed by the resident and was immediately available in the ED to provide services and assistance as needed during the entire procedure.                    ED Course     Clinical Impression:   The primary encounter diagnosis was 39 weeks gestation of pregnancy. Diagnoses of History of  and Uterine contractions during pregnancy were also pertinent to this visit.       - Will admit to L&D for expectant labor management in the context of demonstrated cervical change and contractions.    Carlito Cotton M.D.  PGY1 OB/GYN                   Carlito Maza MD  Resident  17 0014       Belen Recinos MD  17 0031

## 2017-07-12 PROBLEM — O34.219 DESIRES VBAC (VAGINAL BIRTH AFTER CESAREAN) TRIAL: Status: RESOLVED | Noted: 2017-03-06 | Resolved: 2017-07-12

## 2017-07-12 PROBLEM — O34.219 VBAC, DELIVERED: Status: ACTIVE | Noted: 2017-07-12

## 2017-07-12 PROBLEM — Z3A.39 39 WEEKS GESTATION OF PREGNANCY: Status: RESOLVED | Noted: 2017-07-10 | Resolved: 2017-07-12

## 2017-07-12 LAB
BASOPHILS # BLD AUTO: 0.02 K/UL
BASOPHILS NFR BLD: 0.1 %
DIFFERENTIAL METHOD: ABNORMAL
EOSINOPHIL # BLD AUTO: 0.2 K/UL
EOSINOPHIL NFR BLD: 1 %
ERYTHROCYTE [DISTWIDTH] IN BLOOD BY AUTOMATED COUNT: 14.3 %
HCT VFR BLD AUTO: 27.4 %
HGB BLD-MCNC: 9.1 G/DL
LYMPHOCYTES # BLD AUTO: 2.3 K/UL
LYMPHOCYTES NFR BLD: 12.3 %
MCH RBC QN AUTO: 29.9 PG
MCHC RBC AUTO-ENTMCNC: 33.2 %
MCV RBC AUTO: 90 FL
MONOCYTES # BLD AUTO: 1.7 K/UL
MONOCYTES NFR BLD: 9.1 %
NEUTROPHILS # BLD AUTO: 14.6 K/UL
NEUTROPHILS NFR BLD: 77.3 %
PLATELET # BLD AUTO: 218 K/UL
PMV BLD AUTO: 9.7 FL
RBC # BLD AUTO: 3.04 M/UL
WBC # BLD AUTO: 18.85 K/UL

## 2017-07-12 PROCEDURE — 99231 SBSQ HOSP IP/OBS SF/LOW 25: CPT | Mod: ,,, | Performed by: OBSTETRICS & GYNECOLOGY

## 2017-07-12 PROCEDURE — 85025 COMPLETE CBC W/AUTO DIFF WBC: CPT

## 2017-07-12 PROCEDURE — 36415 COLL VENOUS BLD VENIPUNCTURE: CPT

## 2017-07-12 PROCEDURE — 72200005 HC VAGINAL DELIVERY LEVEL II

## 2017-07-12 PROCEDURE — 25000003 PHARM REV CODE 250: Performed by: OBSTETRICS & GYNECOLOGY

## 2017-07-12 PROCEDURE — 11000001 HC ACUTE MED/SURG PRIVATE ROOM

## 2017-07-12 RX ADMIN — IBUPROFEN 600 MG: 600 TABLET, FILM COATED ORAL at 06:07

## 2017-07-12 RX ADMIN — IBUPROFEN 600 MG: 600 TABLET, FILM COATED ORAL at 05:07

## 2017-07-12 RX ADMIN — IBUPROFEN 600 MG: 600 TABLET, FILM COATED ORAL at 11:07

## 2017-07-12 RX ADMIN — HYDROCORTISONE 2.5%: 25 CREAM TOPICAL at 06:07

## 2017-07-12 NOTE — ANESTHESIA POSTPROCEDURE EVALUATION
"Anesthesia Post Evaluation    Patient: Alejandra Garcia    Procedure(s) Performed: * No procedures listed *    Final Anesthesia Type: epidural  Patient location during evaluation: labor & delivery  Patient participation: Yes- Able to Participate  Level of consciousness: awake and alert and oriented  Post-procedure vital signs: reviewed and stable  Pain management: adequate  Airway patency: patent  PONV status at discharge: No PONV  Anesthetic complications: no      Cardiovascular status: hemodynamically stable  Respiratory status: unassisted  Hydration status: euvolemic  Follow-up not needed.        Visit Vitals  BP (!) 106/58   Pulse 98   Temp 36.8 °C (98.3 °F) (Oral)   Resp 18   Ht 5' 2" (1.575 m)   Wt 81.6 kg (180 lb)   LMP 10/09/2016   SpO2 97%   Breastfeeding? Yes   BMI 32.92 kg/m²       Pain/Otto Score: Pain Rating Prior to Med Admin: 1 (7/12/2017  5:54 AM)  Pain Rating Post Med Admin: 2 (7/11/2017  6:50 PM)      "

## 2017-07-12 NOTE — SUBJECTIVE & OBJECTIVE
Hospital course: 7/10/17 - OB ED visit for contractions, subsequent admission to L&D for expectant management.   2017 - PPD #1 s/p . Pt has MS, currently asymptomatic. Doing well, meeting PP milestones.     Interval History:     She is doing well this morning. She is tolerating a regular diet without nausea or vomiting. She is voiding spontaneously. She is ambulating. She has passed flatus, and has not a BM. Vaginal bleeding is mild. She denies fever or chills. Abdominal pain is mild and controlled with oral medications. She is breastfeeding. Female infant.    Objective:     Vital Signs (Most Recent):  Temp: 98.3 °F (36.8 °C) (17)  Pulse: 98 (17)  Resp: 18 (17)  BP: (!) 106/58 (17)  SpO2: 97 % (17) Vital Signs (24h Range):  Temp:  [98 °F (36.7 °C)-98.8 °F (37.1 °C)] 98.3 °F (36.8 °C)  Pulse:  [] 98  Resp:  [18] 18  SpO2:  [79 %-99 %] 97 %  BP: (106-147)/(50-81) 106/58     Weight: 81.6 kg (180 lb)  Body mass index is 32.92 kg/m².      Intake/Output Summary (Last 24 hours) at 17 0939  Last data filed at 17 0200   Gross per 24 hour   Intake             1850 ml   Output             1200 ml   Net              650 ml       Significant Labs:  Lab Results   Component Value Date    GROUPTRH O POS 07/10/2017    HEPBSAG Negative 2016    STREPBCULT No Group B Streptococcus isolated 2017       Recent Labs  Lab 17   HGB 9.1*   HCT 27.4*       CBC:   Recent Labs  Lab 17   WBC 18.85*   RBC 3.04*   HGB 9.1*   HCT 27.4*      MCV 90   MCH 29.9   MCHC 33.2     I have personallly reviewed all pertinent lab results from the last 24 hours.    Physical Exam:   Constitutional: She is oriented to person, place, and time. She appears well-developed and well-nourished. No distress.    HENT:   Head: Normocephalic and atraumatic.    Eyes: EOM are normal. No scleral icterus.    Neck: Normal range of motion.     Cardiovascular: Normal rate, regular rhythm, normal heart sounds and intact distal pulses.  Exam reveals no cyanosis.     Pulmonary/Chest: Effort normal and breath sounds normal.        Abdominal: Soft. Bowel sounds are normal.     Genitourinary: Vagina normal and uterus normal.               Neurological: She is alert and oriented to person, place, and time.    Skin: Skin is warm and dry. She is not diaphoretic. No cyanosis.    Psychiatric: She has a normal mood and affect. Her behavior is normal. Judgment and thought content normal.

## 2017-07-12 NOTE — DISCHARGE SUMMARY
[facility]  Obstetrics  Discharge Summary      Patient Name: Alejandra Garcia  MRN: [id number]  Admission Date: 7/10/2017  Hospital Length of Stay: 2 days  Discharge Date and Time:  2017 10:46 AM  Attending Physician: Katarzyna Dexter MD   Discharging Provider: Shahzad Moreno MD  Primary Care Provider: Alta Bowie MD    HPI: Alejandra Garcia is a 26 y.o.  HispanicF at 39w1d who presented to the OB ED complaining of contractions which started at 4:45 this afternoon. She stated that the contractions were irregular and but increasing in intensity.She reported some mild spotting after a cervical check. She denies LOF.   Fetal Movement: normal. She was seen by Dr. Dexter this morning and was found to 3/40/-3 on exam.      This IUP is complicated by hx of  and desire for  and Multiple Sclerosis.    * No surgery found *     Hospital Course:   7/10/17 - OB ED visit for contractions, subsequent admission to L&D for expectant management.   2017 - PPD #1 s/p . Pt has MS, currently asymptomatic. Doing well, meeting PP milestones.     Consults         Status Ordering Provider     Consult to Lactation  Use PRN     Provider:  (Not yet assigned)    ELIEZER Mims          Final Active Diagnoses:    Diagnosis Date Noted POA    PRINCIPAL PROBLEM:  , delivered [O34.219] 2017 Unknown    Previous  delivery affecting pregnancy [O34.219] 2017 Yes    Anemia in pregnancy [O99.019] 11/10/2014 Yes    Multiple sclerosis [G35] 2013 Yes      Problems Resolved During this Admission:    Diagnosis Date Noted Date Resolved POA    Indication for care in labor and delivery, antepartum [O75.9] 2017 Yes    39 weeks gestation of pregnancy [Z3A.39] 07/10/2017 2017 Not Applicable    Desires  (vaginal birth after ) trial [O34.219] 2017 Yes        Labs:   CBC   Recent Labs  Lab 07/10/17  1216  07/10/17  2330 17  0028   WBC 8.92 11.47 18.85*   HGB 11.5* 11.8* 9.1*   HCT 34.5* 34.7* 27.4*    268 218       Feeding Method: breast    Immunizations     Date Immunization Status Dose Route/Site Given by    17 0802 MMR Deferred (Other) 0.5 mL Subcutaneous/Left deltoid Sari Vázquez RN    17 08 Tdap Deferred (Other) 0.5 mL Intramuscular/Left deltoid Sari Vázquez RN          Delivery:    Episiotomy: None   Lacerations: 1st   Repair suture:     Repair # of packets: 2   Blood loss (ml): 550     Birth information:  YOB: 2016   Time of birth: 12:18 PM   Sex: female   Delivery type: , Spontaneous   Gestational Age: 39w2d    Delivery Clinician:      Other providers:       Additional  information:  Forceps:    Vacuum:    Breech:    Observed anomalies      Living?:           APGARS  One minute Five minutes Ten minutes   Skin color:         Heart rate:         Grimace:         Muscle tone:         Breathing:         Totals: 9  9        Placenta: Delivered:       appearance    Pending Diagnostic Studies:     None          Discharged Condition: good    Disposition:     Follow Up:  Follow-up Information     Katarzyna Dexter MD In 6 weeks.    Specialties:  Obstetrics, Obstetrics and Gynecology  Contact information:  [address] [place] [id number] [phone]                 Patient Instructions:   No discharge procedures on file.  Medications:  Current Discharge Medication List      CONTINUE these medications which have NOT CHANGED    Details   ferrous sulfate (IRON, FERROUS SULFATE,) 325 mg (65 mg iron) Tab tablet Take 325 mg by mouth daily with breakfast.      PRENATAL VIT #91/FE FUM/FA/DHA (PRENATAL + DHA ORAL) Take by mouth.             Shahzad Moreno MD  Obstetrics  [facility]

## 2017-07-12 NOTE — PLAN OF CARE
Problem: Patient Care Overview  Goal: Plan of Care Review  Outcome: Ongoing (interventions implemented as appropriate)  Pt tolerating regular diet, ambulated to BR x1 with assistance, instructed to call RN for next void, voiding, caring for infant and breastfeeding,  pain controlled.

## 2017-07-12 NOTE — ASSESSMENT & PLAN NOTE
- Symptoms have not complicated pregnancy  - Currently not taking Rebif during pregnancy  - To f/u with PCP regarding restart of Rebif

## 2017-07-12 NOTE — PLAN OF CARE
Problem: Patient Care Overview  Goal: Plan of Care Review  Outcome: Ongoing (interventions implemented as appropriate)  Pt tolerating regular diet, ambulating well, voiding, caring for infant and breastfeeding,  pain well controlled. VSS

## 2017-07-12 NOTE — ASSESSMENT & PLAN NOTE
Postpartum care:  - Patient doing well. Continue routine management and advances.  - Continue PO pain meds. Pain well controlled.  - Encourage ambulation.   - Heme: Pre Delivery h/h 11/34 --> Post Delivery h/h 9/27  - Contraception - defer to 6 week f/u  - Lactation - The patient is breast feeding. Lactation nurse following along PRN  - Rh Status - POS

## 2017-07-12 NOTE — LACTATION NOTE
"   07/12/17 1000   Maternal Infant Assessment   Breast Shape round   Areola elastic   Nipple(s) everted   Infant Assessment   Sucking Reflex present   Rooting Reflex present   Swallow Reflex present   LATCH Score   Latch 2-->grasps breast, tongue down, lips flanged, rhythmic sucking   Audible Swallowing 2-->spontaneous and intermittent (24 hrs old)   Type Of Nipple 2-->everted (after stimulation)   Comfort (Breast/Nipple) 2-->soft/nontender   Hold (Positioning) 1-->minimal assist, teach one side: mother does other, staff holds   Score (less than 7 for 2/more consecutive times, consult Lactation Consultant) 9       Number Scale   Presence of Pain denies   Location nipple(s)   Pain Rating: Rest 0   Pain Rating: Activity 0   Maternal Infant Feeding   Maternal Emotional State independent   Infant Positioning clutch/"football"   Time Spent (min) 0-15 min   Latch Assistance yes   Breastfeeding Education adequate infant intake   Feeding Infant   Effective Latch During Feeding yes   Audible Swallow yes   Lactation Interventions   Breastfeeding Assistance assisted with positioning;feeding cue recognition promoted;feeding on demand promoted   Maternal Breastfeeding Support diary/feeding log utilized;encouragement offered;lactation counseling provided   Latch Promotion positioning assisted   Latch assistance provided, infant latching well with nutritive sucking noted.  "

## 2017-07-13 VITALS
WEIGHT: 180 LBS | TEMPERATURE: 98 F | RESPIRATION RATE: 18 BRPM | OXYGEN SATURATION: 94 % | BODY MASS INDEX: 33.13 KG/M2 | DIASTOLIC BLOOD PRESSURE: 62 MMHG | HEART RATE: 87 BPM | SYSTOLIC BLOOD PRESSURE: 109 MMHG | HEIGHT: 62 IN

## 2017-07-13 PROCEDURE — 99238 HOSP IP/OBS DSCHRG MGMT 30/<: CPT | Mod: ,,, | Performed by: OBSTETRICS & GYNECOLOGY

## 2017-07-13 PROCEDURE — 25000003 PHARM REV CODE 250: Performed by: OBSTETRICS & GYNECOLOGY

## 2017-07-13 RX ORDER — IBUPROFEN 600 MG/1
600 TABLET ORAL EVERY 6 HOURS
Qty: 60 TABLET | Refills: 3 | Status: SHIPPED | OUTPATIENT
Start: 2017-07-13 | End: 2017-08-25

## 2017-07-13 RX ADMIN — IBUPROFEN 600 MG: 600 TABLET, FILM COATED ORAL at 06:07

## 2017-07-13 NOTE — PROGRESS NOTES
[facility]  Obstetrics  Postpartum Progress Note    Patient Name: Alejandra Garcia  MRN: [id number]  Admission Date: 7/10/2017  Hospital Length of Stay: 3 days  Attending Physician: Katarzyna Dexter MD  Primary Care Provider: Alta Bowie MD    Subjective:     Principal Problem:, delivered    Hospital course: 7/10/17 - OB ED visit for contractions, subsequent admission to L&D for expectant management.   2017 - PPD #1 s/p . Pt has MS, currently asymptomatic. Doing well, meeting PP milestones.   2017 - PPD #2 s/p . Pt has MS, currently asymptomatic. Doing well, has met PP milestones. D/c home today.    Interval History:     She is doing well this morning. She is tolerating a regular diet without nausea or vomiting. She is voiding spontaneously. She is ambulating. She has passed flatus, and has not a BM. Vaginal bleeding is mild. She denies fever or chills. Abdominal pain is mild and controlled with oral medications. She is breastfeeding. Female infant.    Objective:     Vital Signs (Most Recent):  Temp: 98.8 °F (37.1 °C) (17)  Pulse: 100 (17)  Resp: 18 (17)  BP: (!) 108/57 (17)  SpO2: 98 % (17) Vital Signs (24h Range):  Temp:  [97.7 °F (36.5 °C)-98.9 °F (37.2 °C)] 98.8 °F (37.1 °C)  Pulse:  [] 100  Resp:  [18] 18  SpO2:  [97 %-98 %] 98 %  BP: (108-116)/(57-63) 108/57     Weight: 81.6 kg (180 lb)  Body mass index is 32.92 kg/m².      Intake/Output Summary (Last 24 hours) at 17 0703  Last data filed at 17 1700   Gross per 24 hour   Intake              700 ml   Output              600 ml   Net              100 ml       Significant Labs:  Lab Results   Component Value Date    GROUPTRH O POS 07/10/2017    HEPBSAG Negative 2016    STREPBCULT No Group B Streptococcus isolated 2017       Recent Labs  Lab 17   HGB 9.1*   HCT 27.4*       CBC:     Recent Labs  Lab 17   WBC  18.85*   RBC 3.04*   HGB 9.1*   HCT 27.4*      MCV 90   MCH 29.9   MCHC 33.2     I have personallly reviewed all pertinent lab results from the last 24 hours.    Physical Exam:   Constitutional: She is oriented to person, place, and time. She appears well-developed and well-nourished. No distress.    HENT:   Head: Normocephalic and atraumatic.    Eyes: EOM are normal. No scleral icterus.    Neck: Normal range of motion.    Cardiovascular: Normal rate, regular rhythm, normal heart sounds and intact distal pulses.  Exam reveals no cyanosis.     Pulmonary/Chest: Effort normal and breath sounds normal.        Abdominal: Soft. Bowel sounds are normal. There is tenderness (mild and appropriate). There is no rebound and no guarding.   Uterine fundus firm and below umbilicus.                   Neurological: She is alert and oriented to person, place, and time.    Skin: Skin is warm and dry. She is not diaphoretic. No cyanosis.    Psychiatric: She has a normal mood and affect. Her behavior is normal. Judgment and thought content normal.       Assessment/Plan:     26 y.o. female  for:    Previous  delivery affecting pregnancy    Successful         Anemia in pregnancy    - PT on iron and colace..  - Advised to continue iron + PNV after discharge.        Multiple sclerosis    - Symptoms have not complicated pregnancy  - Currently not taking Rebif during pregnancy  - To f/u with PCP regarding restart of Rebif        * , delivered    Postpartum care:  - Patient doing well. Continue routine management and advances.  - Continue PO pain meds. Pain well controlled.  - Encourage ambulation.   - Heme: Pre Delivery h/h  --> Post Delivery h/h . Pt taking iron.  - Contraception - defer to 6 week f/u  - Lactation - The patient is breast feeding. Lactation nurse following along PRN  - Rh Status - POS  - home today.                    Disposition: As patient has met all PP milestones, will plan to  discharge today.    Shahzad Moreno MD  Obstetrics  [facility] [place]

## 2017-07-13 NOTE — LACTATION NOTE
"Lactation rounds. Discharge instructions reviewed with patient, including contact numbers and available resources. Mother reports feedings are going well and denies pain. Infant weight and output adequate. Reviewed what to expect as milk is coming in, how to tell baby is getting enough, manual expression of breastmilk, cue based feeding on demand, skin to skin, etc. Encouraged to call with any questions or concerns. Mother voices understanding.     07/13/17 1025   Maternal Infant Assessment   Areola elastic   Nipple(s) everted   LATCH Score   Latch 2-->grasps breast, tongue down, lips flanged, rhythmic sucking   Audible Swallowing 1-->a few with stimulation   Type Of Nipple 2-->everted (after stimulation)   Comfort (Breast/Nipple) 1-->filling, red/small blisters/bruises, mild/mod discomfort   Hold (Positioning) 2-->no assist from staff, mother able to position/hold infant   Score (less than 7 for 2/more consecutive times, consult Lactation Consultant) 8   Maternal Infant Feeding   Maternal Emotional State independent;relaxed   Infant Positioning clutch/"football"   Signs of Milk Transfer audible swallow;infant jaw motion present   Time Spent (min) 15-30 min   Latch Assistance no   Feeding Infant   Effective Latch During Feeding yes   Audible Swallow yes   Suck/Swallow Coordination present     "

## 2017-07-13 NOTE — PLAN OF CARE
Problem: Breastfeeding (Adult,Obstetrics,Pediatric)  Goal: Signs and Symptoms of Listed Potential Problems Will be Absent, Minimized or Managed (Breastfeeding)  Signs and symptoms of listed potential problems will be absent, minimized or managed by discharge/transition of care (reference Breastfeeding (Adult,Obstetrics,Pediatric) CPG).   Outcome: Ongoing (interventions implemented as appropriate)  Patient will effectively breastfeed infant with comfortable, effective latch in response to infant's feeding cues 8 or more times per 24 hour period and will establish milk supply adequate to meet infant's nutritional needs. Patient will contact lactation as needed for assistance or with any questions or concerns.

## 2017-07-13 NOTE — DISCHARGE SUMMARY
[facility]  Obstetrics  Discharge Summary      Patient Name: Alejandra Garcia  MRN: [id number]  Admission Date: 7/10/2017  Hospital Length of Stay: 3 days  Discharge Date and Time:  2017 7:12 AM  Attending Physician: Katarzyna Dexter MD   Discharging Provider: Shahzad Morneo MD  Primary Care Provider: Alta Bowie MD    HPI: Alejandra Garcia is a 26 y.o.  HispanicF at 39w1d who presented to the OB ED complaining of contractions which started at 4:45 this afternoon. She stated that the contractions were irregular and but increasing in intensity.She reported some mild spotting after a cervical check. She denies LOF.   Fetal Movement: normal. She was seen by Dr. Dexter this morning and was found to 3/40/-3 on exam.      This IUP is complicated by hx of  and desire for  and Multiple Sclerosis.    * No surgery found *     Hospital Course:   7/10/17 - OB ED visit for contractions, subsequent admission to L&D for expectant management.   2017 - PPD #1 s/p . Pt has MS, currently asymptomatic. Doing well, meeting PP milestones.   2017 - PPD #2 s/p . Pt has MS, currently asymptomatic. Doing well, has met PP milestones. D/c home today.    Consults         Status Ordering Provider     Consult to Lactation  Use PRN     Provider:  (Not yet assigned)    Acknowledged ELIEZER KIRK          Final Active Diagnoses:    Diagnosis Date Noted POA    PRINCIPAL PROBLEM:  , delivered [O34.219] 2017 Unknown    Previous  delivery affecting pregnancy [O34.219] 2017 Yes    Anemia in pregnancy [O99.019] 11/10/2014 Yes    Multiple sclerosis [G35] 2013 Yes      Problems Resolved During this Admission:    Diagnosis Date Noted Date Resolved POA    Indication for care in labor and delivery, antepartum [O75.9] 2017 Yes    39 weeks gestation of pregnancy [Z3A.39] 07/10/2017 2017 Not Applicable    Desires  (vaginal birth  after ) trial [O34.219] 2017 Yes        Labs:     Recent Labs  Lab 17  0028   WBC 18.85*   HGB 9.1*   HCT 27.4*          Feeding Method: breast    Immunizations     Date Immunization Status Dose Route/Site Given by    17 MMR Deferred (Other) 0.5 mL Subcutaneous/Left deltoid Sari Vázquez RN    17 Tdap Deferred (Other) 0.5 mL Intramuscular/Left deltoid Sari Vázquez RN          Delivery:    Episiotomy: None   Lacerations: 1st   Repair suture:     Repair # of packets: 2   Blood loss (ml): 550     Birth information:  YOB: 2016   Time of birth: 12:18 PM   Sex: female   Delivery type: , Spontaneous   Gestational Age: 39w2d    Delivery Clinician:      Other providers:       Additional  information:  Forceps:    Vacuum:    Breech:    Observed anomalies      Living?:           APGARS  One minute Five minutes Ten minutes   Skin color:         Heart rate:         Grimace:         Muscle tone:         Breathing:         Totals: 9  9        Placenta: Delivered:       appearance    Pending Diagnostic Studies:     None          Discharged Condition: good    Disposition: Home or Self Care    Follow Up:  Follow-up Information     Katarzyna Dexter MD In 6 weeks.    Specialties:  Obstetrics, Obstetrics and Gynecology  Why:  Routine PP   Contact information:  [address] [address] [phone]                 Patient Instructions:     Diet general     Activity as tolerated     Call MD for:  temperature >100.4     Call MD for:  persistent nausea and vomiting or diarrhea     Call MD for:  redness, tenderness, or signs of infection (pain, swelling, redness, odor or green/yellow discharge around incision site)     Call MD for:  severe uncontrolled pain     No dressing needed       Medications:  Current Discharge Medication List      START taking these medications    Details   ibuprofen (ADVIL,MOTRIN) 600 MG tablet Take 1 tablet (600  mg total) by mouth every 6 (six) hours.  Qty: 60 tablet, Refills: 3         CONTINUE these medications which have NOT CHANGED    Details   ferrous sulfate (IRON, FERROUS SULFATE,) 325 mg (65 mg iron) Tab tablet Take 325 mg by mouth daily with breakfast.      PRENATAL VIT #91/FE FUM/FA/DHA (PRENATAL + DHA ORAL) Take by mouth.             Shahzad Moreno MD  Obstetrics  [facility]

## 2017-07-13 NOTE — SUBJECTIVE & OBJECTIVE
Hospital course: 7/10/17 - OB ED visit for contractions, subsequent admission to L&D for expectant management.   2017 - PPD #1 s/p . Pt has MS, currently asymptomatic. Doing well, meeting PP milestones.   2017 - PPD #2 s/p . Pt has MS, currently asymptomatic. Doing well, has met PP milestones. D/c home today.    Interval History:     She is doing well this morning. She is tolerating a regular diet without nausea or vomiting. She is voiding spontaneously. She is ambulating. She has passed flatus, and has not a BM. Vaginal bleeding is mild. She denies fever or chills. Abdominal pain is mild and controlled with oral medications. She is breastfeeding. Female infant.    Objective:     Vital Signs (Most Recent):  Temp: 98.8 °F (37.1 °C) (17)  Pulse: 100 (17)  Resp: 18 (17)  BP: (!) 108/57 (17)  SpO2: 98 % (17) Vital Signs (24h Range):  Temp:  [97.7 °F (36.5 °C)-98.9 °F (37.2 °C)] 98.8 °F (37.1 °C)  Pulse:  [] 100  Resp:  [18] 18  SpO2:  [97 %-98 %] 98 %  BP: (108-116)/(57-63) 108/57     Weight: 81.6 kg (180 lb)  Body mass index is 32.92 kg/m².      Intake/Output Summary (Last 24 hours) at 17 07  Last data filed at 17 1700   Gross per 24 hour   Intake              700 ml   Output              600 ml   Net              100 ml       Significant Labs:  Lab Results   Component Value Date    GROUPTRH O POS 07/10/2017    HEPBSAG Negative 2016    STREPBCULT No Group B Streptococcus isolated 2017       Recent Labs  Lab 17   HGB 9.1*   HCT 27.4*       CBC:     Recent Labs  Lab 17   WBC 18.85*   RBC 3.04*   HGB 9.1*   HCT 27.4*      MCV 90   MCH 29.9   MCHC 33.2     I have personallly reviewed all pertinent lab results from the last 24 hours.    Physical Exam:   Constitutional: She is oriented to person, place, and time. She appears well-developed and well-nourished. No distress.    HENT:   Head:  Normocephalic and atraumatic.    Eyes: EOM are normal. No scleral icterus.    Neck: Normal range of motion.    Cardiovascular: Normal rate, regular rhythm, normal heart sounds and intact distal pulses.  Exam reveals no cyanosis.     Pulmonary/Chest: Effort normal and breath sounds normal.        Abdominal: Soft. Bowel sounds are normal. There is tenderness (mild and appropriate). There is no rebound and no guarding.   Uterine fundus firm and below umbilicus.                   Neurological: She is alert and oriented to person, place, and time.    Skin: Skin is warm and dry. She is not diaphoretic. No cyanosis.    Psychiatric: She has a normal mood and affect. Her behavior is normal. Judgment and thought content normal.

## 2017-07-13 NOTE — PLAN OF CARE
Problem: Patient Care Overview  Goal: Plan of Care Review  Outcome: Outcome(s) achieved Date Met: 07/13/17  VSS. Fundus firm. Bleeding light. Breastfeeding well. Ambulating and voiding without difficulty. Passing flatus. BM noted today. Pt denies pain other than intermittent cramping. Mother baby care guide reviewed on previous shift. Pt reeducated on warning signs. AVS reviewed. Pt aware to follow up with Dr. Dexter in 6 weeks. Pt verbalized understanding. Denies questions or concerns. Pt to be wheeled off the unit with infant in lap.

## 2017-08-25 ENCOUNTER — POSTPARTUM VISIT (OUTPATIENT)
Dept: OBSTETRICS AND GYNECOLOGY | Facility: CLINIC | Age: 27
End: 2017-08-25
Attending: OBSTETRICS & GYNECOLOGY
Payer: MEDICAID

## 2017-08-25 VITALS
SYSTOLIC BLOOD PRESSURE: 112 MMHG | WEIGHT: 153.25 LBS | HEIGHT: 62 IN | DIASTOLIC BLOOD PRESSURE: 76 MMHG | BODY MASS INDEX: 28.2 KG/M2

## 2017-08-25 DIAGNOSIS — O34.219 VAGINAL BIRTH AFTER CESAREAN (VBAC): ICD-10-CM

## 2017-08-25 DIAGNOSIS — O34.219 VBAC, DELIVERED: ICD-10-CM

## 2017-08-25 PROBLEM — Z34.90 SUPERVISION OF NORMAL PREGNANCY: Status: RESOLVED | Noted: 2017-01-09 | Resolved: 2017-08-25

## 2017-08-25 PROCEDURE — 99999 PR PBB SHADOW E&M-EST. PATIENT-LVL II: CPT | Mod: PBBFAC,,, | Performed by: OBSTETRICS & GYNECOLOGY

## 2017-08-25 PROCEDURE — 99212 OFFICE O/P EST SF 10 MIN: CPT | Mod: PBBFAC | Performed by: OBSTETRICS & GYNECOLOGY

## 2017-08-25 RX ORDER — INTERFERON BETA-1A 44 UG/.5ML
INJECTION, SOLUTION SUBCUTANEOUS
COMMUNITY
Start: 2017-07-14 | End: 2019-06-10 | Stop reason: SDUPTHER

## 2017-08-25 NOTE — PROGRESS NOTES
"CC: Post-partum follow-up    Alejandra Garcia is a 27 y.o. female  who presents for post-partum visit.  She is S/P a .  She and the baby are doing well.  No pain.  No fever.   No bowel / bladder complaints.    Delivery Date: 2017    Delivery MD: Katarzyna Dexter    Gender: female  Name: Priscilla  Birth Weight: 7 pounds 8 ounces  Breast Feeding: YES  Depression: NO  Contraception: condoms    Pregnancy was complicated by:  N/A    /76   Ht 5' 2" (1.575 m)   Wt 69.5 kg (153 lb 3.5 oz)   LMP 10/09/2016   Breastfeeding? Yes   BMI 28.02 kg/m²     ROS:  GENERAL: No fever, chills, fatigability.  VULVAR: No pain, no lesions and no itching.  VAGINAL: No relaxation, no itching, no discharge, no abnormal bleeding and no lesions.  ABDOMEN: No abdominal pain. Denies nausea. Denies vomiting. No diarrhea. No constipation  BREAST: Denies pain. No lumps. No discharge.  URINARY: No incontinence, no nocturia, no frequency and no dysuria.  CARDIOVASCULAR: No chest pain. No shortness of breath. No leg cramps.  NEUROLOGICAL: No headaches. No vision changes.    PHYSICAL EXAM:  ABDOMEN:  Soft, non-tender, non-distended  VULVA:  Normal, no lesions  CERVIX:  Without lesions, polyps or tenderness. MENSTRUATING.  UTERUS:  Normal size, shape, consistency, no mass or tenderness.  ADNEXA:  Normal in size without mass or tenderness    IMP:  Doing well S/P    Instructions / precautions reviewed  Contraceptive counseling    Rx PNV    PLAN:  May resume normal activities  Return in about 6 weeks for pap  Return in about 1 year (around 2018).      "

## 2017-12-08 ENCOUNTER — OFFICE VISIT (OUTPATIENT)
Dept: OBSTETRICS AND GYNECOLOGY | Facility: CLINIC | Age: 27
End: 2017-12-08
Payer: MEDICAID

## 2017-12-08 VITALS
SYSTOLIC BLOOD PRESSURE: 132 MMHG | HEIGHT: 62 IN | DIASTOLIC BLOOD PRESSURE: 80 MMHG | WEIGHT: 158.31 LBS | BODY MASS INDEX: 29.13 KG/M2

## 2017-12-08 DIAGNOSIS — Z30.09 GENERAL COUNSELING AND ADVICE ON FEMALE CONTRACEPTION: ICD-10-CM

## 2017-12-08 DIAGNOSIS — Z12.4 PAP SMEAR FOR CERVICAL CANCER SCREENING: ICD-10-CM

## 2017-12-08 DIAGNOSIS — Z01.419 ENCOUNTER FOR GYNECOLOGICAL EXAMINATION WITHOUT ABNORMAL FINDING: Primary | ICD-10-CM

## 2017-12-08 PROBLEM — O34.219 VBAC, DELIVERED: Status: RESOLVED | Noted: 2017-07-12 | Resolved: 2017-12-08

## 2017-12-08 PROCEDURE — 99395 PREV VISIT EST AGE 18-39: CPT | Mod: S$PBB,,, | Performed by: OBSTETRICS & GYNECOLOGY

## 2017-12-08 PROCEDURE — 88175 CYTOPATH C/V AUTO FLUID REDO: CPT

## 2017-12-08 PROCEDURE — 99213 OFFICE O/P EST LOW 20 MIN: CPT | Mod: PBBFAC | Performed by: OBSTETRICS & GYNECOLOGY

## 2017-12-08 PROCEDURE — 99999 PR PBB SHADOW E&M-EST. PATIENT-LVL III: CPT | Mod: PBBFAC,,, | Performed by: OBSTETRICS & GYNECOLOGY

## 2017-12-08 RX ORDER — CHOLECALCIFEROL (VITAMIN D3) 25 MCG
1000 TABLET ORAL DAILY
COMMUNITY

## 2017-12-08 NOTE — PROGRESS NOTES
Subjective:       Patient ID: Alejandra Garcia is a 27 y.o. female.    Chief Complaint:  Gynecologic Exam ( 2 cycles in october lasting 6 and 3 days, recently stopped breastfeeding)      History of Present Illness  HPI    Alejandra Garcia is a 27 y.o. female  here for her annual GYN exam.  Had a cycle which began October 3,and lasted 3 days, then another cycle began  and lasted 6 days.  She describes her periods as usually  regular, normal flow, lasting 5-6 days. Breastfeed for 3 months, then weaned  Denies break through bleeding.   Denies vaginal itching or irritation.  Denies vaginal discharge.  She is sexually active. She has had 1 partner for 8 years .  She uses condoms for contraception.   History of abnormal pap: No  Last Pap: approximate date  and was normal  Denies domestic violence. She does feel safe at home.     Past Medical History:   Diagnosis Date    Menarche     Age of onset 12    MS (multiple sclerosis)     Multiple sclerosis      Past Surgical History:   Procedure Laterality Date     SECTION, LOW TRANSVERSE      Breech in labor    CHOLECYSTECTOMY      VAGINAL BIRTH AFTER  SECTION  2017     Social History     Social History    Marital status:      Spouse name: N/A    Number of children: N/A    Years of education: N/A     Occupational History    Not on file.     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 No   BMI 28.95 kg/m²         GYN & OB History  Patient's last menstrual period was 2017.   Date of Last Pap: 2014    OB History    Para Term  AB Living   2 2 2     2   SAB TAB Ectopic Multiple Live Births         0 2      # Outcome Date GA Lbr Ramesh/2nd Weight Sex Delivery Anes PTL Lv   2 Term 17 39w2d 16:15 / 01:03 3.402 kg (7 lb 8 oz) F  EPI N EMMA   1 Term 01/07/15 37w6d  3.459 kg (7 lb 10 oz) F CS-LTranv Spinal N EMMA      Complications: Breech birth          Review of Systems  Review of Systems   Constitutional: Negative for activity change, appetite change, fatigue and unexpected weight change.   HENT: Negative.    Eyes: Negative for visual disturbance.   Respiratory: Negative for shortness of breath and wheezing.    Cardiovascular: Negative for chest pain, palpitations and leg swelling.   Gastrointestinal: Negative for abdominal pain, bloating and blood in stool.   Endocrine: Negative for diabetes and hair loss.   Genitourinary: Negative for decreased libido, dyspareunia, menorrhagia and menstrual problem.   Musculoskeletal: Negative for back pain and joint swelling.   Skin:  Negative for no acne and hair changes.   Neurological: Negative for headaches.   Hematological: Does not bruise/bleed easily.   Psychiatric/Behavioral: Negative for depression and sleep disturbance. The patient is not nervous/anxious.    Breast: Negative for breast pain and nipple discharge          Objective:    Physical Exam:   Constitutional: She is oriented to person, place, and time. She appears well-developed and well-nourished.    HENT:   Head: Normocephalic and atraumatic.    Eyes: " EOM are normal. Pupils are equal, round, and reactive to light.    Neck: Normal range of motion. Neck supple.    Cardiovascular: Normal rate and regular rhythm.     Pulmonary/Chest: Effort normal and breath sounds normal.   BREASTS: Symmetrical, no skin changes or visible lesions.  No palpable masses, nipple discharge bilaterally.          Abdominal: Soft. Bowel sounds are normal.     Genitourinary: Pelvic exam was performed with patient supine.   Genitourinary Comments: PELVIC: Normal external genitalia without lesions.  Normal hair distribution.  Adequate perineal body, normal urethral meatus.  Vagina moist and well rugated without lesions or discharge.  Cervix pink, VERY ANTERIOR, without lesions, discharge or tenderness.  No significant cystocele or rectocele.  Bimanual exam shows uterus to be normal size, regular, mobile and nontender.  Adnexa without masses or tenderness.               Musculoskeletal: Normal range of motion and moves all extremeties.       Neurological: She is alert and oriented to person, place, and time.    Skin: Skin is warm and dry.    Psychiatric: She has a normal mood and affect.          Assessment:        1. Encounter for gynecological examination without abnormal finding    2. Pap smear for cervical cancer screening    3. General counseling and advice on female contraception               Plan:        1. Encounter for gynecological examination without abnormal finding  COUNSELING:  The patient was counseled today on regular weight bearing exercise. The patient was also counseled today on ACS PAP guidelines, with recommendations for yearly pelvic exams unless their uterus, cervix, and ovaries were removed for benign reasons; in that case, examinations every 3-5 years are recommended. The patient was also counseled regarding monthly breast self-examination, routine STD screening for at-risk populations, prophylactic immunizations for transmitted infections such as HPV, Pertussis, or  Influenza as appropriate, and yearly mammograms when indicated by ACS guidelines. She was advised to see her primary care physician for all other health maintenance.   FOLLOW-UP with me for next routine visit.         2. Pap smear for cervical cancer screening    - Liquid-based pap smear, screening     3. General counseling and advice on female contraception    - Patient was counseled today on contraceptive options--Pills, Depo-Provera, IUD, Nexplanon, & Nuva Ring. We discussed the advantages and disadvantages of each. We discussed the continued use of condoms to prevent STDs. The patient elected to use condoms. The session lasted approximately 15 minutes, greater than 50% was counseling. All her questions were answered.      Return in about 1 year (around 12/8/2018).

## 2018-11-28 NOTE — ASSESSMENT & PLAN NOTE
[facility]  Cardiology  Progress Note    Patient Name: Tara Mondragon  MRN: [id number]  Admission Date: 11/23/2018  Hospital Length of Stay: 2 days  Code Status: Full Code   Attending Physician: William Quiroz MD   Primary Care Physician: Jaun Mata MD  Expected Discharge Date:   Principal Problem:Acute on chronic diastolic heart failure    Subjective:     Hospital Course:   11/23/2018 Presented to the ER with complaints of SOB with progressive worsening.  slightly higher than previous reading at 141. Troponin .010. CXR  suggestive of pulmonary edema/CHF with possible small right pleural effusion worse than previous CXR. BMP and CBC WNL. Given Lasix 80mg IV in the ER with 250cc out so far. Will admit to observation under care of [facility] for ADHF related to dietary indiscretion of salt intake  11/24/2018 Pt had subjective dyspnea with talking. Pt only diuresis about 2 L of fluid, net neg only 1 liter. Renal function is stable.  No ovenight issues.  11/25/2018 Pt had HERNANDES, with hypoxia with ambulating down the fonseca, O2 sat > 86. Pt has wheezes on exam. UOP ~ 2L, net neg 1.5.  Chest pain free  11/26/2018 Remains on IV Lasix BID with 1.7 liters out overnight and negative 3.4 liters since admission. SOB with ambulating to bathroom this AM. Will repeat ambulatory pulse ox. Proceed with PFTs today as well as CXR PA& lat. HR and BP stable   11/27/2018 Remains on IV Lasix BID with dose of IVPB Diuril given yesterday. 2.0 liters out overnight and negative 4.6 liters since admission. HR and BP stable overnight. Patient reports SOB improved. Pulmonary consulted yesterday with recs reviewed. PFTs this AM. CTA chest as well today. Will continue IV Lasix BID for now.   11/28/2018 Remains on IV Lasix BID with 1.6 liters out overnight. Negative 5.9 liters since admission. PFTs with no evidence of obstruction. CTA with no evidence of PE; ground glass opacity to ULL unchanged from previous images  Postpartum care:  - Patient doing well. Continue routine management and advances.  - Continue PO pain meds. Pain well controlled.  - Encourage ambulation.   - Heme: Pre Delivery h/h 11/34 --> Post Delivery h/h 9/27. Pt taking iron.  - Contraception - defer to 6 week f/u  - Lactation - The patient is breast feeding. Lactation nurse following along PRN  - Rh Status - POS  - home today.           favoring ILD. SOB better today per patient but not completely resolved. CBC and BMP stable. Discussed further evaluation for SOB with patient to include LHC and RHC. Will proceed today         Review of Systems   Constitution: Negative for chills, decreased appetite, diaphoresis, fever and weakness.   Cardiovascular: Positive for dyspnea on exertion. Negative for chest pain, claudication, cyanosis, irregular heartbeat, leg swelling, near-syncope, orthopnea, palpitations, paroxysmal nocturnal dyspnea and syncope.   Respiratory: Positive for cough. Negative for hemoptysis, shortness of breath and wheezing.    Gastrointestinal: Negative for bloating, abdominal pain, constipation, diarrhea, melena, nausea and vomiting.   Neurological: Negative for dizziness.     Objective:     Vital Signs (Most Recent):  Temp: 96.2 °F (35.7 °C) (11/28/18 0804)  Pulse: 91 (11/28/18 0804)  Resp: 18 (11/28/18 0804)  BP: 112/64 (11/28/18 0804)  SpO2: (!) 93 % (11/28/18 0753) Vital Signs (24h Range):  Temp:  [96.2 °F (35.7 °C)-97.6 °F (36.4 °C)] 96.2 °F (35.7 °C)  Pulse:  [84-95] 91  Resp:  [18-20] 18  SpO2:  [93 %-95 %] 93 %  BP: ()/(53-67) 112/64     Weight: 85.6 kg (188 lb 11.4 oz)  Body mass index is 30.46 kg/m².     SpO2: (!) 93 %  O2 Device (Oxygen Therapy): room air      Intake/Output Summary (Last 24 hours) at 11/28/2018 0858  Last data filed at 11/28/2018 0600  Gross per 24 hour   Intake 305 ml   Output 1500 ml   Net -1195 ml       Lines/Drains/Airways     Peripheral Intravenous Line                 Peripheral IV - Single Lumen 11/27/18 1030 Anterior;Left Forearm less than 1 day         Peripheral IV - Single Lumen 11/27/18 1656 Left Upper Arm less than 1 day                Physical Exam   Constitutional: She is oriented to person, place, and time. She appears well-developed and well-nourished. No distress.   Cardiovascular: Normal rate and regular rhythm. Exam reveals no gallop.   No murmur heard.  Pulmonary/Chest: Effort  normal and breath sounds normal. No respiratory distress. She has no wheezes.   Abdominal: Soft. Bowel sounds are normal. She exhibits no distension. There is no tenderness.   Neurological: She is alert and oriented to person, place, and time.   Skin: Skin is warm and dry.       Significant Labs:     Recent Labs   Lab 11/28/18  0529   *   K 4.3   CL 96   CO2 29   BUN 38*   CREATININE 1.2   MG 2.2         Significant Imaging: Echocardiogram:   Transthoracic echo (TTE) complete (Cupid Only):   Results for orders placed or performed during the hospital encounter of 11/17/18   Transthoracic echo (TTE) complete (Cupid Only)   Result Value Ref Range    BSA 1.95 m2    LA WIDTH 3.80 cm    LVIDD 4.45 3.5 - 6.0 cm    IVS 1.19 (A) 0.6 - 1.1 cm    PW 1.25 (A) 0.6 - 1.1 cm    Ao root annulus 2.67 cm    LVIDS 2.38 2.1 - 4.0 cm    FS 47 28 - 44 %    LA volume 70.05 cm3    LV mass 199.42 g    LA size 4.49 cm    RVDD 2.59 cm    Left Ventricle Relative Wall Thickness 0.56 cm    AV mean gradient 27 mmHg    AV valve area 1.13 cm2    E/A ratio 1.28     E wave decelartion time 220.46 msec    LVOT diameter 1.90 cm    LVOT area 2.83 cm2    LVOT peak VTI 29.83 cm    Ao peak tristan 2.83 m/s    Ao VTI 75.00 cm    LVOT stroke volume 84.53 cm3    AV peak gradient 32.04 mmHg    MV Peak E Tristan 2.16 m/s    TR Max Tristan 3.72 m/s    MV Peak A Tristan 1.69 m/s    LV Systolic Volume 19.69 mL    LV Systolic Volume Index 10.1 mL/m2    LV Diastolic Volume 90.13 mL    LV Diastolic Volume Index 46.22 mL/m2    LA Volume Index 35.9 mL/m2    LV Mass Index 102.3 g/m2    RA Major Axis 3.93 cm    Left Atrium Minor Axis 4.83 cm    Left Atrium Major Axis 4.83 cm    Triscuspid Valve Regurgitation Peak Gradient 55.35 mmHg    Right Atrial Pressure (from IVC) 3 mmHg    MV mean gradient 8 mmHg    MV valve area p 1/2 method 2.97 cm2    TV rest pulmonary artery pressure 58.35 mmHg    MV stenosis pressure 1/2 time 74 ms     Assessment and Plan:     Brief HPI: Seen on AM NP  rounds with  at the bedside. Reports SOB slightly better in comparison to yesterday but not completely resolved. Discussed cardiac POC with patient and  as detailed below-both verbalized understanding and agree with POC     * Acute on chronic diastolic heart failure    -recent TTE in 11/2018 with normal LVEF and indeterminate diastolic dysfunction; previous echo in 2013 with diastolic dysfunction  -felt to be related to dietary indiscretion of salt intake  -volume overloaded on exam and pulmonary edema on CXR upon admission; repeat CXR with no significant improvement in pulmonary edema; continued on IV Lasix BID with 1.6 liters out overnight and negative 5.9 liters since admission;   -Pulmonary on board with PFTs with no evidence of obstruction; CTA yesterday with no PE and ground glass opacities favoring ILD   -will continue IV Lasix BID for now; will proceed with LHC and RHC today to assess graft patency as well as filling pressures   -continue Norvasc, Metoprolol and Aldactone     Shortness of breath    -orginally felt to be related to ADHF  -aggressive diuresis in process with decent response  -SOB slightly improved over past 24 hours  -Pulmonary on board with no evidence of obstruction on PFTs; CTA reviewed  -will likely proceed with LHC and RHC today        Hypertension    -SBP 90s-110s overnight  -stable upon presentation   -continue CCB, BB and Aldactone      S/P MVR (mitral valve replacement)    -porcine MVR 2/2013  -recent TTE with well seated valve; LINDA earlier this week for further evaluation of mitral valve with moderate MR          VTE Risk Mitigation (From admission, onward)        Ordered     enoxaparin injection 40 mg  Daily      11/23/18 1210     IP VTE HIGH RISK PATIENT  Once      11/23/18 1210          RHONA De Leon, ANP  Cardiology  [facility]

## 2018-12-11 ENCOUNTER — OFFICE VISIT (OUTPATIENT)
Dept: OBSTETRICS AND GYNECOLOGY | Facility: CLINIC | Age: 28
End: 2018-12-11
Attending: OBSTETRICS & GYNECOLOGY
Payer: MEDICAID

## 2018-12-11 VITALS
BODY MASS INDEX: 26.01 KG/M2 | WEIGHT: 141.31 LBS | DIASTOLIC BLOOD PRESSURE: 80 MMHG | HEIGHT: 62 IN | SYSTOLIC BLOOD PRESSURE: 100 MMHG

## 2018-12-11 DIAGNOSIS — Z01.419 ENCOUNTER FOR GYNECOLOGICAL EXAMINATION WITHOUT ABNORMAL FINDING: Primary | ICD-10-CM

## 2018-12-11 PROCEDURE — 99395 PREV VISIT EST AGE 18-39: CPT | Mod: S$PBB,,, | Performed by: OBSTETRICS & GYNECOLOGY

## 2018-12-11 PROCEDURE — 99999 PR PBB SHADOW E&M-EST. PATIENT-LVL III: CPT | Mod: PBBFAC,,, | Performed by: OBSTETRICS & GYNECOLOGY

## 2018-12-11 PROCEDURE — 99213 OFFICE O/P EST LOW 20 MIN: CPT | Mod: PBBFAC | Performed by: OBSTETRICS & GYNECOLOGY

## 2018-12-11 RX ORDER — FERROUS SULFATE, DRIED 160(50) MG
1 TABLET, EXTENDED RELEASE ORAL
COMMUNITY
End: 2019-06-10

## 2018-12-11 NOTE — PROGRESS NOTES
Subjective:       Patient ID: Alejandra Garcia is a 28 y.o. female.    Chief Complaint:  Well Woman      History of Present Illness  HPI    Alejandra Garcia is a 28 y.o. female  here for her annual GYN exam.    She describes her periods as regular, normal flow, lasting 6 days.   denies break through bleeding.   denies vaginal itching or irritation.  Denies vaginal discharge.  She is sexually active. She has had 1 partner for 9 years .  She uses condoms for contraception.   History of abnormal pap: No  Last Pap: approximate date  and was normal  denies domestic violence. She does feel safe at home.     Past Medical History:   Diagnosis Date    Menarche     Age of onset 12    MS (multiple sclerosis)     Multiple sclerosis      Past Surgical History:   Procedure Laterality Date     SECTION, LOW TRANSVERSE      Breech in labor    CHOLECYSTECTOMY      CHOLECYSTECTOMY-LAPAROSCOPIC N/A 3/9/2015    [facility]    DELIVERY-CEASAREAN SECTION N/A 2015    [facility]D    VAGINAL BIRTH AFTER  SECTION  2017     Social History     Socioeconomic History    Marital status:      Spouse name: Not on file    Number of children: Not on file    Years of education: Not on file    Highest education level: Not on file   Social Needs    Financial resource strain: Not on file    Food insecurity - worry: Not on file    Food insecurity - inability: Not on file    Transportation needs - medical: Not on file    Transportation needs - non-medical: Not on file   Occupational History    Not on file   Tobacco Use    Smoking status: Never Smoker    Smokeless tobacco: Never Used   Substance and Sexual Activity    Alcohol use: No    Drug use: No    Sexual activity: Yes     Partners: Male     Birth control/protection: None     Comment:  since    Other Topics Concern    Not on file   Social  "History Narrative    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   Date of Last Pap: 2017    OB History    Para Term  AB Living   2 2 2     2   SAB TAB Ectopic Multiple Live Births         0 2      # Outcome Date GA Lbr Ramesh/2nd Weight Sex Delivery Anes PTL Lv   2 Term 17 39w2d 16:15 / 01:03 3.402 kg (7 lb 8 oz) F  EPI N EMMA   1 Term 01/07/15 37w6d  3.459 kg (7 lb 10 oz) F CS-LTranv Spinal N EMMA      Complications: Breech birth          Review of Systems  Review of Systems   Constitutional: Negative for activity change, appetite change, fatigue and unexpected weight change.   HENT: Negative.    Eyes: Negative for visual disturbance.   Respiratory: Negative for shortness of breath and wheezing.    Cardiovascular: Negative for chest pain, palpitations and leg swelling.   Gastrointestinal: Negative for abdominal pain, bloating and blood in stool.   Endocrine: Negative for diabetes and hair loss.   Genitourinary: Negative for decreased libido, dyspareunia, menorrhagia and menstrual problem.   Musculoskeletal: Negative for back pain and joint swelling.   Neurological: Negative for headaches.   Hematological: Does not bruise/bleed easily.   Psychiatric/Behavioral: Negative for depression and sleep disturbance. The patient is not nervous/anxious.    Breast: Negative for mastodynia and " nipple discharge          Objective:      Physical Exam:   Constitutional: She is oriented to person, place, and time. She appears well-developed and well-nourished.    HENT:   Head: Normocephalic and atraumatic.    Eyes: EOM are normal. Pupils are equal, round, and reactive to light.    Neck: Normal range of motion. Neck supple.    Cardiovascular: Normal rate and regular rhythm.     Pulmonary/Chest: Effort normal and breath sounds normal.   BREASTS:  no mass, no tenderness, no deformity and no retraction. Right breast exhibits no inverted nipple, no mass, no nipple discharge, no skin change, no tenderness, no bleeding and no swelling. Left breast exhibits no inverted nipple, no mass, no nipple discharge, no skin change, no tenderness, no bleeding and no swelling. Breasts are symmetrical.              Abdominal: Soft. Bowel sounds are normal.     Genitourinary: Pelvic exam was performed with patient supine.   Genitourinary Comments: PELVIC: Normal external genitalia without lesions.  Normal hair distribution.  Adequate perineal body, normal urethral meatus.  Vagina moist and well rugated without lesions or discharge.  Cervix pink, without lesions, discharge or tenderness.  No significant cystocele or rectocele.  Bimanual exam shows uterus to be normal size, regular, anteverted, mobile and nontender.  Adnexa without masses or tenderness.               Musculoskeletal: Normal range of motion and moves all extremeties.       Neurological: She is alert and oriented to person, place, and time.    Skin: Skin is warm and dry.    Psychiatric: She has a normal mood and affect.              Assessment:        1. Encounter for gynecological examination without abnormal finding               Plan:        1. Encounter for gynecological examination without abnormal finding  COUNSELING:  The patient was counseled today on osteoporosis prevention, calcium supplementation, and regular weight bearing exercise. The patient was also  counseled today on ACS PAP guidelines, with recommendations for yearly pelvic exams unless their uterus, cervix, and ovaries were removed for benign reasons; in that case, examinations every 3-5 years are recommended. The patient was also counseled regarding monthly breast self-examination, routine STD screening for at-risk populations, prophylactic immunizations for transmitted infections such as HPV, Pertussis, or Influenza as appropriate, and yearly mammograms when indicated by ACS guidelines. She was advised to see her primary care physician for all other health maintenance.   FOLLOW-UP with me for next routine visit.            Follow-up in about 1 year (around 12/11/2019).

## 2019-04-08 ENCOUNTER — TELEPHONE (OUTPATIENT)
Dept: FAMILY MEDICINE | Facility: CLINIC | Age: 29
End: 2019-04-08

## 2019-04-08 NOTE — TELEPHONE ENCOUNTER
----- Message from Ellen Garner sent at 4/8/2019 10:56 AM CDT -----  Contact: Pt   Name of Who is Calling: BOB KLINE [[id number]]    What is the request in detail: Pt called to schedule her annual on 4/16 if possible. Please advise.     Can the clinic reply by [facility]: No     What Number to [facility] Back [facility]: [phone]

## 2019-04-08 NOTE — TELEPHONE ENCOUNTER
"Call placed to Pt, to schedule appointment. Pt states that she wants a late appointment on 4/16/19. No availability for date & time slot she has chose. Pt asked if she would do a earlier appointment time. Pt states,"no, I'll call back at a later time".   "

## 2019-05-27 ENCOUNTER — PATIENT OUTREACH (OUTPATIENT)
Dept: ADMINISTRATIVE | Facility: HOSPITAL | Age: 29
End: 2019-05-27

## 2019-05-27 DIAGNOSIS — Z13.220 SCREENING FOR HYPERLIPIDEMIA: Primary | ICD-10-CM

## 2019-06-10 ENCOUNTER — OFFICE VISIT (OUTPATIENT)
Dept: FAMILY MEDICINE | Facility: CLINIC | Age: 29
End: 2019-06-10
Payer: MEDICAID

## 2019-06-10 ENCOUNTER — LAB VISIT (OUTPATIENT)
Dept: LAB | Facility: HOSPITAL | Age: 29
End: 2019-06-10
Attending: FAMILY MEDICINE
Payer: MEDICAID

## 2019-06-10 ENCOUNTER — PATIENT MESSAGE (OUTPATIENT)
Dept: FAMILY MEDICINE | Facility: CLINIC | Age: 29
End: 2019-06-10

## 2019-06-10 VITALS
HEIGHT: 62 IN | TEMPERATURE: 99 F | BODY MASS INDEX: 25.8 KG/M2 | HEART RATE: 98 BPM | DIASTOLIC BLOOD PRESSURE: 60 MMHG | SYSTOLIC BLOOD PRESSURE: 104 MMHG | OXYGEN SATURATION: 98 % | RESPIRATION RATE: 16 BRPM | WEIGHT: 140.19 LBS

## 2019-06-10 DIAGNOSIS — Z13.220 SCREENING FOR HYPERLIPIDEMIA: ICD-10-CM

## 2019-06-10 DIAGNOSIS — R74.01 ELEVATED TRANSAMINASE LEVEL: ICD-10-CM

## 2019-06-10 DIAGNOSIS — R74.01 ELEVATED TRANSAMINASE LEVEL: Primary | ICD-10-CM

## 2019-06-10 LAB
ALBUMIN SERPL BCP-MCNC: 4.1 G/DL (ref 3.5–5.2)
ALP SERPL-CCNC: 59 U/L (ref 55–135)
ALT SERPL W/O P-5'-P-CCNC: 50 U/L (ref 10–44)
ANION GAP SERPL CALC-SCNC: 7 MMOL/L (ref 8–16)
AST SERPL-CCNC: 30 U/L (ref 10–40)
BILIRUB SERPL-MCNC: 0.2 MG/DL (ref 0.1–1)
BUN SERPL-MCNC: 9 MG/DL (ref 6–20)
CALCIUM SERPL-MCNC: 9.4 MG/DL (ref 8.7–10.5)
CHLORIDE SERPL-SCNC: 104 MMOL/L (ref 95–110)
CHOLEST SERPL-MCNC: 125 MG/DL (ref 120–199)
CHOLEST/HDLC SERPL: 2.6 {RATIO} (ref 2–5)
CO2 SERPL-SCNC: 28 MMOL/L (ref 23–29)
CREAT SERPL-MCNC: 0.7 MG/DL (ref 0.5–1.4)
EST. GFR  (AFRICAN AMERICAN): >60 ML/MIN/1.73 M^2
EST. GFR  (NON AFRICAN AMERICAN): >60 ML/MIN/1.73 M^2
GLUCOSE SERPL-MCNC: 95 MG/DL (ref 70–110)
HDLC SERPL-MCNC: 48 MG/DL (ref 40–75)
HDLC SERPL: 38.4 % (ref 20–50)
LDLC SERPL CALC-MCNC: 52.6 MG/DL (ref 63–159)
NONHDLC SERPL-MCNC: 77 MG/DL
POTASSIUM SERPL-SCNC: 4.1 MMOL/L (ref 3.5–5.1)
PROT SERPL-MCNC: 7.7 G/DL (ref 6–8.4)
SODIUM SERPL-SCNC: 139 MMOL/L (ref 136–145)
TRIGL SERPL-MCNC: 122 MG/DL (ref 30–150)

## 2019-06-10 PROCEDURE — 99999 PR PBB SHADOW E&M-EST. PATIENT-LVL III: ICD-10-PCS | Mod: PBBFAC,,, | Performed by: FAMILY MEDICINE

## 2019-06-10 PROCEDURE — 99213 OFFICE O/P EST LOW 20 MIN: CPT | Mod: S$PBB,,, | Performed by: FAMILY MEDICINE

## 2019-06-10 PROCEDURE — 36415 COLL VENOUS BLD VENIPUNCTURE: CPT | Mod: PO

## 2019-06-10 PROCEDURE — 99999 PR PBB SHADOW E&M-EST. PATIENT-LVL III: CPT | Mod: PBBFAC,,, | Performed by: FAMILY MEDICINE

## 2019-06-10 PROCEDURE — 80061 LIPID PANEL: CPT

## 2019-06-10 PROCEDURE — 99213 PR OFFICE/OUTPT VISIT, EST, LEVL III, 20-29 MIN: ICD-10-PCS | Mod: S$PBB,,, | Performed by: FAMILY MEDICINE

## 2019-06-10 PROCEDURE — 80053 COMPREHEN METABOLIC PANEL: CPT

## 2019-06-10 PROCEDURE — 99213 OFFICE O/P EST LOW 20 MIN: CPT | Mod: PBBFAC,PO | Performed by: FAMILY MEDICINE

## 2019-06-10 NOTE — PROGRESS NOTES
Subjective:       Patient ID: Alejandra Garcia is a 28 y.o. female.    Chief Complaint: abnormal liver enzymes (labs)    28 year old female with MS presents for concerns about her elevated enzymes. She states she had her blood work done and it ws right after her REBIF injections. She doesn't drink alcohol. She states she had a recheck , but doesn't have her test results back yet. She denies recent travel. She denies recent illness.       Past Medical History:  No date: Menarche      Comment:  Age of onset 12  No date: MS (multiple sclerosis)  2013: Multiple sclerosis   Past Surgical History:  :  SECTION, LOW TRANSVERSE      Comment:  Breech in labor  2015: CHOLECYSTECTOMY  3/9/2015: CHOLECYSTECTOMY-LAPAROSCOPIC; N/A      Comment:  Performed by [facility] at [facility] OR  2015: DELIVERY-CEASAREAN SECTION; N/A      Comment:  Performed by [facility] at [facility] L&D  2017: VAGINAL BIRTH AFTER  SECTION  Review of patient's family history indicates:  Problem: Lupus      Relation: Mother          Age of Onset: (Not Specified)  Problem: Hypertension      Relation: Mother          Age of Onset: (Not Specified)  Problem: Diabetes      Relation: Father          Age of Onset: (Not Specified)  Problem: Hypertension      Relation: Maternal Grandmother          Age of Onset: (Not Specified)  Problem: Asthma      Relation: Maternal Grandmother          Age of Onset: (Not Specified)  Problem: Breast cancer      Relation: Neg Hx          Age of Onset: (Not Specified)  Problem: Colon cancer      Relation: Neg Hx          Age of Onset: (Not Specified)  Problem: Ovarian cancer      Relation: Neg Hx          Age of Onset: (Not Specified)    Social History    Socioeconomic History      Marital status:       Spouse name: Not on file      Number of children: Not on file      Years of education: Not on file      Highest education level: Not on file    Occupational History      Not on  "file    Social Needs      Financial resource strain: Not on file      Food insecurity:        Worry: Not on file        Inability: Not on file      Transportation needs:        Medical: Not on file        Non-medical: Not on file    Tobacco Use      Smoking status: Never Smoker      Smokeless tobacco: Never Used    Substance and Sexual Activity      Alcohol use: No      Drug use: No      Sexual activity: Yes        Partners: Male        Birth control/protection: None        Comment:  since 2009    Lifestyle      Physical activity:        Days per week: Not on file        Minutes per session: Not on file      Stress: Not on file    Relationships      Social connections:        Talks on phone: Not on file        Gets together: Not on file        Attends Orthodox service: Not on file        Active member of club or organization: Not on file        Attends meetings of clubs or organizations: Not on file        Relationship status: Not on file    Other Topics      Concerns:        Not on file    Social History Narrative      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 Negative for chills and fever.   Gastrointestinal: Negative for abdominal pain, nausea and vomiting.        She denies changes in the color of her stool.        Objective:       Vitals:    06/10/19 0916   BP: 104/60   Pulse: 98   Resp: 16   Temp: 98.6 °F (37 °C)   TempSrc: Oral   SpO2: 98%   Weight: 63.6 kg (140 lb 3.4 oz)   Height: 5' 2" (1.575 m)       Physical Exam   Constitutional: She is oriented to person, place, and time. She appears well-developed and well-nourished. No distress.   HENT:   Head: Normocephalic.   Right Ear: External ear normal.   Left Ear: External ear normal.   Mouth/Throat: Oropharynx is clear and moist. No oropharyngeal exudate.   Eyes: Conjunctivae are normal.   Neck: Normal range of motion. Neck supple. No thyromegaly present. "   Cardiovascular: Normal rate, regular rhythm and normal heart sounds. Exam reveals no gallop and no friction rub.   No murmur heard.  Pulmonary/Chest: Effort normal and breath sounds normal. No stridor. No respiratory distress. She has no wheezes. She has no rales.   Abdominal: Soft. Bowel sounds are normal. She exhibits no distension and no mass. There is no tenderness. There is no rebound and no guarding.   Musculoskeletal: She exhibits no edema.   Lymphadenopathy:     She has no cervical adenopathy.   Neurological: She is alert and oriented to person, place, and time.   Skin: No rash noted. She is not diaphoretic.   Psychiatric: She has a normal mood and affect.       Assessment:       1. Elevated transaminase level        Plan:       Alejandra was seen today for abnormal liver enzymes.    Diagnoses and all orders for this visit:    Elevated transaminase level  -     Comprehensive metabolic panel; Future

## 2019-06-11 ENCOUNTER — TELEPHONE (OUTPATIENT)
Dept: FAMILY MEDICINE | Facility: CLINIC | Age: 29
End: 2019-06-11

## 2019-06-11 DIAGNOSIS — R74.01 ELEVATED TRANSAMINASE LEVEL: Primary | ICD-10-CM

## 2019-06-11 NOTE — TELEPHONE ENCOUNTER
----- Message from Lina Pearl sent at 6/11/2019  9:09 AM CDT -----  Contact: Self   Type: Patient Call Back    Who called: Self    What is the request in detail: Patient inder advised to come in in two months for repeat blood works.Asking for orders to be placed for the [facility] location.     Can the clinic reply by [facility]?  Call   Would the patient rather a call back or a response via [facility]?  Call   Best call back number: [phone]

## 2019-06-18 ENCOUNTER — PATIENT MESSAGE (OUTPATIENT)
Dept: FAMILY MEDICINE | Facility: CLINIC | Age: 29
End: 2019-06-18

## 2019-10-17 ENCOUNTER — PATIENT MESSAGE (OUTPATIENT)
Dept: FAMILY MEDICINE | Facility: CLINIC | Age: 29
End: 2019-10-17

## 2019-10-18 NOTE — TELEPHONE ENCOUNTER
Last Office Visit Info:   The patient's last visit with Alta Bowie MD was on 6/10/2019.    The patient's last visit in current department was on 6/10/2019.        Last CBC Results:   Lab Results   Component Value Date    WBC 18.85 (H) 07/12/2017    HGB 9.1 (L) 07/12/2017    HCT 27.4 (L) 07/12/2017     07/12/2017       Last CMP Results  Lab Results   Component Value Date     06/10/2019    K 4.1 06/10/2019     06/10/2019    CO2 28 06/10/2019    BUN 9 06/10/2019    CREATININE 0.7 06/10/2019    CALCIUM 9.4 06/10/2019    ALBUMIN 4.1 06/10/2019    AST 30 06/10/2019    ALT 50 (H) 06/10/2019       Last Lipids  Lab Results   Component Value Date    CHOL 125 06/10/2019    TRIG 122 06/10/2019    HDL 48 06/10/2019    LDLCALC 52.6 (L) 06/10/2019       Last A1C  No results found for: HGBA1C    Last TSH  Lab Results   Component Value Date    TSH 1.395 07/04/2013         Current Med Refills  Medication List with Changes/Refills   Current Medications    INTERFERON BETA-1A, ALBUMIN, (REBIF) 44 MCG/0.5 ML INJECTION    Prescription refills called in at [phone]  Administer 0.5 ml subcutaneous 3 times per week.  Dispense 1 box/12 injections; refill x 3       Start Date: 2/24/2014 End Date: --    VITAMIN D 1000 UNITS TAB    Take 1,000 Units by mouth once daily.       Start Date: --        End Date: --

## 2019-12-24 ENCOUNTER — OFFICE VISIT (OUTPATIENT)
Dept: OBSTETRICS AND GYNECOLOGY | Facility: CLINIC | Age: 29
End: 2019-12-24
Attending: OBSTETRICS & GYNECOLOGY
Payer: COMMERCIAL

## 2019-12-24 VITALS
WEIGHT: 152.13 LBS | SYSTOLIC BLOOD PRESSURE: 108 MMHG | BODY MASS INDEX: 27.99 KG/M2 | DIASTOLIC BLOOD PRESSURE: 60 MMHG | HEIGHT: 62 IN

## 2019-12-24 DIAGNOSIS — Z01.419 ENCOUNTER FOR GYNECOLOGICAL EXAMINATION WITHOUT ABNORMAL FINDING: Primary | ICD-10-CM

## 2019-12-24 DIAGNOSIS — Z30.09 GENERAL COUNSELING AND ADVICE ON FEMALE CONTRACEPTION: ICD-10-CM

## 2019-12-24 PROCEDURE — 3078F DIAST BP <80 MM HG: CPT | Mod: CPTII,S$GLB,, | Performed by: OBSTETRICS & GYNECOLOGY

## 2019-12-24 PROCEDURE — 3074F SYST BP LT 130 MM HG: CPT | Mod: CPTII,S$GLB,, | Performed by: OBSTETRICS & GYNECOLOGY

## 2019-12-24 PROCEDURE — 99395 PR PREVENTIVE VISIT,EST,18-39: ICD-10-PCS | Mod: S$GLB,,, | Performed by: OBSTETRICS & GYNECOLOGY

## 2019-12-24 PROCEDURE — 99395 PREV VISIT EST AGE 18-39: CPT | Mod: S$GLB,,, | Performed by: OBSTETRICS & GYNECOLOGY

## 2019-12-24 PROCEDURE — 3074F PR MOST RECENT SYSTOLIC BLOOD PRESSURE < 130 MM HG: ICD-10-PCS | Mod: CPTII,S$GLB,, | Performed by: OBSTETRICS & GYNECOLOGY

## 2019-12-24 PROCEDURE — 99999 PR PBB SHADOW E&M-EST. PATIENT-LVL III: ICD-10-PCS | Mod: PBBFAC,,, | Performed by: OBSTETRICS & GYNECOLOGY

## 2019-12-24 PROCEDURE — 99999 PR PBB SHADOW E&M-EST. PATIENT-LVL III: CPT | Mod: PBBFAC,,, | Performed by: OBSTETRICS & GYNECOLOGY

## 2019-12-24 PROCEDURE — 3078F PR MOST RECENT DIASTOLIC BLOOD PRESSURE < 80 MM HG: ICD-10-PCS | Mod: CPTII,S$GLB,, | Performed by: OBSTETRICS & GYNECOLOGY

## 2019-12-24 NOTE — PROGRESS NOTES
Subjective:       Patient ID: Alejandra Garcia is a 29 y.o. female.    Chief Complaint:  Well Woman      History of Present Illness  HPI    Alejandra Garcia is a 29 y.o. female  here for her annual GYN exam.    She describes her periods as regular, normal flow, lasting 6 days.   denies break through bleeding.   denies vaginal itching or irritation.  Denies vaginal discharge.  She is sexually active. She has had 1 partner for the past 7 years .  She uses condoms for contraception.   History of abnormal pap: No  Last Pap: approximate date 2017 and was normal  denies domestic violence. She does feel safe at home.     Past Medical History:   Diagnosis Date    Menarche     Age of onset 12    MS (multiple sclerosis)     Multiple sclerosis      Past Surgical History:   Procedure Laterality Date     SECTION, LOW TRANSVERSE      Breech in labor    CHOLECYSTECTOMY      VAGINAL BIRTH AFTER  SECTION  2017     Social History     Socioeconomic History    Marital status:      Spouse name: Not on file    Number of children: Not on file    Years of education: Not on file    Highest education level: Not on file   Occupational History    Not on file   Social Needs    Financial resource strain: Not on file    Food insecurity:     Worry: Not on file     Inability: Not on file    Transportation needs:     Medical: Not on file     Non-medical: Not on file   Tobacco Use    Smoking status: Never Smoker    Smokeless tobacco: Never Used   Substance and Sexual Activity    Alcohol use: No    Drug use: No    Sexual activity: Yes     Partners: Male     Birth control/protection: None     Comment:  since    Lifestyle    Physical activity:     Days per week: Not on file     Minutes per session: Not on file    Stress: Not on file   Relationships    Social connections:     Talks on phone: Not on file     Gets together: Not on file     Attends Denominational  "service: Not on file     Active member of club or organization: Not on file     Attends meetings of clubs or organizations: Not on file     Relationship status: Not on file   Other Topics Concern    Not on file   Social History Narrative    Not on file     Family History   Problem Relation Age of Onset    Lupus Mother     Hypertension Mother     Diabetes Father     Hypertension Maternal Grandmother     Asthma Maternal Grandmother     Breast cancer Neg Hx     Colon cancer Neg Hx     Ovarian cancer Neg Hx      OB History        2    Para   2    Term   2            AB        Living   2       SAB        TAB        Ectopic        Multiple   0    Live Births   2                 /60   Ht 5' 2" (1.575 m)   Wt 69 kg (152 lb 1.9 oz)   LMP 2019   BMI 27.82 kg/m²         GYN & OB History  Patient's last menstrual period was 2019.   Date of Last Pap: 2017    OB History    Para Term  AB Living   2 2 2     2   SAB TAB Ectopic Multiple Live Births         0 2      # Outcome Date GA Lbr Ramesh/2nd Weight Sex Delivery Anes PTL Lv   2 Term 17 39w2d 16:15 / 01:03 3.402 kg (7 lb 8 oz) F  EPI N EMMA   1 Term 01/07/15 37w6d  3.459 kg (7 lb 10 oz) F CS-LTranv Spinal N EMMA      Complications: Breech birth       Review of Systems  Review of Systems   Constitutional: Negative for activity change, appetite change, fatigue and unexpected weight change.   HENT: Negative.    Eyes: Negative for visual disturbance.   Respiratory: Negative for shortness of breath and wheezing.    Cardiovascular: Negative for chest pain, palpitations and leg swelling.   Gastrointestinal: Negative for abdominal pain, bloating and blood in stool.   Endocrine: Negative for diabetes and hair loss.   Genitourinary: Negative for decreased libido, dyspareunia, menorrhagia and menstrual problem.   Musculoskeletal: Negative for back pain and joint swelling.   Integumentary:  Negative for acne, hair " changes and nipple discharge.   Neurological: Negative for headaches.   Hematological: Does not bruise/bleed easily.   Psychiatric/Behavioral: Negative for depression and sleep disturbance. The patient is not nervous/anxious.    Breast: Negative for mastodynia and nipple discharge          Objective:      Physical Exam:   Constitutional: She is oriented to person, place, and time. She appears well-developed and well-nourished.    HENT:   Head: Normocephalic and atraumatic.    Eyes: Pupils are equal, round, and reactive to light. EOM are normal.    Neck: Normal range of motion. Neck supple.    Cardiovascular: Normal rate and regular rhythm.     Pulmonary/Chest: Effort normal and breath sounds normal.   BREASTS:  no mass, no tenderness, no deformity and no retraction. Right breast exhibits no inverted nipple, no mass, no nipple discharge, no skin change, no tenderness, no bleeding and no swelling. Left breast exhibits no inverted nipple, no mass, no nipple discharge, no skin change, no tenderness, no bleeding and no swelling. Breasts are symmetrical.              Abdominal: Soft. Bowel sounds are normal.     Genitourinary: Pelvic exam was performed with patient supine.   Genitourinary Comments: PELVIC: Normal external genitalia without lesions.  Normal hair distribution.  Adequate perineal body, normal urethral meatus.  Vagina moist and well rugated without lesions or discharge.  Cervix pink, without lesions, discharge or tenderness.  No significant cystocele or rectocele.  Bimanual exam shows uterus to be normal size, regular, mobile and nontender.  Adnexa without masses or tenderness.               Musculoskeletal: Normal range of motion and moves all extremeties.       Neurological: She is alert and oriented to person, place, and time.    Skin: Skin is warm and dry.    Psychiatric: She has a normal mood and affect.              Assessment:        1. Encounter for gynecological examination without abnormal finding     2. General counseling and advice on female contraception                Plan:        1. Encounter for gynecological examination without abnormal finding  COUNSELING:  The patient was counseled today on regular weight bearing exercise. Patient was counseled today on the new ACS guidelines for cervical cytology screening as well as the current recommendations for breast cancer screening. Counseling session lasted approximately 10 minutes, and all her questions were answered. She was advised to see her primary care physician for all other health maintenance.   FOLLOW-UP with me for next routine visit.         2. General counseling and advice on female contraception         Follow up in about 1 year (around 12/24/2020).

## 2020-05-12 NOTE — PLAN OF CARE
[facility]    Patient ID: Frida is a 65 year old female  : 1954  MRN: [id number]    Chief Compliant  Chief Complaint   Patient presents with   • Md Call   • Pain     pt fell yesterday inside home and hit nose, has black eye and has pain in right hip       HPI:  Patient is a 65 year old female who is here for telephone encounter from home due to the coronavirus epidemic.  Patient states that she tripped and fell on her right hip and the bridge of her nose without any dizziness or loss of consciousness.  Patient states she was able to get up and walk around but is now having extreme pain in the right hip.  Patient has not had problems with that joint in the past and she states that the nosebleed that she incurred has since stopped without any difficulties or congestion.  She does have discoloration below both eyes from her trauma.  She is alert oriented and is able to ambulate without any dizziness or unsteadiness.      Review of Systems   Constitutional: Negative for appetite change, chills, diaphoresis, fatigue and fever.   HENT: Negative.  Negative for congestion, ear pain, hearing loss, rhinorrhea, sneezing, tinnitus, trouble swallowing and voice change.    Eyes: Negative.  Negative for discharge, redness and visual disturbance.   Respiratory: Negative.  Negative for chest tightness, shortness of breath and wheezing.    Cardiovascular: Negative.  Negative for chest pain, palpitations and leg swelling.   Gastrointestinal: Negative.  Negative for abdominal distention, abdominal pain, blood in stool, diarrhea, nausea and vomiting.   Endocrine: Negative.    Genitourinary: Negative.  Negative for dysuria, frequency and urgency.   Musculoskeletal: Positive for arthralgias and gait problem. Negative for back pain, joint swelling and neck pain.   Skin: Negative.  Negative for color change.   Allergic/Immunologic: Negative.    Neurological: Negative for dizziness, tremors, seizures, syncope and headaches.  Problem: Breastfeeding (Adult,Obstetrics,Pediatric)  Goal: Signs and Symptoms of Listed Potential Problems Will be Absent, Minimized or Managed (Breastfeeding)  Signs and symptoms of listed potential problems will be absent, minimized or managed by discharge/transition of care (reference Breastfeeding (Adult,Obstetrics,Pediatric) CPG).   Outcome: Ongoing (interventions implemented as appropriate)   07/12/17 1000   Breastfeeding   Problems Assessed (Breastfeeding) all   Problems Present (Breastfeeding) none   Lactation POC discussed with pt. Infant latching well with deeper latch obtained. POC discussed with frequency, duration, deeper latch and cue based feedings discussed. Acknowledged understanding         Hematological: Negative.    Psychiatric/Behavioral: Negative.  Negative for agitation, confusion, hallucinations, sleep disturbance and suicidal ideas. The patient is not hyperactive.        Patient's medications, allergies, past medical, surgical, social and family histories were reviewed and updated as appropriate.  ALLERGIES:  No Known Allergies   Family History   Adopted: Yes     Meds  Outpatient Current Medications as of as of 5/12/2020       Disp Refills Start End    ondansetron (ZOFRAN) 4 MG tablet (Taking) 15 tablet 0 3/9/2020     Sig: Dispense sublingual tabs. Place 1 tablet under tongue every 8 hours as needed for nausea. 5 days.    Class: Eprescribe    triamcinolone (ARISTOCORT) 0.1 % cream (Taking) 15 g 1 9/23/2019     Sig - Route: Apply topically 2 times daily. - Topical    Class: Eprescribe    insulin aspart 70-30 (NOVOLOG MIX 70/30 FLEXPEN) (70-30) 100 UNIT/ML pen-injector (Taking)   9/16/2018     Class: Historical Med    Route: Subcutaneous    Insulin Pen Needle (BD PEN NEEDLE MIRIAM U/F) 32G X 4 MM Misc (Taking)   6/17/2015     Class: Historical Med    Lancets (MICROLET) Misc (Taking)   2/2/2014     Class: Historical Med    atorvastatin (LIPITOR) 40 MG tablet (Taking) 90 tablet 3 6/3/2019     Sig - Route: Take 1 tablet by mouth at bedtime. - Oral    Class: Eprescribe    metoPROLOL succinate (TOPROL-XL) 50 MG 24 hr tablet (Taking) 90 tablet 3 6/3/2019 6/2/2020    Sig - Route: Take 1 tablet by mouth daily. - Oral    Class: Eprescribe    clopidogrel (PLAVIX) 75 MG tablet (Taking) 90 tablet 3 6/3/2019 6/2/2020    Sig - Route: Take 1 tablet by mouth daily. - Oral    Class: Eprescribe    losartan (COZAAR) 25 MG tablet (Taking) 90 tablet 3 6/3/2019 6/2/2020    Sig - Route: Take 1 tablet by mouth daily. - Oral    Class: Eprescribe        Social History     Socioeconomic History   • Marital status:      Spouse name: Not on file   • Number of children: Not on file   • Years of education: Not on file    • Highest education level: Not on file   Occupational History   • Not on file   Social Needs   • Financial resource strain: Not on file   • Food insecurity:     Worry: Not on file     Inability: Not on file   • Transportation needs:     Medical: Not on file     Non-medical: Not on file   Tobacco Use   • Smoking status: Never Smoker   • Smokeless tobacco: Never Used   Substance and Sexual Activity   • Alcohol use: Not Currently   • Drug use: Not on file   • Sexual activity: Not on file   Lifestyle   • Physical activity:     Days per week: Not on file     Minutes per session: Not on file   • Stress: Not on file   Relationships   • Social connections:     Talks on phone: Not on file     Gets together: Not on file     Attends Buddhist service: Not on file     Active member of club or organization: Not on file     Attends meetings of clubs or organizations: Not on file     Relationship status: Not on file   • Intimate partner violence:     Fear of current or ex partner: Not on file     Emotionally abused: Not on file     Physically abused: Not on file     Forced sexual activity: Not on file   Other Topics Concern   • Not on file   Social History Narrative   • Not on file       Vitals  There were no vitals taken for this visit.    Physical Exam  --no physical exam since this was a telephone encounter    Assessment:  1. Acute pain of right hip    2. Controlled type 2 diabetes mellitus without complication, with long-term current use of insulin (CMS/[facility])    3. Essential hypertension    4. ASHD (arteriosclerotic heart disease)            Plan:  Problem List Items Addressed This Visit        Circulatory    ASHD (arteriosclerotic heart disease) --follow-up with the cardiology service as regularly scheduled.      Other Visit Diagnoses     Acute pain of right hip    -  Primary --x-ray of the hip as ordered    Relevant Orders    XR HIP 2 VIEWS RIGHT W PELVIS 2 VIEWS    Controlled type 2 diabetes mellitus without complication,  with long-term current use of insulin (CMS/[facility])  --a glycohemoglobin A1c is scheduled.    Relevant Orders    GLYCOHEMOGLOBIN    Essential hypertension   --continue current antihypertensive medications as prescribed.            Follow up Return in about 2 days (around 5/14/2020) for Routine follow-up, Check lab results.    Chago Bland MD

## 2020-08-14 DIAGNOSIS — Z11.59 NEED FOR HEPATITIS C SCREENING TEST: ICD-10-CM

## 2020-08-19 ENCOUNTER — TELEPHONE (OUTPATIENT)
Dept: FAMILY MEDICINE | Facility: CLINIC | Age: 30
End: 2020-08-19

## 2020-08-19 NOTE — TELEPHONE ENCOUNTER
----- Message from Hermila Brand sent at 8/19/2020  4:10 PM CDT -----  Name of Who is Calling: BOB KLINE      What is the request in detail: Patient states she received a message from Dr. Bowie to call to schedule an urinalysis for a possible UTI, but there's no order in her chart. Also, patient did test positive for COVID-19 last Wednesday, should she go in to be tested for a UTI yet?. Please advise.       Can the clinic reply by [facility]: Yes      What Number to Call Back if not [facility]: [phone]

## 2020-08-20 RX ORDER — SULFAMETHOXAZOLE AND TRIMETHOPRIM 800; 160 MG/1; MG/1
1 TABLET ORAL 2 TIMES DAILY
Qty: 10 TABLET | Refills: 0 | Status: SHIPPED | OUTPATIENT
Start: 2020-08-20 | End: 2020-08-25

## 2020-10-05 ENCOUNTER — PATIENT MESSAGE (OUTPATIENT)
Dept: ADMINISTRATIVE | Facility: HOSPITAL | Age: 30
End: 2020-10-05

## 2020-10-19 ENCOUNTER — TELEPHONE (OUTPATIENT)
Dept: FAMILY MEDICINE | Facility: CLINIC | Age: 30
End: 2020-10-19

## 2020-10-19 ENCOUNTER — PATIENT MESSAGE (OUTPATIENT)
Dept: OBSTETRICS AND GYNECOLOGY | Facility: CLINIC | Age: 30
End: 2020-10-19

## 2020-10-19 DIAGNOSIS — Z00.00 ANNUAL PHYSICAL EXAM: Primary | ICD-10-CM

## 2020-10-19 NOTE — TELEPHONE ENCOUNTER
----- Message from Gisela Loyola sent at 10/19/2020 12:23 PM CDT -----  Contact: Patient [phone]  Type: Lab    Caller is requesting to schedule their Lab appointment prior to annual appointment.    Order is not listed in EPIC.  Please enter order and contact patient to schedule.    Name of Caller:Patient    Preferred Date and Time of Labs: 10-27-20    Date of EPP Appointment:10-27-20    Where would they like the lab performed? [facility]    Would the patient rather a call back or a response via [facility]? Call back    Best Call Back Number:[phone]

## 2020-11-03 ENCOUNTER — OFFICE VISIT (OUTPATIENT)
Dept: FAMILY MEDICINE | Facility: CLINIC | Age: 30
End: 2020-11-03
Payer: COMMERCIAL

## 2020-11-03 ENCOUNTER — LAB VISIT (OUTPATIENT)
Dept: LAB | Facility: HOSPITAL | Age: 30
End: 2020-11-03
Attending: FAMILY MEDICINE
Payer: COMMERCIAL

## 2020-11-03 VITALS
SYSTOLIC BLOOD PRESSURE: 120 MMHG | HEIGHT: 62 IN | TEMPERATURE: 98 F | DIASTOLIC BLOOD PRESSURE: 80 MMHG | WEIGHT: 158.75 LBS | OXYGEN SATURATION: 99 % | HEART RATE: 86 BPM | BODY MASS INDEX: 29.21 KG/M2

## 2020-11-03 DIAGNOSIS — Z00.00 ANNUAL PHYSICAL EXAM: Primary | ICD-10-CM

## 2020-11-03 DIAGNOSIS — Z00.00 ANNUAL PHYSICAL EXAM: ICD-10-CM

## 2020-11-03 DIAGNOSIS — B35.1 ONYCHOMYCOSIS: ICD-10-CM

## 2020-11-03 LAB
ALBUMIN SERPL BCP-MCNC: 4 G/DL (ref 3.5–5.2)
ALP SERPL-CCNC: 63 U/L (ref 55–135)
ALT SERPL W/O P-5'-P-CCNC: 37 U/L (ref 10–44)
ANION GAP SERPL CALC-SCNC: 8 MMOL/L (ref 8–16)
AST SERPL-CCNC: 26 U/L (ref 10–40)
BASOPHILS # BLD AUTO: 0.03 K/UL (ref 0–0.2)
BASOPHILS NFR BLD: 0.5 % (ref 0–1.9)
BILIRUB SERPL-MCNC: 0.2 MG/DL (ref 0.1–1)
BUN SERPL-MCNC: 11 MG/DL (ref 6–20)
CALCIUM SERPL-MCNC: 8.9 MG/DL (ref 8.7–10.5)
CHLORIDE SERPL-SCNC: 105 MMOL/L (ref 95–110)
CHOLEST SERPL-MCNC: 124 MG/DL (ref 120–199)
CHOLEST/HDLC SERPL: 3.4 {RATIO} (ref 2–5)
CO2 SERPL-SCNC: 29 MMOL/L (ref 23–29)
CREAT SERPL-MCNC: 0.7 MG/DL (ref 0.5–1.4)
DIFFERENTIAL METHOD: NORMAL
EOSINOPHIL # BLD AUTO: 0.1 K/UL (ref 0–0.5)
EOSINOPHIL NFR BLD: 1.7 % (ref 0–8)
ERYTHROCYTE [DISTWIDTH] IN BLOOD BY AUTOMATED COUNT: 12.6 % (ref 11.5–14.5)
EST. GFR  (AFRICAN AMERICAN): >60 ML/MIN/1.73 M^2
EST. GFR  (NON AFRICAN AMERICAN): >60 ML/MIN/1.73 M^2
GLUCOSE SERPL-MCNC: 92 MG/DL (ref 70–110)
HCT VFR BLD AUTO: 38.2 % (ref 37–48.5)
HDLC SERPL-MCNC: 37 MG/DL (ref 40–75)
HDLC SERPL: 29.8 % (ref 20–50)
HGB BLD-MCNC: 12.6 G/DL (ref 12–16)
IMM GRANULOCYTES # BLD AUTO: 0.02 K/UL (ref 0–0.04)
IMM GRANULOCYTES NFR BLD AUTO: 0.3 % (ref 0–0.5)
LDLC SERPL CALC-MCNC: 60.4 MG/DL (ref 63–159)
LYMPHOCYTES # BLD AUTO: 1.3 K/UL (ref 1–4.8)
LYMPHOCYTES NFR BLD: 23 % (ref 18–48)
MCH RBC QN AUTO: 29.7 PG (ref 27–31)
MCHC RBC AUTO-ENTMCNC: 33 G/DL (ref 32–36)
MCV RBC AUTO: 90 FL (ref 82–98)
MONOCYTES # BLD AUTO: 0.7 K/UL (ref 0.3–1)
MONOCYTES NFR BLD: 12.3 % (ref 4–15)
NEUTROPHILS # BLD AUTO: 3.6 K/UL (ref 1.8–7.7)
NEUTROPHILS NFR BLD: 62.2 % (ref 38–73)
NONHDLC SERPL-MCNC: 87 MG/DL
NRBC BLD-RTO: 0 /100 WBC
PLATELET # BLD AUTO: 334 K/UL (ref 150–350)
PMV BLD AUTO: 9.7 FL (ref 9.2–12.9)
POTASSIUM SERPL-SCNC: 4.4 MMOL/L (ref 3.5–5.1)
PROT SERPL-MCNC: 7.9 G/DL (ref 6–8.4)
RBC # BLD AUTO: 4.24 M/UL (ref 4–5.4)
SODIUM SERPL-SCNC: 142 MMOL/L (ref 136–145)
TRIGL SERPL-MCNC: 133 MG/DL (ref 30–150)
WBC # BLD AUTO: 5.79 K/UL (ref 3.9–12.7)

## 2020-11-03 PROCEDURE — 90471 FLU VACCINE (QUAD) GREATER THAN OR EQUAL TO 3YO PRESERVATIVE FREE IM: ICD-10-PCS | Mod: S$GLB,,, | Performed by: FAMILY MEDICINE

## 2020-11-03 PROCEDURE — 99395 PR PREVENTIVE VISIT,EST,18-39: ICD-10-PCS | Mod: 25,S$GLB,, | Performed by: FAMILY MEDICINE

## 2020-11-03 PROCEDURE — 90471 IMMUNIZATION ADMIN: CPT | Mod: S$GLB,,, | Performed by: FAMILY MEDICINE

## 2020-11-03 PROCEDURE — 90686 FLU VACCINE (QUAD) GREATER THAN OR EQUAL TO 3YO PRESERVATIVE FREE IM: ICD-10-PCS | Mod: S$GLB,,, | Performed by: FAMILY MEDICINE

## 2020-11-03 PROCEDURE — 85025 COMPLETE CBC W/AUTO DIFF WBC: CPT

## 2020-11-03 PROCEDURE — 80053 COMPREHEN METABOLIC PANEL: CPT

## 2020-11-03 PROCEDURE — 80061 LIPID PANEL: CPT

## 2020-11-03 PROCEDURE — 90686 IIV4 VACC NO PRSV 0.5 ML IM: CPT | Mod: S$GLB,,, | Performed by: FAMILY MEDICINE

## 2020-11-03 PROCEDURE — 99999 PR PBB SHADOW E&M-EST. PATIENT-LVL III: ICD-10-PCS | Mod: PBBFAC,,, | Performed by: FAMILY MEDICINE

## 2020-11-03 PROCEDURE — 99999 PR PBB SHADOW E&M-EST. PATIENT-LVL III: CPT | Mod: PBBFAC,,, | Performed by: FAMILY MEDICINE

## 2020-11-03 PROCEDURE — 99395 PREV VISIT EST AGE 18-39: CPT | Mod: 25,S$GLB,, | Performed by: FAMILY MEDICINE

## 2020-11-03 NOTE — PROGRESS NOTES
Subjective:       Patient ID: Alejandra Garcia is a 30 y.o. female.    Chief Complaint: Annual Exam    30 year old female presents for an annual exam.     Her menstrual cycles are irregular. She is due for a papsmear scheduled for Dr. Isacc munoz.     Past Medical History:  No date: Menarche      Comment:  Age of onset 12  No date: MS (multiple sclerosis)  2013: Multiple sclerosis   Past Surgical History:  :  SECTION, LOW TRANSVERSE      Comment:  Breech in labor  2015: CHOLECYSTECTOMY  2017: VAGINAL BIRTH AFTER  SECTION  Review of patient's family history indicates:  Problem: Lupus      Relation: Mother          Age of Onset: (Not Specified)  Problem: Hypertension      Relation: Mother          Age of Onset: (Not Specified)  Problem: Diabetes      Relation: Father          Age of Onset: (Not Specified)  Problem: Hypertension      Relation: Maternal Grandmother          Age of Onset: (Not Specified)  Problem: Asthma      Relation: Maternal Grandmother          Age of Onset: (Not Specified)  Problem: Breast cancer      Relation: Neg Hx          Age of Onset: (Not Specified)  Problem: Colon cancer      Relation: Neg Hx          Age of Onset: (Not Specified)  Problem: Ovarian cancer      Relation: Neg Hx          Age of Onset: (Not Specified)    Social History    Socioeconomic History      Marital status:       Spouse name: Not on file      Number of children: Not on file      Years of education: Not on file      Highest education level: Not on file    Occupational History      Not on file    Social Needs      Financial resource strain: Not on file      Food insecurity        Worry: Not on file        Inability: Not on file      Transportation needs        Medical: Not on file        Non-medical: Not on file    Tobacco Use      Smoking status: Never Smoker      Smokeless tobacco: Never Used    Substance and Sexual Activity      Alcohol use: No      Drug use: No      Sexual  "activity: Yes        Partners: Male        Birth control/protection: None        Comment:  since 2009    Lifestyle      Physical activity        Days per week: Not on file        Minutes per session: Not on file      Stress: Not on file    Relationships      Social connections        Talks on phone: Not on file        Gets together: Not on file        Attends Hoahaoism service: Not on file        Active member of club or organization: Not on file        Attends meetings of clubs or organizations: Not on file        Relationship status: Not on file    Other Topics      Concerns:        Not on file    Social History Narrative      Not on file        Review of Systems   Constitutional: Negative for chills, fatigue and fever.   Respiratory: Negative for cough, chest tightness, shortness of breath and wheezing.    Cardiovascular: Negative for chest pain, palpitations and leg swelling.   Gastrointestinal: Negative for abdominal pain, blood in stool, diarrhea, nausea and vomiting.   Genitourinary: Negative for dysuria, frequency and hematuria.   Integumentary:  Negative for rash.   Neurological: Negative for dizziness, syncope and light-headedness.         Objective:       Vitals:    11/03/20 0910   BP: 120/80   Pulse: 86   Temp: 98.1 °F (36.7 °C)   TempSrc: Oral   SpO2: 99%   Weight: 72 kg (158 lb 11.7 oz)   Height: 5' 2" (1.575 m)       Physical Exam  Constitutional:       General: She is not in acute distress.     Appearance: She is well-developed. She is not diaphoretic.   HENT:      Head: Normocephalic.      Right Ear: External ear normal.      Left Ear: External ear normal.      Mouth/Throat:      Pharynx: No oropharyngeal exudate.   Eyes:      Conjunctiva/sclera: Conjunctivae normal.   Neck:      Musculoskeletal: Normal range of motion and neck supple.      Thyroid: No thyromegaly.   Cardiovascular:      Rate and Rhythm: Normal rate and regular rhythm.      Heart sounds: Normal heart sounds. No murmur. No " friction rub. No gallop.    Pulmonary:      Effort: Pulmonary effort is normal. No respiratory distress.      Breath sounds: Normal breath sounds. No stridor. No wheezing, rhonchi or rales.   Chest:      Chest wall: No tenderness.   Abdominal:      General: Bowel sounds are normal. There is no distension.      Palpations: Abdomen is soft. There is no mass.      Tenderness: There is no abdominal tenderness. There is no guarding or rebound.      Hernia: No hernia is present.   Lymphadenopathy:      Cervical: No cervical adenopathy.   Skin:     Findings: No rash.   Neurological:      Mental Status: She is alert.         Assessment:       1. Annual physical exam    2. Onychomycosis        Plan:       Alejandra was seen today for annual exam.    Diagnoses and all orders for this visit:    Annual physical exam  Due for labs today    Onychomycosis  Checking liver enzymes prior to starting terbinafine. Recheck monthly    Other orders  -     Influenza - Quadrivalent *Preferred* (6 months+) (PF)

## 2020-11-04 ENCOUNTER — PATIENT MESSAGE (OUTPATIENT)
Dept: FAMILY MEDICINE | Facility: CLINIC | Age: 30
End: 2020-11-04

## 2020-11-04 RX ORDER — TERBINAFINE HYDROCHLORIDE 250 MG/1
250 TABLET ORAL DAILY
Qty: 90 TABLET | Refills: 0 | Status: SHIPPED | OUTPATIENT
Start: 2020-11-04 | End: 2020-12-04

## 2020-11-16 ENCOUNTER — OFFICE VISIT (OUTPATIENT)
Dept: OBSTETRICS AND GYNECOLOGY | Facility: CLINIC | Age: 30
End: 2020-11-16
Attending: OBSTETRICS & GYNECOLOGY
Payer: COMMERCIAL

## 2020-11-16 VITALS
SYSTOLIC BLOOD PRESSURE: 122 MMHG | WEIGHT: 159.38 LBS | DIASTOLIC BLOOD PRESSURE: 76 MMHG | HEIGHT: 62 IN | BODY MASS INDEX: 29.33 KG/M2

## 2020-11-16 DIAGNOSIS — Z12.4 PAP SMEAR FOR CERVICAL CANCER SCREENING: ICD-10-CM

## 2020-11-16 DIAGNOSIS — N76.0 VULVOVAGINITIS: ICD-10-CM

## 2020-11-16 DIAGNOSIS — Z01.419 ENCOUNTER FOR GYNECOLOGICAL EXAMINATION WITHOUT ABNORMAL FINDING: Primary | ICD-10-CM

## 2020-11-16 PROCEDURE — 3008F PR BODY MASS INDEX (BMI) DOCUMENTED: ICD-10-PCS | Mod: CPTII,S$GLB,, | Performed by: OBSTETRICS & GYNECOLOGY

## 2020-11-16 PROCEDURE — 3008F BODY MASS INDEX DOCD: CPT | Mod: CPTII,S$GLB,, | Performed by: OBSTETRICS & GYNECOLOGY

## 2020-11-16 PROCEDURE — 1126F AMNT PAIN NOTED NONE PRSNT: CPT | Mod: S$GLB,,, | Performed by: OBSTETRICS & GYNECOLOGY

## 2020-11-16 PROCEDURE — 3074F SYST BP LT 130 MM HG: CPT | Mod: CPTII,S$GLB,, | Performed by: OBSTETRICS & GYNECOLOGY

## 2020-11-16 PROCEDURE — 1126F PR PAIN SEVERITY QUANTIFIED, NO PAIN PRESENT: ICD-10-PCS | Mod: S$GLB,,, | Performed by: OBSTETRICS & GYNECOLOGY

## 2020-11-16 PROCEDURE — 3078F DIAST BP <80 MM HG: CPT | Mod: CPTII,S$GLB,, | Performed by: OBSTETRICS & GYNECOLOGY

## 2020-11-16 PROCEDURE — 88175 CYTOPATH C/V AUTO FLUID REDO: CPT

## 2020-11-16 PROCEDURE — 3074F PR MOST RECENT SYSTOLIC BLOOD PRESSURE < 130 MM HG: ICD-10-PCS | Mod: CPTII,S$GLB,, | Performed by: OBSTETRICS & GYNECOLOGY

## 2020-11-16 PROCEDURE — 3078F PR MOST RECENT DIASTOLIC BLOOD PRESSURE < 80 MM HG: ICD-10-PCS | Mod: CPTII,S$GLB,, | Performed by: OBSTETRICS & GYNECOLOGY

## 2020-11-16 PROCEDURE — 99395 PR PREVENTIVE VISIT,EST,18-39: ICD-10-PCS | Mod: S$GLB,,, | Performed by: OBSTETRICS & GYNECOLOGY

## 2020-11-16 PROCEDURE — 99395 PREV VISIT EST AGE 18-39: CPT | Mod: S$GLB,,, | Performed by: OBSTETRICS & GYNECOLOGY

## 2020-11-16 RX ORDER — INTERFERON BETA-1A 44 UG/.5ML
INJECTION, SOLUTION SUBCUTANEOUS
COMMUNITY
Start: 2020-11-12

## 2020-11-16 RX ORDER — CLOTRIMAZOLE AND BETAMETHASONE DIPROPIONATE 10; .64 MG/G; MG/G
CREAM TOPICAL
Qty: 15 G | Refills: 1 | Status: ON HOLD | OUTPATIENT
Start: 2020-11-16 | End: 2021-09-09 | Stop reason: HOSPADM

## 2020-11-16 NOTE — PROGRESS NOTES
Subjective:       Patient ID: Alejandra Garcia is a 30 y.o. female.    Chief Complaint:  Well Woman      History of Present Illness  HPI    Alejandra Garcia is a 30 y.o. female  here for her annual GYN exam.  She and her spouse may consider planning another pregnancy next year. They currently use condoms for contraception. She wonders if she will be able to  again, and I reassured her that this would be possible to try since she was successful last time.   She describes her periods as regular, normal flow, lasting 5 days.   denies break through bleeding.   denies vaginal itching or irritation.  Denies vaginal discharge.  She is sexually active. She has had 1 partner for the past 11 years .  She uses condoms for contraception.   History of abnormal pap: No  Last Pap: approximate date  and was normal  denies domestic violence. She does feel safe at home.     Past Medical History:   Diagnosis Date    Menarche     Age of onset 12    MS (multiple sclerosis)     Multiple sclerosis      Past Surgical History:   Procedure Laterality Date     SECTION, LOW TRANSVERSE      Breech in labor    CHOLECYSTECTOMY      VAGINAL BIRTH AFTER  SECTION  2017     Social History     Socioeconomic History    Marital status:      Spouse name: Not on file    Number of children: Not on file    Years of education: Not on file    Highest education level: Not on file   Occupational History    Not on file   Social Needs    Financial resource strain: Not on file    Food insecurity     Worry: Not on file     Inability: Not on file    Transportation needs     Medical: Not on file     Non-medical: Not on file   Tobacco Use    Smoking status: Never Smoker    Smokeless tobacco: Never Used   Substance and Sexual Activity    Alcohol use: No    Drug use: No    Sexual activity: Yes     Partners: Male     Birth control/protection: None, Condom     Comment:  since   "  Lifestyle    Physical activity     Days per week: Not on file     Minutes per session: Not on file    Stress: Not on file   Relationships    Social connections     Talks on phone: Not on file     Gets together: Not on file     Attends Caodaism service: Not on file     Active member of club or organization: Not on file     Attends meetings of clubs or organizations: Not on file     Relationship status: Not on file   Other Topics Concern    Not on file   Social History Narrative    Not on file     Family History   Problem Relation Age of Onset    Lupus Mother     Hypertension Mother     Diabetes Father     Hypertension Maternal Grandmother     Asthma Maternal Grandmother     Breast cancer Neg Hx     Colon cancer Neg Hx     Ovarian cancer Neg Hx      OB History        2    Para   2    Term   2            AB        Living   2       SAB        TAB        Ectopic        Multiple   0    Live Births   2                 /76   Ht 5' 2" (1.575 m)   Wt 72.3 kg (159 lb 6.3 oz)   LMP 10/19/2020   BMI 29.15 kg/m²         GYN & OB History  Patient's last menstrual period was 10/19/2020.   Date of Last Pap: No result found    OB History    Para Term  AB Living   2 2 2     2   SAB TAB Ectopic Multiple Live Births         0 2      # Outcome Date GA Lbr Ramesh/2nd Weight Sex Delivery Anes PTL Lv   2 Term 17 39w2d 16:15 / 01:03 3.402 kg (7 lb 8 oz) F  EPI N EMMA   1 Term 01/07/15 37w6d  3.459 kg (7 lb 10 oz) F CS-LTranv Spinal N EMMA      Complications: Breech birth       Review of Systems  Review of Systems   Constitutional: Negative for activity change, appetite change, fatigue and unexpected weight change.   HENT: Negative.    Eyes: Negative for visual disturbance.   Respiratory: Negative for shortness of breath and wheezing.    Cardiovascular: Negative for chest pain, palpitations and leg swelling.   Gastrointestinal: Negative for abdominal pain, bloating and blood in stool. "   Endocrine: Negative for diabetes and hair loss.   Genitourinary: Negative for decreased libido, dyspareunia, menstrual problem and vaginal discharge.   Musculoskeletal: Negative for back pain and joint swelling.   Integumentary:  Negative for acne, hair changes and nipple discharge.   Neurological: Negative for headaches.   Hematological: Does not bruise/bleed easily.   Psychiatric/Behavioral: Negative for depression and sleep disturbance. The patient is not nervous/anxious.    Breast: Negative for mastodynia and nipple discharge          Objective:      Physical Exam:   Constitutional: She is oriented to person, place, and time. She appears well-developed and well-nourished.    HENT:   Head: Normocephalic and atraumatic.    Eyes: Pupils are equal, round, and reactive to light. EOM are normal.    Neck: Normal range of motion. Neck supple.    Cardiovascular: Normal rate and regular rhythm.     Pulmonary/Chest: Effort normal and breath sounds normal.   BREASTS:  no mass, no tenderness, no deformity and no retraction. Right breast exhibits no inverted nipple, no mass, no nipple discharge, no skin change, no tenderness, no bleeding and no swelling. Left breast exhibits no inverted nipple, no mass, no nipple discharge, no skin change, no tenderness, no bleeding and no swelling. Breasts are symmetrical.              Abdominal: Soft. Bowel sounds are normal.     Genitourinary:    Pelvic exam was performed with patient supine.      Genitourinary Comments: PELVIC: Normal external genitalia without lesions. Mild erythema/peeling of labia majora. No ulcerations.  Normal hair distribution.  Adequate perineal body, normal urethral meatus.  Vagina moist and well rugated without lesions or discharge.  Cervix pink, without lesions, discharge or tenderness.  No significant cystocele or rectocele.  Bimanual exam shows uterus to be normal size, regular, mobile and nontender.  Adnexa without masses or tenderness.                  Musculoskeletal: Normal range of motion and moves all extremeties.       Neurological: She is alert and oriented to person, place, and time.    Skin: Skin is warm and dry.    Psychiatric: She has a normal mood and affect.              Assessment:        1. Encounter for gynecological examination without abnormal finding    2. Pap smear for cervical cancer screening    3. Vulvovaginitis                Plan:        1. Encounter for gynecological examination without abnormal finding    COUNSELING:  The patient was counseled today on regular weight bearing exercise. Patient was counseled today on the new ACS guidelines for cervical cytology screening as well as the current recommendations for breast cancer screening. Counseling session lasted approximately 10 minutes, and all her questions were answered. She was advised to see her primary care physician for all other health maintenance.   FOLLOW-UP with me for next routine visit.       2. Pap smear for cervical cancer screening    - Liquid-Based Pap Smear, Screening    3. Vulvovaginitis  Advised to change from Always brand pads to another non dri-weave type.   - clotrimazole-betamethasone 1-0.05% (LOTRISONE) cream; Apply to affected area 2 times daily  Dispense: 15 g; Refill: 1       Follow up in about 1 year (around 11/16/2021).

## 2020-12-02 ENCOUNTER — PATIENT MESSAGE (OUTPATIENT)
Dept: OBSTETRICS AND GYNECOLOGY | Facility: CLINIC | Age: 30
End: 2020-12-02

## 2020-12-21 ENCOUNTER — PATIENT MESSAGE (OUTPATIENT)
Dept: OBSTETRICS AND GYNECOLOGY | Facility: CLINIC | Age: 30
End: 2020-12-21

## 2020-12-30 LAB
FINAL PATHOLOGIC DIAGNOSIS: NORMAL
Lab: NORMAL

## 2021-01-12 ENCOUNTER — TELEPHONE (OUTPATIENT)
Dept: OBSTETRICS AND GYNECOLOGY | Facility: CLINIC | Age: 31
End: 2021-01-12

## 2021-01-12 DIAGNOSIS — Z36.3 ENCOUNTER FOR ANTENATAL SCREENING FOR MALFORMATION USING ULTRASOUND: Primary | ICD-10-CM

## 2021-01-28 ENCOUNTER — PATIENT MESSAGE (OUTPATIENT)
Dept: OBSTETRICS AND GYNECOLOGY | Facility: CLINIC | Age: 31
End: 2021-01-28

## 2021-01-28 ENCOUNTER — PROCEDURE VISIT (OUTPATIENT)
Dept: OBSTETRICS AND GYNECOLOGY | Facility: CLINIC | Age: 31
End: 2021-01-28
Payer: MEDICAID

## 2021-01-28 ENCOUNTER — LAB VISIT (OUTPATIENT)
Dept: LAB | Facility: OTHER | Age: 31
End: 2021-01-28
Payer: MEDICAID

## 2021-01-28 ENCOUNTER — OFFICE VISIT (OUTPATIENT)
Dept: OBSTETRICS AND GYNECOLOGY | Facility: CLINIC | Age: 31
End: 2021-01-28
Payer: MEDICAID

## 2021-01-28 VITALS
DIASTOLIC BLOOD PRESSURE: 72 MMHG | HEIGHT: 62 IN | WEIGHT: 168.19 LBS | SYSTOLIC BLOOD PRESSURE: 114 MMHG | BODY MASS INDEX: 30.95 KG/M2

## 2021-01-28 DIAGNOSIS — O99.011 ANEMIA DURING PREGNANCY IN FIRST TRIMESTER: Primary | ICD-10-CM

## 2021-01-28 DIAGNOSIS — O21.9 NAUSEA/VOMITING IN PREGNANCY: Primary | ICD-10-CM

## 2021-01-28 DIAGNOSIS — N91.5 OLIGOMENORRHEA, UNSPECIFIED TYPE: Primary | ICD-10-CM

## 2021-01-28 DIAGNOSIS — Z36.3 ENCOUNTER FOR ANTENATAL SCREENING FOR MALFORMATION USING ULTRASOUND: ICD-10-CM

## 2021-01-28 DIAGNOSIS — Z32.01 POSITIVE PREGNANCY TEST: ICD-10-CM

## 2021-01-28 DIAGNOSIS — N91.5 OLIGOMENORRHEA, UNSPECIFIED TYPE: ICD-10-CM

## 2021-01-28 DIAGNOSIS — Z36.89 ESTABLISH GESTATIONAL AGE, ULTRASOUND: ICD-10-CM

## 2021-01-28 LAB
ABO + RH BLD: NORMAL
B-HCG UR QL: POSITIVE
BASOPHILS # BLD AUTO: 0.04 K/UL (ref 0–0.2)
BASOPHILS NFR BLD: 0.5 % (ref 0–1.9)
BLD GP AB SCN CELLS X3 SERPL QL: NORMAL
CTP QC/QA: YES
DIFFERENTIAL METHOD: ABNORMAL
EOSINOPHIL # BLD AUTO: 0 K/UL (ref 0–0.5)
EOSINOPHIL NFR BLD: 0.5 % (ref 0–8)
ERYTHROCYTE [DISTWIDTH] IN BLOOD BY AUTOMATED COUNT: 14 % (ref 11.5–14.5)
HCG INTACT+B SERPL-ACNC: NORMAL MIU/ML
HCT VFR BLD AUTO: 36 % (ref 37–48.5)
HGB BLD-MCNC: 11.7 G/DL (ref 12–16)
IMM GRANULOCYTES # BLD AUTO: 0.02 K/UL (ref 0–0.04)
IMM GRANULOCYTES NFR BLD AUTO: 0.3 % (ref 0–0.5)
LYMPHOCYTES # BLD AUTO: 1.4 K/UL (ref 1–4.8)
LYMPHOCYTES NFR BLD: 18.8 % (ref 18–48)
MCH RBC QN AUTO: 29.1 PG (ref 27–31)
MCHC RBC AUTO-ENTMCNC: 32.5 G/DL (ref 32–36)
MCV RBC AUTO: 90 FL (ref 82–98)
MONOCYTES # BLD AUTO: 0.6 K/UL (ref 0.3–1)
MONOCYTES NFR BLD: 7.7 % (ref 4–15)
NEUTROPHILS # BLD AUTO: 5.3 K/UL (ref 1.8–7.7)
NEUTROPHILS NFR BLD: 72.2 % (ref 38–73)
NRBC BLD-RTO: 0 /100 WBC
PLATELET # BLD AUTO: 389 K/UL (ref 150–350)
PMV BLD AUTO: 8.8 FL (ref 9.2–12.9)
RBC # BLD AUTO: 4.02 M/UL (ref 4–5.4)
WBC # BLD AUTO: 7.38 K/UL (ref 3.9–12.7)

## 2021-01-28 PROCEDURE — 86900 BLOOD TYPING SEROLOGIC ABO: CPT

## 2021-01-28 PROCEDURE — 86592 SYPHILIS TEST NON-TREP QUAL: CPT

## 2021-01-28 PROCEDURE — 99999 PR PBB SHADOW E&M-EST. PATIENT-LVL III: ICD-10-PCS | Mod: PBBFAC,,, | Performed by: NURSE PRACTITIONER

## 2021-01-28 PROCEDURE — 76801 PR US, OB <14WKS, TRANSABD, SINGLE GESTATION: ICD-10-PCS | Mod: 26,S$PBB,, | Performed by: OBSTETRICS & GYNECOLOGY

## 2021-01-28 PROCEDURE — 87340 HEPATITIS B SURFACE AG IA: CPT

## 2021-01-28 PROCEDURE — 76801 OB US < 14 WKS SINGLE FETUS: CPT | Mod: 26,S$PBB,, | Performed by: OBSTETRICS & GYNECOLOGY

## 2021-01-28 PROCEDURE — 85025 COMPLETE CBC W/AUTO DIFF WBC: CPT

## 2021-01-28 PROCEDURE — 87077 CULTURE AEROBIC IDENTIFY: CPT

## 2021-01-28 PROCEDURE — 99999 PR PBB SHADOW E&M-EST. PATIENT-LVL III: CPT | Mod: PBBFAC,,, | Performed by: NURSE PRACTITIONER

## 2021-01-28 PROCEDURE — 87088 URINE BACTERIA CULTURE: CPT

## 2021-01-28 PROCEDURE — 99203 PR OFFICE/OUTPT VISIT, NEW, LEVL III, 30-44 MIN: ICD-10-PCS | Mod: S$PBB,TH,, | Performed by: NURSE PRACTITIONER

## 2021-01-28 PROCEDURE — 36415 COLL VENOUS BLD VENIPUNCTURE: CPT

## 2021-01-28 PROCEDURE — 87186 SC STD MICRODIL/AGAR DIL: CPT

## 2021-01-28 PROCEDURE — 86762 RUBELLA ANTIBODY: CPT

## 2021-01-28 PROCEDURE — 86703 HIV-1/HIV-2 1 RESULT ANTBDY: CPT

## 2021-01-28 PROCEDURE — 76801 OB US < 14 WKS SINGLE FETUS: CPT | Mod: PBBFAC | Performed by: OBSTETRICS & GYNECOLOGY

## 2021-01-28 PROCEDURE — 99213 OFFICE O/P EST LOW 20 MIN: CPT | Mod: PBBFAC,TH,25 | Performed by: NURSE PRACTITIONER

## 2021-01-28 PROCEDURE — 99203 OFFICE O/P NEW LOW 30 MIN: CPT | Mod: S$PBB,TH,, | Performed by: NURSE PRACTITIONER

## 2021-01-28 PROCEDURE — 87086 URINE CULTURE/COLONY COUNT: CPT

## 2021-01-28 PROCEDURE — 84702 CHORIONIC GONADOTROPIN TEST: CPT

## 2021-01-28 RX ORDER — IRON POLYSACCHARIDE COMPLEX 150 MG
150 CAPSULE ORAL DAILY
Qty: 30 CAPSULE | Refills: 6 | Status: SHIPPED | OUTPATIENT
Start: 2021-01-28 | End: 2021-05-27 | Stop reason: SDUPTHER

## 2021-01-28 RX ORDER — CICLOPIROX 80 MG/ML
SOLUTION TOPICAL
COMMUNITY
Start: 2020-12-15 | End: 2022-03-02

## 2021-01-29 LAB
HBV SURFACE AG SERPL QL IA: NEGATIVE
HIV 1+2 AB+HIV1 P24 AG SERPL QL IA: NEGATIVE
RPR SER QL: NORMAL
RUBV IGG SER-ACNC: 13.4 IU/ML
RUBV IGG SER-IMP: REACTIVE

## 2021-01-29 RX ORDER — DOXYLAMINE SUCCINATE AND PYRIDOXINE HYDROCHLORIDE, DELAYED RELEASE TABLETS 10 MG/10 MG 10; 10 MG/1; MG/1
2 TABLET, DELAYED RELEASE ORAL NIGHTLY
Qty: 100 TABLET | Refills: 0 | Status: ON HOLD | OUTPATIENT
Start: 2021-01-29 | End: 2021-09-09 | Stop reason: HOSPADM

## 2021-01-31 ENCOUNTER — PATIENT MESSAGE (OUTPATIENT)
Dept: OBSTETRICS AND GYNECOLOGY | Facility: CLINIC | Age: 31
End: 2021-01-31

## 2021-01-31 LAB — BACTERIA UR CULT: ABNORMAL

## 2021-02-01 ENCOUNTER — PATIENT MESSAGE (OUTPATIENT)
Dept: OBSTETRICS AND GYNECOLOGY | Facility: CLINIC | Age: 31
End: 2021-02-01

## 2021-02-01 DIAGNOSIS — O23.41 URINARY TRACT INFECTION IN MOTHER DURING FIRST TRIMESTER OF PREGNANCY: Primary | ICD-10-CM

## 2021-02-01 RX ORDER — NITROFURANTOIN 25; 75 MG/1; MG/1
100 CAPSULE ORAL 2 TIMES DAILY
Qty: 14 CAPSULE | Refills: 0 | Status: SHIPPED | OUTPATIENT
Start: 2021-02-01 | End: 2021-02-08

## 2021-03-01 ENCOUNTER — PATIENT MESSAGE (OUTPATIENT)
Dept: ADMINISTRATIVE | Facility: OTHER | Age: 31
End: 2021-03-01

## 2021-03-01 ENCOUNTER — INITIAL PRENATAL (OUTPATIENT)
Dept: OBSTETRICS AND GYNECOLOGY | Facility: CLINIC | Age: 31
End: 2021-03-01
Payer: MEDICAID

## 2021-03-01 VITALS
SYSTOLIC BLOOD PRESSURE: 130 MMHG | BODY MASS INDEX: 30.75 KG/M2 | WEIGHT: 168.13 LBS | DIASTOLIC BLOOD PRESSURE: 80 MMHG

## 2021-03-01 DIAGNOSIS — Z36.9 ANTENATAL SCREENING ENCOUNTER: ICD-10-CM

## 2021-03-01 DIAGNOSIS — Z34.80 SUPERVISION OF OTHER NORMAL PREGNANCY, ANTEPARTUM: Primary | ICD-10-CM

## 2021-03-01 PROCEDURE — 87086 URINE CULTURE/COLONY COUNT: CPT

## 2021-03-01 PROCEDURE — 99213 OFFICE O/P EST LOW 20 MIN: CPT | Mod: TH,S$PBB,, | Performed by: OBSTETRICS & GYNECOLOGY

## 2021-03-01 PROCEDURE — 99999 PR PBB SHADOW E&M-EST. PATIENT-LVL III: CPT | Mod: PBBFAC,,, | Performed by: OBSTETRICS & GYNECOLOGY

## 2021-03-01 PROCEDURE — 99213 OFFICE O/P EST LOW 20 MIN: CPT | Mod: PBBFAC,TH,PN | Performed by: OBSTETRICS & GYNECOLOGY

## 2021-03-01 PROCEDURE — 99999 PR PBB SHADOW E&M-EST. PATIENT-LVL III: ICD-10-PCS | Mod: PBBFAC,,, | Performed by: OBSTETRICS & GYNECOLOGY

## 2021-03-01 PROCEDURE — 99213 PR OFFICE/OUTPT VISIT, EST, LEVL III, 20-29 MIN: ICD-10-PCS | Mod: TH,S$PBB,, | Performed by: OBSTETRICS & GYNECOLOGY

## 2021-03-03 LAB
BACTERIA UR CULT: NORMAL
BACTERIA UR CULT: NORMAL

## 2021-03-12 ENCOUNTER — PATIENT MESSAGE (OUTPATIENT)
Dept: ADMINISTRATIVE | Facility: OTHER | Age: 31
End: 2021-03-12

## 2021-03-19 ENCOUNTER — PATIENT MESSAGE (OUTPATIENT)
Dept: ADMINISTRATIVE | Facility: OTHER | Age: 31
End: 2021-03-19

## 2021-04-07 ENCOUNTER — ROUTINE PRENATAL (OUTPATIENT)
Dept: OBSTETRICS AND GYNECOLOGY | Facility: CLINIC | Age: 31
End: 2021-04-07
Payer: MEDICAID

## 2021-04-07 VITALS
DIASTOLIC BLOOD PRESSURE: 76 MMHG | SYSTOLIC BLOOD PRESSURE: 124 MMHG | BODY MASS INDEX: 30.93 KG/M2 | WEIGHT: 169.06 LBS

## 2021-04-07 DIAGNOSIS — Z3A.15 15 WEEKS GESTATION OF PREGNANCY: Primary | ICD-10-CM

## 2021-04-07 PROCEDURE — 99212 PR OFFICE/OUTPT VISIT, EST, LEVL II, 10-19 MIN: ICD-10-PCS | Mod: TH,S$PBB,, | Performed by: NURSE PRACTITIONER

## 2021-04-07 PROCEDURE — 99999 PR PBB SHADOW E&M-EST. PATIENT-LVL III: CPT | Mod: PBBFAC,,, | Performed by: NURSE PRACTITIONER

## 2021-04-07 PROCEDURE — 99999 PR PBB SHADOW E&M-EST. PATIENT-LVL III: ICD-10-PCS | Mod: PBBFAC,,, | Performed by: NURSE PRACTITIONER

## 2021-04-07 PROCEDURE — 99212 OFFICE O/P EST SF 10 MIN: CPT | Mod: TH,S$PBB,, | Performed by: NURSE PRACTITIONER

## 2021-04-07 PROCEDURE — 99213 OFFICE O/P EST LOW 20 MIN: CPT | Mod: PBBFAC,PN | Performed by: NURSE PRACTITIONER

## 2021-04-12 ENCOUNTER — PATIENT MESSAGE (OUTPATIENT)
Dept: OBSTETRICS AND GYNECOLOGY | Facility: CLINIC | Age: 31
End: 2021-04-12

## 2021-04-30 ENCOUNTER — PROCEDURE VISIT (OUTPATIENT)
Dept: MATERNAL FETAL MEDICINE | Facility: CLINIC | Age: 31
End: 2021-04-30
Payer: MEDICAID

## 2021-04-30 DIAGNOSIS — Z36.9 ANTENATAL SCREENING ENCOUNTER: ICD-10-CM

## 2021-04-30 DIAGNOSIS — Z36.89 ENCOUNTER FOR FETAL ANATOMIC SURVEY: Primary | ICD-10-CM

## 2021-04-30 PROCEDURE — 76805 PR US, OB 14+WKS, TRANSABD, SINGLE GESTATION: ICD-10-PCS | Mod: 26,S$PBB,, | Performed by: OBSTETRICS & GYNECOLOGY

## 2021-04-30 PROCEDURE — 76805 OB US >/= 14 WKS SNGL FETUS: CPT | Mod: PBBFAC | Performed by: OBSTETRICS & GYNECOLOGY

## 2021-04-30 PROCEDURE — 76805 OB US >/= 14 WKS SNGL FETUS: CPT | Mod: 26,S$PBB,, | Performed by: OBSTETRICS & GYNECOLOGY

## 2021-05-27 ENCOUNTER — ROUTINE PRENATAL (OUTPATIENT)
Dept: OBSTETRICS AND GYNECOLOGY | Facility: CLINIC | Age: 31
End: 2021-05-27
Payer: MEDICAID

## 2021-05-27 VITALS
SYSTOLIC BLOOD PRESSURE: 116 MMHG | WEIGHT: 172.63 LBS | BODY MASS INDEX: 31.57 KG/M2 | DIASTOLIC BLOOD PRESSURE: 58 MMHG

## 2021-05-27 DIAGNOSIS — O99.011 ANEMIA DURING PREGNANCY IN FIRST TRIMESTER: ICD-10-CM

## 2021-05-27 DIAGNOSIS — Z34.80 SUPERVISION OF OTHER NORMAL PREGNANCY, ANTEPARTUM: Primary | ICD-10-CM

## 2021-05-27 PROCEDURE — 99213 OFFICE O/P EST LOW 20 MIN: CPT | Mod: PBBFAC,TH | Performed by: OBSTETRICS & GYNECOLOGY

## 2021-05-27 PROCEDURE — 99999 PR PBB SHADOW E&M-EST. PATIENT-LVL III: CPT | Mod: PBBFAC,,, | Performed by: OBSTETRICS & GYNECOLOGY

## 2021-05-27 PROCEDURE — 99213 OFFICE O/P EST LOW 20 MIN: CPT | Mod: TH,S$PBB,, | Performed by: OBSTETRICS & GYNECOLOGY

## 2021-05-27 PROCEDURE — 99213 PR OFFICE/OUTPT VISIT, EST, LEVL III, 20-29 MIN: ICD-10-PCS | Mod: TH,S$PBB,, | Performed by: OBSTETRICS & GYNECOLOGY

## 2021-05-27 PROCEDURE — 99999 PR PBB SHADOW E&M-EST. PATIENT-LVL III: ICD-10-PCS | Mod: PBBFAC,,, | Performed by: OBSTETRICS & GYNECOLOGY

## 2021-05-27 RX ORDER — IRON POLYSACCHARIDE COMPLEX 150 MG
150 CAPSULE ORAL DAILY
Qty: 30 CAPSULE | Refills: 11 | Status: SHIPPED | OUTPATIENT
Start: 2021-05-27 | End: 2021-08-23

## 2021-06-11 ENCOUNTER — PATIENT MESSAGE (OUTPATIENT)
Dept: ADMINISTRATIVE | Facility: OTHER | Age: 31
End: 2021-06-11

## 2021-06-24 ENCOUNTER — LAB VISIT (OUTPATIENT)
Dept: LAB | Facility: OTHER | Age: 31
End: 2021-06-24
Attending: OBSTETRICS & GYNECOLOGY
Payer: MEDICAID

## 2021-06-24 DIAGNOSIS — Z34.80 SUPERVISION OF OTHER NORMAL PREGNANCY, ANTEPARTUM: ICD-10-CM

## 2021-06-24 LAB
BASOPHILS # BLD AUTO: 0.02 K/UL (ref 0–0.2)
BASOPHILS NFR BLD: 0.2 % (ref 0–1.9)
DIFFERENTIAL METHOD: ABNORMAL
EOSINOPHIL # BLD AUTO: 0.1 K/UL (ref 0–0.5)
EOSINOPHIL NFR BLD: 1.3 % (ref 0–8)
ERYTHROCYTE [DISTWIDTH] IN BLOOD BY AUTOMATED COUNT: 14 % (ref 11.5–14.5)
GLUCOSE SERPL-MCNC: 132 MG/DL (ref 70–140)
HCT VFR BLD AUTO: 33.2 % (ref 37–48.5)
HGB BLD-MCNC: 10.7 G/DL (ref 12–16)
IMM GRANULOCYTES # BLD AUTO: 0.06 K/UL (ref 0–0.04)
IMM GRANULOCYTES NFR BLD AUTO: 0.6 % (ref 0–0.5)
LYMPHOCYTES # BLD AUTO: 1.7 K/UL (ref 1–4.8)
LYMPHOCYTES NFR BLD: 18.6 % (ref 18–48)
MCH RBC QN AUTO: 28.9 PG (ref 27–31)
MCHC RBC AUTO-ENTMCNC: 32.2 G/DL (ref 32–36)
MCV RBC AUTO: 90 FL (ref 82–98)
MONOCYTES # BLD AUTO: 0.6 K/UL (ref 0.3–1)
MONOCYTES NFR BLD: 6.2 % (ref 4–15)
NEUTROPHILS # BLD AUTO: 6.8 K/UL (ref 1.8–7.7)
NEUTROPHILS NFR BLD: 73.1 % (ref 38–73)
NRBC BLD-RTO: 0 /100 WBC
PLATELET # BLD AUTO: 333 K/UL (ref 150–450)
PMV BLD AUTO: 9.2 FL (ref 9.2–12.9)
RBC # BLD AUTO: 3.7 M/UL (ref 4–5.4)
WBC # BLD AUTO: 9.26 K/UL (ref 3.9–12.7)

## 2021-06-24 PROCEDURE — 82950 GLUCOSE TEST: CPT | Performed by: OBSTETRICS & GYNECOLOGY

## 2021-06-24 PROCEDURE — 36415 COLL VENOUS BLD VENIPUNCTURE: CPT | Performed by: OBSTETRICS & GYNECOLOGY

## 2021-06-24 PROCEDURE — 85025 COMPLETE CBC W/AUTO DIFF WBC: CPT | Performed by: OBSTETRICS & GYNECOLOGY

## 2021-06-25 ENCOUNTER — PATIENT MESSAGE (OUTPATIENT)
Dept: OBSTETRICS AND GYNECOLOGY | Facility: CLINIC | Age: 31
End: 2021-06-25

## 2021-06-25 RX ORDER — FERROUS SULFATE 325(65) MG
325 TABLET, DELAYED RELEASE (ENTERIC COATED) ORAL DAILY
Qty: 30 TABLET | Refills: 3 | Status: SHIPPED | OUTPATIENT
Start: 2021-06-25 | End: 2022-06-25

## 2021-07-02 ENCOUNTER — PATIENT MESSAGE (OUTPATIENT)
Dept: ADMINISTRATIVE | Facility: OTHER | Age: 31
End: 2021-07-02

## 2021-07-02 ENCOUNTER — CLINICAL SUPPORT (OUTPATIENT)
Dept: OBSTETRICS AND GYNECOLOGY | Facility: CLINIC | Age: 31
End: 2021-07-02
Payer: MEDICAID

## 2021-07-02 ENCOUNTER — ROUTINE PRENATAL (OUTPATIENT)
Dept: OBSTETRICS AND GYNECOLOGY | Facility: CLINIC | Age: 31
End: 2021-07-02
Payer: MEDICAID

## 2021-07-02 VITALS
WEIGHT: 177.25 LBS | SYSTOLIC BLOOD PRESSURE: 114 MMHG | DIASTOLIC BLOOD PRESSURE: 70 MMHG | BODY MASS INDEX: 32.42 KG/M2

## 2021-07-02 DIAGNOSIS — O26.843 UTERINE SIZE DATE DISCREPANCY PREGNANCY, THIRD TRIMESTER: ICD-10-CM

## 2021-07-02 DIAGNOSIS — Z23 NEED FOR DIPHTHERIA-TETANUS-PERTUSSIS (TDAP) VACCINE: Primary | ICD-10-CM

## 2021-07-02 DIAGNOSIS — Z34.80 SUPERVISION OF OTHER NORMAL PREGNANCY, ANTEPARTUM: Primary | ICD-10-CM

## 2021-07-02 PROCEDURE — 99999 PR PBB SHADOW E&M-EST. PATIENT-LVL III: ICD-10-PCS | Mod: PBBFAC,,, | Performed by: OBSTETRICS & GYNECOLOGY

## 2021-07-02 PROCEDURE — 99213 OFFICE O/P EST LOW 20 MIN: CPT | Mod: TH,S$PBB,, | Performed by: OBSTETRICS & GYNECOLOGY

## 2021-07-02 PROCEDURE — 99213 PR OFFICE/OUTPT VISIT, EST, LEVL III, 20-29 MIN: ICD-10-PCS | Mod: TH,S$PBB,, | Performed by: OBSTETRICS & GYNECOLOGY

## 2021-07-02 PROCEDURE — 99999 PR PBB SHADOW E&M-EST. PATIENT-LVL III: CPT | Mod: PBBFAC,,, | Performed by: OBSTETRICS & GYNECOLOGY

## 2021-07-02 PROCEDURE — 90471 IMMUNIZATION ADMIN: CPT | Mod: PBBFAC

## 2021-07-02 PROCEDURE — 99213 OFFICE O/P EST LOW 20 MIN: CPT | Mod: PBBFAC,TH | Performed by: OBSTETRICS & GYNECOLOGY

## 2021-07-15 ENCOUNTER — PATIENT MESSAGE (OUTPATIENT)
Dept: OBSTETRICS AND GYNECOLOGY | Facility: CLINIC | Age: 31
End: 2021-07-15

## 2021-07-23 ENCOUNTER — TELEPHONE (OUTPATIENT)
Dept: OBSTETRICS AND GYNECOLOGY | Facility: CLINIC | Age: 31
End: 2021-07-23

## 2021-07-29 ENCOUNTER — PATIENT MESSAGE (OUTPATIENT)
Dept: OBSTETRICS AND GYNECOLOGY | Facility: CLINIC | Age: 31
End: 2021-07-29

## 2021-07-29 ENCOUNTER — TELEPHONE (OUTPATIENT)
Dept: OBSTETRICS AND GYNECOLOGY | Facility: CLINIC | Age: 31
End: 2021-07-29

## 2021-07-29 ENCOUNTER — PROCEDURE VISIT (OUTPATIENT)
Dept: MATERNAL FETAL MEDICINE | Facility: CLINIC | Age: 31
End: 2021-07-29
Payer: MEDICAID

## 2021-07-29 ENCOUNTER — ROUTINE PRENATAL (OUTPATIENT)
Dept: OBSTETRICS AND GYNECOLOGY | Facility: CLINIC | Age: 31
End: 2021-07-29
Payer: MEDICAID

## 2021-07-29 VITALS
DIASTOLIC BLOOD PRESSURE: 60 MMHG | SYSTOLIC BLOOD PRESSURE: 118 MMHG | BODY MASS INDEX: 32.78 KG/M2 | WEIGHT: 179.25 LBS

## 2021-07-29 DIAGNOSIS — O26.843 UTERINE SIZE DATE DISCREPANCY PREGNANCY, THIRD TRIMESTER: ICD-10-CM

## 2021-07-29 DIAGNOSIS — Z34.80 SUPERVISION OF OTHER NORMAL PREGNANCY, ANTEPARTUM: Primary | ICD-10-CM

## 2021-07-29 PROCEDURE — 76816 OB US FOLLOW-UP PER FETUS: CPT | Mod: PBBFAC | Performed by: OBSTETRICS & GYNECOLOGY

## 2021-07-29 PROCEDURE — 99213 PR OFFICE/OUTPT VISIT, EST, LEVL III, 20-29 MIN: ICD-10-PCS | Mod: TH,S$PBB,, | Performed by: OBSTETRICS & GYNECOLOGY

## 2021-07-29 PROCEDURE — 99213 OFFICE O/P EST LOW 20 MIN: CPT | Mod: TH,S$PBB,, | Performed by: OBSTETRICS & GYNECOLOGY

## 2021-07-29 PROCEDURE — 76816 PR  US,PREGNANT UTERUS,F/U,TRANSABD APP: ICD-10-PCS | Mod: 26,S$PBB,, | Performed by: OBSTETRICS & GYNECOLOGY

## 2021-07-29 PROCEDURE — 99211 OFF/OP EST MAY X REQ PHY/QHP: CPT | Mod: PBBFAC,TH | Performed by: OBSTETRICS & GYNECOLOGY

## 2021-07-29 PROCEDURE — 76816 OB US FOLLOW-UP PER FETUS: CPT | Mod: 26,S$PBB,, | Performed by: OBSTETRICS & GYNECOLOGY

## 2021-07-29 PROCEDURE — 99999 PR PBB SHADOW E&M-EST. PATIENT-LVL I: CPT | Mod: PBBFAC,,, | Performed by: OBSTETRICS & GYNECOLOGY

## 2021-07-29 PROCEDURE — 99999 PR PBB SHADOW E&M-EST. PATIENT-LVL I: ICD-10-PCS | Mod: PBBFAC,,, | Performed by: OBSTETRICS & GYNECOLOGY

## 2021-07-30 ENCOUNTER — PATIENT MESSAGE (OUTPATIENT)
Dept: ADMINISTRATIVE | Facility: OTHER | Age: 31
End: 2021-07-30

## 2021-08-19 ENCOUNTER — ROUTINE PRENATAL (OUTPATIENT)
Dept: OBSTETRICS AND GYNECOLOGY | Facility: CLINIC | Age: 31
End: 2021-08-19
Payer: MEDICAID

## 2021-08-19 VITALS
SYSTOLIC BLOOD PRESSURE: 110 MMHG | WEIGHT: 182.75 LBS | DIASTOLIC BLOOD PRESSURE: 64 MMHG | BODY MASS INDEX: 33.43 KG/M2

## 2021-08-19 DIAGNOSIS — Z34.80 SUPERVISION OF OTHER NORMAL PREGNANCY, ANTEPARTUM: Primary | ICD-10-CM

## 2021-08-19 PROCEDURE — 99211 OFF/OP EST MAY X REQ PHY/QHP: CPT | Mod: PBBFAC,TH | Performed by: OBSTETRICS & GYNECOLOGY

## 2021-08-19 PROCEDURE — 99213 OFFICE O/P EST LOW 20 MIN: CPT | Mod: TH,S$PBB,, | Performed by: OBSTETRICS & GYNECOLOGY

## 2021-08-19 PROCEDURE — 99999 PR PBB SHADOW E&M-EST. PATIENT-LVL I: ICD-10-PCS | Mod: PBBFAC,,, | Performed by: OBSTETRICS & GYNECOLOGY

## 2021-08-19 PROCEDURE — 99213 PR OFFICE/OUTPT VISIT, EST, LEVL III, 20-29 MIN: ICD-10-PCS | Mod: TH,S$PBB,, | Performed by: OBSTETRICS & GYNECOLOGY

## 2021-08-19 PROCEDURE — 99999 PR PBB SHADOW E&M-EST. PATIENT-LVL I: CPT | Mod: PBBFAC,,, | Performed by: OBSTETRICS & GYNECOLOGY

## 2021-08-30 ENCOUNTER — PATIENT MESSAGE (OUTPATIENT)
Dept: OBSTETRICS AND GYNECOLOGY | Facility: CLINIC | Age: 31
End: 2021-08-30

## 2021-09-06 ENCOUNTER — TELEPHONE (OUTPATIENT)
Dept: OBSTETRICS AND GYNECOLOGY | Facility: CLINIC | Age: 31
End: 2021-09-06

## 2021-09-07 ENCOUNTER — ANESTHESIA EVENT (OUTPATIENT)
Dept: OBSTETRICS AND GYNECOLOGY | Facility: OTHER | Age: 31
End: 2021-09-07
Payer: MEDICAID

## 2021-09-07 ENCOUNTER — HOSPITAL ENCOUNTER (INPATIENT)
Facility: OTHER | Age: 31
LOS: 2 days | Discharge: HOME | End: 2021-09-09
Attending: OBSTETRICS & GYNECOLOGY | Admitting: STUDENT IN AN ORGANIZED HEALTH CARE EDUCATION/TRAINING PROGRAM
Payer: MEDICAID

## 2021-09-07 ENCOUNTER — PATIENT MESSAGE (OUTPATIENT)
Dept: OBSTETRICS AND GYNECOLOGY | Facility: CLINIC | Age: 31
End: 2021-09-07

## 2021-09-07 ENCOUNTER — ANESTHESIA (OUTPATIENT)
Dept: OBSTETRICS AND GYNECOLOGY | Facility: OTHER | Age: 31
End: 2021-09-07
Payer: MEDICAID

## 2021-09-07 DIAGNOSIS — Z37.9 NORMAL LABOR: Primary | ICD-10-CM

## 2021-09-07 DIAGNOSIS — O34.219 VBAC (VAGINAL BIRTH AFTER CESAREAN): ICD-10-CM

## 2021-09-07 DIAGNOSIS — Z3A.37 37 WEEKS GESTATION OF PREGNANCY: ICD-10-CM

## 2021-09-07 LAB
ABO + RH BLD: NORMAL
BASOPHILS # BLD AUTO: 0.03 K/UL (ref 0–0.2)
BASOPHILS NFR BLD: 0.3 % (ref 0–1.9)
BLD GP AB SCN CELLS X3 SERPL QL: NORMAL
DIFFERENTIAL METHOD: ABNORMAL
EOSINOPHIL # BLD AUTO: 0.1 K/UL (ref 0–0.5)
EOSINOPHIL NFR BLD: 0.5 % (ref 0–8)
ERYTHROCYTE [DISTWIDTH] IN BLOOD BY AUTOMATED COUNT: 14.3 % (ref 11.5–14.5)
HCT VFR BLD AUTO: 35.8 % (ref 37–48.5)
HGB BLD-MCNC: 11.9 G/DL (ref 12–16)
HIV1+2 IGG SERPL QL IA.RAPID: NORMAL
IMM GRANULOCYTES # BLD AUTO: 0.03 K/UL (ref 0–0.04)
IMM GRANULOCYTES NFR BLD AUTO: 0.3 % (ref 0–0.5)
LYMPHOCYTES # BLD AUTO: 1.8 K/UL (ref 1–4.8)
LYMPHOCYTES NFR BLD: 18.4 % (ref 18–48)
MCH RBC QN AUTO: 29.5 PG (ref 27–31)
MCHC RBC AUTO-ENTMCNC: 33.2 G/DL (ref 32–36)
MCV RBC AUTO: 89 FL (ref 82–98)
MONOCYTES # BLD AUTO: 0.9 K/UL (ref 0.3–1)
MONOCYTES NFR BLD: 8.9 % (ref 4–15)
NEUTROPHILS # BLD AUTO: 6.9 K/UL (ref 1.8–7.7)
NEUTROPHILS NFR BLD: 71.6 % (ref 38–73)
NRBC BLD-RTO: 0 /100 WBC
PLATELET # BLD AUTO: 312 K/UL (ref 150–450)
PMV BLD AUTO: 10 FL (ref 9.2–12.9)
RBC # BLD AUTO: 4.03 M/UL (ref 4–5.4)
SARS-COV-2 RDRP RESP QL NAA+PROBE: NEGATIVE
WBC # BLD AUTO: 9.59 K/UL (ref 3.9–12.7)

## 2021-09-07 PROCEDURE — 59025 FETAL NON-STRESS TEST: CPT

## 2021-09-07 PROCEDURE — 86592 SYPHILIS TEST NON-TREP QUAL: CPT | Performed by: STUDENT IN AN ORGANIZED HEALTH CARE EDUCATION/TRAINING PROGRAM

## 2021-09-07 PROCEDURE — 85025 COMPLETE CBC W/AUTO DIFF WBC: CPT | Performed by: OBSTETRICS & GYNECOLOGY

## 2021-09-07 PROCEDURE — 25000003 PHARM REV CODE 250: Performed by: STUDENT IN AN ORGANIZED HEALTH CARE EDUCATION/TRAINING PROGRAM

## 2021-09-07 PROCEDURE — 87081 CULTURE SCREEN ONLY: CPT | Performed by: OBSTETRICS & GYNECOLOGY

## 2021-09-07 PROCEDURE — 59409 PRA ETRICAL CARE,VAG DELIV ONLY: ICD-10-PCS | Mod: AA,,, | Performed by: ANESTHESIOLOGY

## 2021-09-07 PROCEDURE — 59612 PR VAG DELIV ONLY,PREV C-SECTN: ICD-10-PCS | Mod: AT,,, | Performed by: STUDENT IN AN ORGANIZED HEALTH CARE EDUCATION/TRAINING PROGRAM

## 2021-09-07 PROCEDURE — 27200710 HC EPIDURAL INFUSION PUMP SET: Performed by: ANESTHESIOLOGY

## 2021-09-07 PROCEDURE — 11000001 HC ACUTE MED/SURG PRIVATE ROOM

## 2021-09-07 PROCEDURE — 72100002 HC LABOR CARE, 1ST 8 HOURS

## 2021-09-07 PROCEDURE — 62326 NJX INTERLAMINAR LMBR/SAC: CPT | Performed by: STUDENT IN AN ORGANIZED HEALTH CARE EDUCATION/TRAINING PROGRAM

## 2021-09-07 PROCEDURE — 99283 PR EMERGENCY DEPT VISIT,LEVEL III: ICD-10-PCS | Mod: 25,,, | Performed by: OBSTETRICS & GYNECOLOGY

## 2021-09-07 PROCEDURE — 59025 FETAL NON-STRESS TEST: CPT | Mod: 26,,, | Performed by: OBSTETRICS & GYNECOLOGY

## 2021-09-07 PROCEDURE — 86900 BLOOD TYPING SEROLOGIC ABO: CPT | Performed by: OBSTETRICS & GYNECOLOGY

## 2021-09-07 PROCEDURE — 86703 HIV-1/HIV-2 1 RESULT ANTBDY: CPT | Performed by: STUDENT IN AN ORGANIZED HEALTH CARE EDUCATION/TRAINING PROGRAM

## 2021-09-07 PROCEDURE — 59025 PR FETAL 2N-STRESS TEST: ICD-10-PCS | Mod: 26,,, | Performed by: OBSTETRICS & GYNECOLOGY

## 2021-09-07 PROCEDURE — C1751 CATH, INF, PER/CENT/MIDLINE: HCPCS | Performed by: ANESTHESIOLOGY

## 2021-09-07 PROCEDURE — 63600175 PHARM REV CODE 636 W HCPCS: Performed by: STUDENT IN AN ORGANIZED HEALTH CARE EDUCATION/TRAINING PROGRAM

## 2021-09-07 PROCEDURE — 99285 EMERGENCY DEPT VISIT HI MDM: CPT | Mod: 25

## 2021-09-07 PROCEDURE — 99283 EMERGENCY DEPT VISIT LOW MDM: CPT | Mod: 25,,, | Performed by: OBSTETRICS & GYNECOLOGY

## 2021-09-07 PROCEDURE — 51702 INSERT TEMP BLADDER CATH: CPT

## 2021-09-07 PROCEDURE — U0002 COVID-19 LAB TEST NON-CDC: HCPCS | Performed by: OBSTETRICS & GYNECOLOGY

## 2021-09-07 PROCEDURE — 59409 OBSTETRICAL CARE: CPT | Mod: AA,,, | Performed by: ANESTHESIOLOGY

## 2021-09-07 RX ORDER — LIDOCAINE HYDROCHLORIDE AND EPINEPHRINE 15; 5 MG/ML; UG/ML
INJECTION, SOLUTION EPIDURAL
Status: DISCONTINUED | OUTPATIENT
Start: 2021-09-07 | End: 2021-09-07

## 2021-09-07 RX ORDER — OXYTOCIN/RINGER'S LACTATE 30/500 ML
95 PLASTIC BAG, INJECTION (ML) INTRAVENOUS ONCE
Status: DISCONTINUED | OUTPATIENT
Start: 2021-09-07 | End: 2021-09-08

## 2021-09-07 RX ORDER — SIMETHICONE 80 MG
1 TABLET,CHEWABLE ORAL 4 TIMES DAILY PRN
Status: DISCONTINUED | OUTPATIENT
Start: 2021-09-07 | End: 2021-09-08 | Stop reason: SDUPTHER

## 2021-09-07 RX ORDER — OXYTOCIN/RINGER'S LACTATE 30/500 ML
334 PLASTIC BAG, INJECTION (ML) INTRAVENOUS ONCE
Status: DISCONTINUED | OUTPATIENT
Start: 2021-09-07 | End: 2021-09-08

## 2021-09-07 RX ORDER — METHYLERGONOVINE MALEATE 0.2 MG/ML
200 INJECTION INTRAVENOUS
Status: DISCONTINUED | OUTPATIENT
Start: 2021-09-07 | End: 2021-09-08

## 2021-09-07 RX ORDER — CEFAZOLIN SODIUM 2 G/50ML
2 SOLUTION INTRAVENOUS ONCE AS NEEDED
Status: DISCONTINUED | OUTPATIENT
Start: 2021-09-07 | End: 2021-09-08

## 2021-09-07 RX ORDER — TRANEXAMIC ACID 100 MG/ML
1000 INJECTION, SOLUTION INTRAVENOUS ONCE AS NEEDED
Status: DISCONTINUED | OUTPATIENT
Start: 2021-09-07 | End: 2021-09-08

## 2021-09-07 RX ORDER — FENTANYL/BUPIVACAINE/NS/PF 2MCG/ML-.1
PLASTIC BAG, INJECTION (ML) INJECTION
Status: COMPLETED
Start: 2021-09-07 | End: 2021-09-07

## 2021-09-07 RX ORDER — FENTANYL/BUPIVACAINE/NS/PF 2MCG/ML-.1
PLASTIC BAG, INJECTION (ML) INJECTION CONTINUOUS PRN
Status: DISCONTINUED | OUTPATIENT
Start: 2021-09-07 | End: 2021-09-07

## 2021-09-07 RX ORDER — MISOPROSTOL 200 UG/1
800 TABLET ORAL
Status: DISCONTINUED | OUTPATIENT
Start: 2021-09-07 | End: 2021-09-08

## 2021-09-07 RX ORDER — BUPIVACAINE HYDROCHLORIDE 2.5 MG/ML
INJECTION, SOLUTION EPIDURAL; INFILTRATION; INTRACAUDAL
Status: DISPENSED
Start: 2021-09-07 | End: 2021-09-08

## 2021-09-07 RX ORDER — FENTANYL CITRATE 50 UG/ML
INJECTION, SOLUTION INTRAMUSCULAR; INTRAVENOUS
Status: DISCONTINUED | OUTPATIENT
Start: 2021-09-07 | End: 2021-09-07

## 2021-09-07 RX ORDER — CALCIUM CARBONATE 200(500)MG
500 TABLET,CHEWABLE ORAL 3 TIMES DAILY PRN
Status: DISCONTINUED | OUTPATIENT
Start: 2021-09-07 | End: 2021-09-08

## 2021-09-07 RX ORDER — SODIUM CHLORIDE, SODIUM LACTATE, POTASSIUM CHLORIDE, CALCIUM CHLORIDE 600; 310; 30; 20 MG/100ML; MG/100ML; MG/100ML; MG/100ML
INJECTION, SOLUTION INTRAVENOUS CONTINUOUS
Status: DISCONTINUED | OUTPATIENT
Start: 2021-09-07 | End: 2021-09-08

## 2021-09-07 RX ORDER — ONDANSETRON 8 MG/1
8 TABLET, ORALLY DISINTEGRATING ORAL EVERY 8 HOURS PRN
Status: DISCONTINUED | OUTPATIENT
Start: 2021-09-07 | End: 2021-09-08

## 2021-09-07 RX ORDER — SODIUM CITRATE AND CITRIC ACID MONOHYDRATE 334; 500 MG/5ML; MG/5ML
30 SOLUTION ORAL ONCE
Status: DISCONTINUED | OUTPATIENT
Start: 2021-09-07 | End: 2021-09-08

## 2021-09-07 RX ORDER — PROCHLORPERAZINE EDISYLATE 5 MG/ML
5 INJECTION INTRAMUSCULAR; INTRAVENOUS EVERY 6 HOURS PRN
Status: DISCONTINUED | OUTPATIENT
Start: 2021-09-07 | End: 2021-09-08

## 2021-09-07 RX ORDER — FENTANYL CITRATE 50 UG/ML
INJECTION, SOLUTION INTRAMUSCULAR; INTRAVENOUS
Status: COMPLETED
Start: 2021-09-07 | End: 2021-09-07

## 2021-09-07 RX ORDER — FAMOTIDINE 10 MG/ML
20 INJECTION INTRAVENOUS ONCE
Status: DISCONTINUED | OUTPATIENT
Start: 2021-09-07 | End: 2021-09-08

## 2021-09-07 RX ORDER — CARBOPROST TROMETHAMINE 250 UG/ML
250 INJECTION, SOLUTION INTRAMUSCULAR
Status: DISCONTINUED | OUTPATIENT
Start: 2021-09-07 | End: 2021-09-08

## 2021-09-07 RX ORDER — LANOLIN ALCOHOL/MO/W.PET/CERES
1 CREAM (GRAM) TOPICAL DAILY
Status: DISCONTINUED | OUTPATIENT
Start: 2021-09-08 | End: 2021-09-08

## 2021-09-07 RX ADMIN — SODIUM CHLORIDE, SODIUM LACTATE, POTASSIUM CHLORIDE, AND CALCIUM CHLORIDE 1000 ML: .6; .31; .03; .02 INJECTION, SOLUTION INTRAVENOUS at 05:09

## 2021-09-07 RX ADMIN — FENTANYL CITRATE 100 MCG: 50 INJECTION, SOLUTION INTRAMUSCULAR; INTRAVENOUS at 05:09

## 2021-09-07 RX ADMIN — Medication 10 ML: at 05:09

## 2021-09-07 RX ADMIN — DEXTROSE 5 MILLION UNITS: 50 INJECTION, SOLUTION INTRAVENOUS at 04:09

## 2021-09-07 RX ADMIN — LIDOCAINE HYDROCHLORIDE,EPINEPHRINE BITARTRATE 3 ML: 15; .005 INJECTION, SOLUTION EPIDURAL; INFILTRATION; INTRACAUDAL; PERINEURAL at 05:09

## 2021-09-07 RX ADMIN — Medication 10 ML/HR: at 05:09

## 2021-09-07 RX ADMIN — SODIUM CHLORIDE, SODIUM LACTATE, POTASSIUM CHLORIDE, AND CALCIUM CHLORIDE: .6; .31; .03; .02 INJECTION, SOLUTION INTRAVENOUS at 06:09

## 2021-09-07 RX ADMIN — DEXTROSE 3 MILLION UNITS: 50 INJECTION, SOLUTION INTRAVENOUS at 09:09

## 2021-09-08 PROCEDURE — 11000001 HC ACUTE MED/SURG PRIVATE ROOM

## 2021-09-08 PROCEDURE — 99231 SBSQ HOSP IP/OBS SF/LOW 25: CPT | Mod: ,,, | Performed by: OBSTETRICS & GYNECOLOGY

## 2021-09-08 PROCEDURE — 72200005 HC VAGINAL DELIVERY LEVEL II

## 2021-09-08 PROCEDURE — 99231 PR SUBSEQUENT HOSPITAL CARE,LEVL I: ICD-10-PCS | Mod: ,,, | Performed by: OBSTETRICS & GYNECOLOGY

## 2021-09-08 PROCEDURE — 25000003 PHARM REV CODE 250: Performed by: STUDENT IN AN ORGANIZED HEALTH CARE EDUCATION/TRAINING PROGRAM

## 2021-09-08 RX ORDER — DIPHENHYDRAMINE HCL 25 MG
25 CAPSULE ORAL EVERY 4 HOURS PRN
Status: DISCONTINUED | OUTPATIENT
Start: 2021-09-08 | End: 2021-09-09 | Stop reason: HOSPADM

## 2021-09-08 RX ORDER — HYDROCORTISONE 25 MG/G
CREAM TOPICAL 3 TIMES DAILY PRN
Status: DISCONTINUED | OUTPATIENT
Start: 2021-09-08 | End: 2021-09-09 | Stop reason: HOSPADM

## 2021-09-08 RX ORDER — PROCHLORPERAZINE EDISYLATE 5 MG/ML
5 INJECTION INTRAMUSCULAR; INTRAVENOUS EVERY 6 HOURS PRN
Status: DISCONTINUED | OUTPATIENT
Start: 2021-09-08 | End: 2021-09-09 | Stop reason: HOSPADM

## 2021-09-08 RX ORDER — ACETAMINOPHEN 325 MG/1
650 TABLET ORAL EVERY 6 HOURS PRN
Status: DISCONTINUED | OUTPATIENT
Start: 2021-09-08 | End: 2021-09-09 | Stop reason: HOSPADM

## 2021-09-08 RX ORDER — SODIUM CHLORIDE 0.9 % (FLUSH) 0.9 %
10 SYRINGE (ML) INJECTION
Status: DISCONTINUED | OUTPATIENT
Start: 2021-09-08 | End: 2021-09-09 | Stop reason: HOSPADM

## 2021-09-08 RX ORDER — SIMETHICONE 80 MG
1 TABLET,CHEWABLE ORAL EVERY 6 HOURS PRN
Status: DISCONTINUED | OUTPATIENT
Start: 2021-09-08 | End: 2021-09-09 | Stop reason: HOSPADM

## 2021-09-08 RX ORDER — ONDANSETRON 8 MG/1
8 TABLET, ORALLY DISINTEGRATING ORAL EVERY 8 HOURS PRN
Status: DISCONTINUED | OUTPATIENT
Start: 2021-09-08 | End: 2021-09-09 | Stop reason: HOSPADM

## 2021-09-08 RX ORDER — DIPHENHYDRAMINE HYDROCHLORIDE 50 MG/ML
25 INJECTION INTRAMUSCULAR; INTRAVENOUS EVERY 4 HOURS PRN
Status: DISCONTINUED | OUTPATIENT
Start: 2021-09-08 | End: 2021-09-09 | Stop reason: HOSPADM

## 2021-09-08 RX ORDER — HYDROCODONE BITARTRATE AND ACETAMINOPHEN 5; 325 MG/1; MG/1
1 TABLET ORAL EVERY 4 HOURS PRN
Status: DISCONTINUED | OUTPATIENT
Start: 2021-09-08 | End: 2021-09-09 | Stop reason: HOSPADM

## 2021-09-08 RX ORDER — HYDROCODONE BITARTRATE AND ACETAMINOPHEN 10; 325 MG/1; MG/1
1 TABLET ORAL EVERY 4 HOURS PRN
Status: DISCONTINUED | OUTPATIENT
Start: 2021-09-08 | End: 2021-09-09 | Stop reason: HOSPADM

## 2021-09-08 RX ORDER — DOCUSATE SODIUM 100 MG/1
200 CAPSULE, LIQUID FILLED ORAL 2 TIMES DAILY PRN
Status: DISCONTINUED | OUTPATIENT
Start: 2021-09-08 | End: 2021-09-09 | Stop reason: HOSPADM

## 2021-09-08 RX ORDER — OXYTOCIN/RINGER'S LACTATE 30/500 ML
95 PLASTIC BAG, INJECTION (ML) INTRAVENOUS ONCE
Status: DISCONTINUED | OUTPATIENT
Start: 2021-09-08 | End: 2021-09-09 | Stop reason: HOSPADM

## 2021-09-08 RX ORDER — PRENATAL WITH FERROUS FUM AND FOLIC ACID 3080; 920; 120; 400; 22; 1.84; 3; 20; 10; 1; 12; 200; 27; 25; 2 [IU]/1; [IU]/1; MG/1; [IU]/1; MG/1; MG/1; MG/1; MG/1; MG/1; MG/1; UG/1; MG/1; MG/1; MG/1; MG/1
1 TABLET ORAL DAILY
Status: DISCONTINUED | OUTPATIENT
Start: 2021-09-08 | End: 2021-09-09 | Stop reason: HOSPADM

## 2021-09-08 RX ORDER — IBUPROFEN 600 MG/1
600 TABLET ORAL EVERY 6 HOURS
Status: DISCONTINUED | OUTPATIENT
Start: 2021-09-08 | End: 2021-09-09 | Stop reason: HOSPADM

## 2021-09-08 RX ADMIN — IBUPROFEN 600 MG: 600 TABLET ORAL at 11:09

## 2021-09-08 RX ADMIN — IBUPROFEN 600 MG: 600 TABLET ORAL at 05:09

## 2021-09-08 RX ADMIN — PRENATAL VIT W/ FE FUMARATE-FA TAB 27-0.8 MG 1 TABLET: 27-0.8 TAB at 08:09

## 2021-09-08 RX ADMIN — IBUPROFEN 600 MG: 600 TABLET ORAL at 06:09

## 2021-09-09 ENCOUNTER — PATIENT MESSAGE (OUTPATIENT)
Dept: OBSTETRICS AND GYNECOLOGY | Facility: CLINIC | Age: 31
End: 2021-09-09

## 2021-09-09 VITALS
DIASTOLIC BLOOD PRESSURE: 58 MMHG | RESPIRATION RATE: 17 BRPM | WEIGHT: 182 LBS | OXYGEN SATURATION: 100 % | BODY MASS INDEX: 33.49 KG/M2 | SYSTOLIC BLOOD PRESSURE: 103 MMHG | TEMPERATURE: 98 F | HEART RATE: 78 BPM | HEIGHT: 62 IN

## 2021-09-09 DIAGNOSIS — Z30.2 ENCOUNTER FOR STERILIZATION: Primary | ICD-10-CM

## 2021-09-09 PROBLEM — O34.219 VBAC (VAGINAL BIRTH AFTER CESAREAN): Status: ACTIVE | Noted: 2021-09-09

## 2021-09-09 LAB
BASOPHILS # BLD AUTO: 0.06 K/UL (ref 0–0.2)
BASOPHILS NFR BLD: 0.5 % (ref 0–1.9)
DIFFERENTIAL METHOD: ABNORMAL
EOSINOPHIL # BLD AUTO: 0.2 K/UL (ref 0–0.5)
EOSINOPHIL NFR BLD: 1.7 % (ref 0–8)
ERYTHROCYTE [DISTWIDTH] IN BLOOD BY AUTOMATED COUNT: 14.4 % (ref 11.5–14.5)
HCT VFR BLD AUTO: 32.9 % (ref 37–48.5)
HGB BLD-MCNC: 10.6 G/DL (ref 12–16)
IMM GRANULOCYTES # BLD AUTO: 0.05 K/UL (ref 0–0.04)
IMM GRANULOCYTES NFR BLD AUTO: 0.4 % (ref 0–0.5)
LYMPHOCYTES # BLD AUTO: 2.2 K/UL (ref 1–4.8)
LYMPHOCYTES NFR BLD: 19 % (ref 18–48)
MCH RBC QN AUTO: 29.5 PG (ref 27–31)
MCHC RBC AUTO-ENTMCNC: 32.2 G/DL (ref 32–36)
MCV RBC AUTO: 92 FL (ref 82–98)
MONOCYTES # BLD AUTO: 1 K/UL (ref 0.3–1)
MONOCYTES NFR BLD: 8.5 % (ref 4–15)
NEUTROPHILS # BLD AUTO: 8.1 K/UL (ref 1.8–7.7)
NEUTROPHILS NFR BLD: 69.9 % (ref 38–73)
NRBC BLD-RTO: 0 /100 WBC
PLATELET # BLD AUTO: 269 K/UL (ref 150–450)
PMV BLD AUTO: 9.8 FL (ref 9.2–12.9)
RBC # BLD AUTO: 3.59 M/UL (ref 4–5.4)
RPR SER QL: NORMAL
WBC # BLD AUTO: 11.61 K/UL (ref 3.9–12.7)

## 2021-09-09 PROCEDURE — 90686 IIV4 VACC NO PRSV 0.5 ML IM: CPT | Performed by: OBSTETRICS & GYNECOLOGY

## 2021-09-09 PROCEDURE — 90471 IMMUNIZATION ADMIN: CPT | Performed by: OBSTETRICS & GYNECOLOGY

## 2021-09-09 PROCEDURE — 25000003 PHARM REV CODE 250: Performed by: STUDENT IN AN ORGANIZED HEALTH CARE EDUCATION/TRAINING PROGRAM

## 2021-09-09 PROCEDURE — 36415 COLL VENOUS BLD VENIPUNCTURE: CPT | Performed by: STUDENT IN AN ORGANIZED HEALTH CARE EDUCATION/TRAINING PROGRAM

## 2021-09-09 PROCEDURE — 99238 HOSP IP/OBS DSCHRG MGMT 30/<: CPT | Mod: ,,, | Performed by: OBSTETRICS & GYNECOLOGY

## 2021-09-09 PROCEDURE — 63600175 PHARM REV CODE 636 W HCPCS: Performed by: OBSTETRICS & GYNECOLOGY

## 2021-09-09 PROCEDURE — 99238 PR HOSPITAL DISCHARGE DAY,<30 MIN: ICD-10-PCS | Mod: ,,, | Performed by: OBSTETRICS & GYNECOLOGY

## 2021-09-09 PROCEDURE — 85025 COMPLETE CBC W/AUTO DIFF WBC: CPT | Performed by: STUDENT IN AN ORGANIZED HEALTH CARE EDUCATION/TRAINING PROGRAM

## 2021-09-09 RX ORDER — IBUPROFEN 600 MG/1
600 TABLET ORAL EVERY 6 HOURS
Qty: 60 TABLET | Refills: 0 | Status: ON HOLD | OUTPATIENT
Start: 2021-09-09 | End: 2021-10-25 | Stop reason: SDUPTHER

## 2021-09-09 RX ORDER — DOCUSATE SODIUM 100 MG/1
200 CAPSULE, LIQUID FILLED ORAL 2 TIMES DAILY PRN
Qty: 60 CAPSULE | Refills: 0 | Status: SHIPPED | OUTPATIENT
Start: 2021-09-09 | End: 2022-03-02

## 2021-09-09 RX ORDER — ACETAMINOPHEN 325 MG/1
650 TABLET ORAL EVERY 6 HOURS PRN
Qty: 60 TABLET | Refills: 0 | Status: SHIPPED | OUTPATIENT
Start: 2021-09-09

## 2021-09-09 RX ADMIN — INFLUENZA VIRUS VACCINE 0.5 ML: 15; 15; 15; 15 SUSPENSION INTRAMUSCULAR at 12:09

## 2021-09-09 RX ADMIN — IBUPROFEN 600 MG: 600 TABLET ORAL at 11:09

## 2021-09-09 RX ADMIN — IBUPROFEN 600 MG: 600 TABLET ORAL at 12:09

## 2021-09-09 RX ADMIN — PRENATAL VIT W/ FE FUMARATE-FA TAB 27-0.8 MG 1 TABLET: 27-0.8 TAB at 08:09

## 2021-09-09 RX ADMIN — DOCUSATE SODIUM 200 MG: 100 CAPSULE, LIQUID FILLED ORAL at 08:09

## 2021-09-09 RX ADMIN — IBUPROFEN 600 MG: 600 TABLET ORAL at 05:09

## 2021-09-11 LAB — BACTERIA SPEC AEROBE CULT: NORMAL

## 2021-10-21 ENCOUNTER — POSTPARTUM VISIT (OUTPATIENT)
Dept: OBSTETRICS AND GYNECOLOGY | Facility: CLINIC | Age: 31
End: 2021-10-21
Payer: MEDICAID

## 2021-10-21 ENCOUNTER — HOSPITAL ENCOUNTER (OUTPATIENT)
Dept: PREADMISSION TESTING | Facility: OTHER | Age: 31
Discharge: HOME | End: 2021-10-21
Attending: OBSTETRICS & GYNECOLOGY
Payer: MEDICAID

## 2021-10-21 ENCOUNTER — ANESTHESIA EVENT (OUTPATIENT)
Dept: SURGERY | Facility: OTHER | Age: 31
End: 2021-10-21
Payer: MEDICAID

## 2021-10-21 VITALS
OXYGEN SATURATION: 99 % | WEIGHT: 166 LBS | DIASTOLIC BLOOD PRESSURE: 71 MMHG | TEMPERATURE: 97 F | SYSTOLIC BLOOD PRESSURE: 119 MMHG | HEART RATE: 99 BPM | BODY MASS INDEX: 30.55 KG/M2 | HEIGHT: 62 IN

## 2021-10-21 VITALS
WEIGHT: 154.75 LBS | BODY MASS INDEX: 28.48 KG/M2 | SYSTOLIC BLOOD PRESSURE: 114 MMHG | DIASTOLIC BLOOD PRESSURE: 62 MMHG | HEIGHT: 62 IN

## 2021-10-21 DIAGNOSIS — Z30.2 ENCOUNTER FOR STERILIZATION: ICD-10-CM

## 2021-10-21 PROCEDURE — 59430 PR CARE AFTER DELIVERY ONLY: ICD-10-PCS | Mod: ,,, | Performed by: OBSTETRICS & GYNECOLOGY

## 2021-10-21 PROCEDURE — 99213 OFFICE O/P EST LOW 20 MIN: CPT | Mod: PBBFAC | Performed by: OBSTETRICS & GYNECOLOGY

## 2021-10-21 PROCEDURE — 99999 PR PBB SHADOW E&M-EST. PATIENT-LVL III: CPT | Mod: PBBFAC,,, | Performed by: OBSTETRICS & GYNECOLOGY

## 2021-10-21 PROCEDURE — 99999 PR PBB SHADOW E&M-EST. PATIENT-LVL III: ICD-10-PCS | Mod: PBBFAC,,, | Performed by: OBSTETRICS & GYNECOLOGY

## 2021-10-21 RX ORDER — ACETAMINOPHEN 500 MG
1000 TABLET ORAL
Status: CANCELLED | OUTPATIENT
Start: 2021-10-21 | End: 2021-10-21

## 2021-10-21 RX ORDER — LIDOCAINE HYDROCHLORIDE 10 MG/ML
0.5 INJECTION, SOLUTION EPIDURAL; INFILTRATION; INTRACAUDAL; PERINEURAL ONCE
Status: CANCELLED | OUTPATIENT
Start: 2021-10-21 | End: 2021-10-21

## 2021-10-21 RX ORDER — SODIUM CHLORIDE, SODIUM LACTATE, POTASSIUM CHLORIDE, CALCIUM CHLORIDE 600; 310; 30; 20 MG/100ML; MG/100ML; MG/100ML; MG/100ML
INJECTION, SOLUTION INTRAVENOUS CONTINUOUS
Status: CANCELLED | OUTPATIENT
Start: 2021-10-21

## 2021-10-22 ENCOUNTER — TELEPHONE (OUTPATIENT)
Dept: OBSTETRICS AND GYNECOLOGY | Facility: CLINIC | Age: 31
End: 2021-10-22

## 2021-10-25 ENCOUNTER — NURSE TRIAGE (OUTPATIENT)
Dept: ADMINISTRATIVE | Facility: CLINIC | Age: 31
End: 2021-10-25
Payer: MEDICAID

## 2021-10-25 ENCOUNTER — TELEPHONE (OUTPATIENT)
Dept: OBSTETRICS AND GYNECOLOGY | Facility: HOSPITAL | Age: 31
End: 2021-10-25
Payer: MEDICAID

## 2021-10-25 ENCOUNTER — HOSPITAL ENCOUNTER (OUTPATIENT)
Facility: OTHER | Age: 31
Discharge: HOME | End: 2021-10-25
Attending: OBSTETRICS & GYNECOLOGY | Admitting: OBSTETRICS & GYNECOLOGY
Payer: MEDICAID

## 2021-10-25 ENCOUNTER — TELEPHONE (OUTPATIENT)
Dept: OBSTETRICS AND GYNECOLOGY | Facility: CLINIC | Age: 31
End: 2021-10-25
Payer: MEDICAID

## 2021-10-25 ENCOUNTER — ANESTHESIA (OUTPATIENT)
Dept: SURGERY | Facility: OTHER | Age: 31
End: 2021-10-25
Payer: MEDICAID

## 2021-10-25 VITALS
OXYGEN SATURATION: 99 % | TEMPERATURE: 98 F | SYSTOLIC BLOOD PRESSURE: 122 MMHG | WEIGHT: 154.75 LBS | DIASTOLIC BLOOD PRESSURE: 79 MMHG | HEIGHT: 62 IN | RESPIRATION RATE: 16 BRPM | HEART RATE: 80 BPM | BODY MASS INDEX: 28.48 KG/M2

## 2021-10-25 DIAGNOSIS — Z30.2 ENCOUNTER FOR STERILIZATION: ICD-10-CM

## 2021-10-25 DIAGNOSIS — Z98.51 S/P TUBAL LIGATION: Primary | ICD-10-CM

## 2021-10-25 LAB
B-HCG UR QL: NEGATIVE
CTP QC/QA: YES

## 2021-10-25 PROCEDURE — 58661 PR LAP,RMV  ADNEXAL STRUCTURE: ICD-10-PCS | Mod: 50,,, | Performed by: OBSTETRICS & GYNECOLOGY

## 2021-10-25 PROCEDURE — 88302 PR  SURG PATH,LEVEL II: ICD-10-PCS | Mod: 26,,, | Performed by: PATHOLOGY

## 2021-10-25 PROCEDURE — 63600175 PHARM REV CODE 636 W HCPCS: Performed by: REGISTERED NURSE

## 2021-10-25 PROCEDURE — 27201423 OPTIME MED/SURG SUP & DEVICES STERILE SUPPLY: Performed by: OBSTETRICS & GYNECOLOGY

## 2021-10-25 PROCEDURE — 25000003 PHARM REV CODE 250: Performed by: ANESTHESIOLOGY

## 2021-10-25 PROCEDURE — 71000016 HC POSTOP RECOV ADDL HR: Performed by: OBSTETRICS & GYNECOLOGY

## 2021-10-25 PROCEDURE — 36000709 HC OR TIME LEV III EA ADD 15 MIN: Performed by: OBSTETRICS & GYNECOLOGY

## 2021-10-25 PROCEDURE — 37000009 HC ANESTHESIA EA ADD 15 MINS: Performed by: OBSTETRICS & GYNECOLOGY

## 2021-10-25 PROCEDURE — 63600175 PHARM REV CODE 636 W HCPCS: Performed by: ANESTHESIOLOGY

## 2021-10-25 PROCEDURE — 88302 TISSUE EXAM BY PATHOLOGIST: CPT | Mod: 59 | Performed by: PATHOLOGY

## 2021-10-25 PROCEDURE — 25000003 PHARM REV CODE 250: Performed by: REGISTERED NURSE

## 2021-10-25 PROCEDURE — 71000039 HC RECOVERY, EACH ADD'L HOUR: Performed by: OBSTETRICS & GYNECOLOGY

## 2021-10-25 PROCEDURE — 36000708 HC OR TIME LEV III 1ST 15 MIN: Performed by: OBSTETRICS & GYNECOLOGY

## 2021-10-25 PROCEDURE — 58661 LAPAROSCOPY REMOVE ADNEXA: CPT | Mod: 50,,, | Performed by: OBSTETRICS & GYNECOLOGY

## 2021-10-25 PROCEDURE — 00840 ANES IPER PX LOWER ABD NOS: CPT | Performed by: OBSTETRICS & GYNECOLOGY

## 2021-10-25 PROCEDURE — 71000033 HC RECOVERY, INTIAL HOUR: Performed by: OBSTETRICS & GYNECOLOGY

## 2021-10-25 PROCEDURE — 37000008 HC ANESTHESIA 1ST 15 MINUTES: Performed by: OBSTETRICS & GYNECOLOGY

## 2021-10-25 PROCEDURE — 88302 TISSUE EXAM BY PATHOLOGIST: CPT | Mod: 26,,, | Performed by: PATHOLOGY

## 2021-10-25 PROCEDURE — 71000015 HC POSTOP RECOV 1ST HR: Performed by: OBSTETRICS & GYNECOLOGY

## 2021-10-25 PROCEDURE — 81025 URINE PREGNANCY TEST: CPT | Performed by: ANESTHESIOLOGY

## 2021-10-25 RX ORDER — HYDROMORPHONE HYDROCHLORIDE 2 MG/ML
0.4 INJECTION, SOLUTION INTRAMUSCULAR; INTRAVENOUS; SUBCUTANEOUS EVERY 5 MIN PRN
Status: DISCONTINUED | OUTPATIENT
Start: 2021-10-25 | End: 2021-10-25 | Stop reason: HOSPADM

## 2021-10-25 RX ORDER — LIDOCAINE HCL/PF 100 MG/5ML
SYRINGE (ML) INTRAVENOUS
Status: DISCONTINUED | OUTPATIENT
Start: 2021-10-25 | End: 2021-10-25

## 2021-10-25 RX ORDER — FENTANYL CITRATE 50 UG/ML
100 INJECTION, SOLUTION INTRAMUSCULAR; INTRAVENOUS EVERY 5 MIN PRN
Status: CANCELLED | OUTPATIENT
Start: 2021-10-25

## 2021-10-25 RX ORDER — HYDROCODONE BITARTRATE AND ACETAMINOPHEN 5; 325 MG/1; MG/1
1 TABLET ORAL EVERY 6 HOURS PRN
Qty: 5 TABLET | Refills: 0 | Status: SHIPPED | OUTPATIENT
Start: 2021-10-25 | End: 2022-03-02

## 2021-10-25 RX ORDER — SODIUM CHLORIDE 0.9 % (FLUSH) 0.9 %
3 SYRINGE (ML) INJECTION
Status: DISCONTINUED | OUTPATIENT
Start: 2021-10-25 | End: 2021-10-25 | Stop reason: HOSPADM

## 2021-10-25 RX ORDER — ONDANSETRON 2 MG/ML
INJECTION INTRAMUSCULAR; INTRAVENOUS
Status: DISCONTINUED | OUTPATIENT
Start: 2021-10-25 | End: 2021-10-25

## 2021-10-25 RX ORDER — KETOROLAC TROMETHAMINE 30 MG/ML
INJECTION, SOLUTION INTRAMUSCULAR; INTRAVENOUS
Status: DISCONTINUED | OUTPATIENT
Start: 2021-10-25 | End: 2021-10-25

## 2021-10-25 RX ORDER — IBUPROFEN 600 MG/1
600 TABLET ORAL 3 TIMES DAILY
Qty: 30 TABLET | Refills: 1 | Status: SHIPPED | OUTPATIENT
Start: 2021-10-25 | End: 2022-03-02

## 2021-10-25 RX ORDER — DEXAMETHASONE SODIUM PHOSPHATE 4 MG/ML
INJECTION, SOLUTION INTRA-ARTICULAR; INTRALESIONAL; INTRAMUSCULAR; INTRAVENOUS; SOFT TISSUE
Status: DISCONTINUED | OUTPATIENT
Start: 2021-10-25 | End: 2021-10-25

## 2021-10-25 RX ORDER — LIDOCAINE HYDROCHLORIDE 10 MG/ML
0.5 INJECTION, SOLUTION EPIDURAL; INFILTRATION; INTRACAUDAL; PERINEURAL ONCE
Status: DISCONTINUED | OUTPATIENT
Start: 2021-10-25 | End: 2021-10-25 | Stop reason: HOSPADM

## 2021-10-25 RX ORDER — PROPOFOL 10 MG/ML
VIAL (ML) INTRAVENOUS
Status: DISCONTINUED | OUTPATIENT
Start: 2021-10-25 | End: 2021-10-25

## 2021-10-25 RX ORDER — SODIUM CHLORIDE, SODIUM LACTATE, POTASSIUM CHLORIDE, CALCIUM CHLORIDE 600; 310; 30; 20 MG/100ML; MG/100ML; MG/100ML; MG/100ML
INJECTION, SOLUTION INTRAVENOUS CONTINUOUS
Status: DISCONTINUED | OUTPATIENT
Start: 2021-10-25 | End: 2021-10-25 | Stop reason: HOSPADM

## 2021-10-25 RX ORDER — DIPHENHYDRAMINE HYDROCHLORIDE 50 MG/ML
25 INJECTION INTRAMUSCULAR; INTRAVENOUS EVERY 6 HOURS PRN
Status: DISCONTINUED | OUTPATIENT
Start: 2021-10-25 | End: 2021-10-25 | Stop reason: HOSPADM

## 2021-10-25 RX ORDER — MIDAZOLAM HYDROCHLORIDE 1 MG/ML
5 INJECTION INTRAMUSCULAR; INTRAVENOUS ONCE AS NEEDED
Status: CANCELLED | OUTPATIENT
Start: 2021-10-25 | End: 2033-03-22

## 2021-10-25 RX ORDER — PROCHLORPERAZINE EDISYLATE 5 MG/ML
5 INJECTION INTRAMUSCULAR; INTRAVENOUS EVERY 30 MIN PRN
Status: DISCONTINUED | OUTPATIENT
Start: 2021-10-25 | End: 2021-10-25 | Stop reason: HOSPADM

## 2021-10-25 RX ORDER — OXYCODONE HYDROCHLORIDE 5 MG/1
5 TABLET ORAL
Status: DISCONTINUED | OUTPATIENT
Start: 2021-10-25 | End: 2021-10-25 | Stop reason: HOSPADM

## 2021-10-25 RX ORDER — ACETAMINOPHEN 500 MG
1000 TABLET ORAL
Status: COMPLETED | OUTPATIENT
Start: 2021-10-25 | End: 2021-10-25

## 2021-10-25 RX ORDER — SODIUM CHLORIDE 9 MG/ML
INJECTION, SOLUTION INTRAVENOUS CONTINUOUS
Status: DISCONTINUED | OUTPATIENT
Start: 2021-10-25 | End: 2021-10-25 | Stop reason: HOSPADM

## 2021-10-25 RX ORDER — MEPERIDINE HYDROCHLORIDE 25 MG/ML
12.5 INJECTION INTRAMUSCULAR; INTRAVENOUS; SUBCUTANEOUS ONCE AS NEEDED
Status: DISCONTINUED | OUTPATIENT
Start: 2021-10-25 | End: 2021-10-25 | Stop reason: HOSPADM

## 2021-10-25 RX ORDER — FENTANYL CITRATE 50 UG/ML
INJECTION, SOLUTION INTRAMUSCULAR; INTRAVENOUS
Status: DISCONTINUED | OUTPATIENT
Start: 2021-10-25 | End: 2021-10-25

## 2021-10-25 RX ORDER — ROCURONIUM BROMIDE 10 MG/ML
INJECTION, SOLUTION INTRAVENOUS
Status: DISCONTINUED | OUTPATIENT
Start: 2021-10-25 | End: 2021-10-25

## 2021-10-25 RX ADMIN — ONDANSETRON HYDROCHLORIDE 4 MG: 2 INJECTION INTRAMUSCULAR; INTRAVENOUS at 07:10

## 2021-10-25 RX ADMIN — PROPOFOL 180 MG: 10 INJECTION, EMULSION INTRAVENOUS at 07:10

## 2021-10-25 RX ADMIN — FENTANYL CITRATE 50 MCG: 50 INJECTION, SOLUTION INTRAMUSCULAR; INTRAVENOUS at 08:10

## 2021-10-25 RX ADMIN — LIDOCAINE HYDROCHLORIDE 50 MG: 20 INJECTION, SOLUTION INTRAVENOUS at 07:10

## 2021-10-25 RX ADMIN — ACETAMINOPHEN 1000 MG: 500 TABLET, FILM COATED ORAL at 06:10

## 2021-10-25 RX ADMIN — SODIUM CHLORIDE, SODIUM LACTATE, POTASSIUM CHLORIDE, AND CALCIUM CHLORIDE: 600; 310; 30; 20 INJECTION, SOLUTION INTRAVENOUS at 07:10

## 2021-10-25 RX ADMIN — KETOROLAC TROMETHAMINE 30 MG: 30 INJECTION, SOLUTION INTRAMUSCULAR; INTRAVENOUS at 08:10

## 2021-10-25 RX ADMIN — FENTANYL CITRATE 50 MCG: 50 INJECTION, SOLUTION INTRAMUSCULAR; INTRAVENOUS at 07:10

## 2021-10-25 RX ADMIN — SUGAMMADEX 150 MG: 100 INJECTION, SOLUTION INTRAVENOUS at 08:10

## 2021-10-25 RX ADMIN — DEXAMETHASONE SODIUM PHOSPHATE 4 MG: 4 INJECTION, SOLUTION INTRAMUSCULAR; INTRAVENOUS at 07:10

## 2021-10-25 RX ADMIN — ROCURONIUM BROMIDE 40 MG: 10 INJECTION, SOLUTION INTRAVENOUS at 07:10

## 2021-10-25 RX ADMIN — HYDROMORPHONE HYDROCHLORIDE 0.4 MG: 2 INJECTION INTRAMUSCULAR; INTRAVENOUS; SUBCUTANEOUS at 08:10

## 2021-10-25 RX ADMIN — SODIUM CHLORIDE, SODIUM LACTATE, POTASSIUM CHLORIDE, AND CALCIUM CHLORIDE: 600; 310; 30; 20 INJECTION, SOLUTION INTRAVENOUS at 08:10

## 2021-10-25 RX ADMIN — CARBOXYMETHYLCELLULOSE SODIUM 1 DROP: 2.5 SOLUTION/ DROPS OPHTHALMIC at 07:10

## 2021-10-25 RX ADMIN — PROPOFOL 50 MG: 10 INJECTION, EMULSION INTRAVENOUS at 08:10

## 2021-10-25 RX ADMIN — OXYCODONE 5 MG: 5 TABLET ORAL at 09:10

## 2021-10-29 ENCOUNTER — PATIENT MESSAGE (OUTPATIENT)
Dept: OBSTETRICS AND GYNECOLOGY | Facility: CLINIC | Age: 31
End: 2021-10-29
Payer: MEDICAID

## 2021-11-01 LAB
FINAL PATHOLOGIC DIAGNOSIS: NORMAL
Lab: NORMAL

## 2021-12-21 ENCOUNTER — PATIENT MESSAGE (OUTPATIENT)
Dept: NEUROLOGY | Facility: CLINIC | Age: 31
End: 2021-12-21
Payer: MEDICAID

## 2022-02-17 ENCOUNTER — PATIENT MESSAGE (OUTPATIENT)
Dept: FAMILY MEDICINE | Facility: CLINIC | Age: 32
End: 2022-02-17
Payer: MEDICAID

## 2022-02-23 ENCOUNTER — PATIENT MESSAGE (OUTPATIENT)
Dept: OBSTETRICS AND GYNECOLOGY | Facility: CLINIC | Age: 32
End: 2022-02-23
Payer: MEDICAID

## 2022-02-28 ENCOUNTER — PATIENT MESSAGE (OUTPATIENT)
Dept: PSYCHIATRY | Facility: CLINIC | Age: 32
End: 2022-02-28
Payer: MEDICAID

## 2022-03-02 ENCOUNTER — OFFICE VISIT (OUTPATIENT)
Dept: FAMILY MEDICINE | Facility: CLINIC | Age: 32
End: 2022-03-02
Payer: MEDICAID

## 2022-03-02 VITALS
TEMPERATURE: 98 F | HEIGHT: 62 IN | OXYGEN SATURATION: 98 % | HEART RATE: 81 BPM | BODY MASS INDEX: 29.62 KG/M2 | WEIGHT: 160.94 LBS

## 2022-03-02 DIAGNOSIS — Z00.00 ANNUAL PHYSICAL EXAM: Primary | ICD-10-CM

## 2022-03-02 PROCEDURE — 1159F MED LIST DOCD IN RCRD: CPT | Mod: CPTII,,, | Performed by: FAMILY MEDICINE

## 2022-03-02 PROCEDURE — 99395 PR PREVENTIVE VISIT,EST,18-39: ICD-10-PCS | Mod: S$PBB,,, | Performed by: FAMILY MEDICINE

## 2022-03-02 PROCEDURE — 99999 PR PBB SHADOW E&M-EST. PATIENT-LVL III: ICD-10-PCS | Mod: PBBFAC,,, | Performed by: FAMILY MEDICINE

## 2022-03-02 PROCEDURE — 1160F RVW MEDS BY RX/DR IN RCRD: CPT | Mod: CPTII,,, | Performed by: FAMILY MEDICINE

## 2022-03-02 PROCEDURE — 3008F PR BODY MASS INDEX (BMI) DOCUMENTED: ICD-10-PCS | Mod: CPTII,,, | Performed by: FAMILY MEDICINE

## 2022-03-02 PROCEDURE — 99999 PR PBB SHADOW E&M-EST. PATIENT-LVL III: CPT | Mod: PBBFAC,,, | Performed by: FAMILY MEDICINE

## 2022-03-02 PROCEDURE — 99213 OFFICE O/P EST LOW 20 MIN: CPT | Mod: PBBFAC,PO | Performed by: FAMILY MEDICINE

## 2022-03-02 PROCEDURE — 3008F BODY MASS INDEX DOCD: CPT | Mod: CPTII,,, | Performed by: FAMILY MEDICINE

## 2022-03-02 PROCEDURE — 99395 PREV VISIT EST AGE 18-39: CPT | Mod: S$PBB,,, | Performed by: FAMILY MEDICINE

## 2022-03-02 PROCEDURE — 1159F PR MEDICATION LIST DOCUMENTED IN MEDICAL RECORD: ICD-10-PCS | Mod: CPTII,,, | Performed by: FAMILY MEDICINE

## 2022-03-02 PROCEDURE — 1160F PR REVIEW ALL MEDS BY PRESCRIBER/CLIN PHARMACIST DOCUMENTED: ICD-10-PCS | Mod: CPTII,,, | Performed by: FAMILY MEDICINE

## 2022-03-02 NOTE — PROGRESS NOTES
Subjective:       Patient ID: Alejandra Garcia is a 31 y.o. female.    Chief Complaint: No chief complaint on file.    31 year-old female presents for an annual exam    She would like me to look at her belly button. She states she felt like her belly button is lopsided. She states she had a tubal ligation and wanted to make sure she didn't have hernia.    She states her menstrual cycles have been abnormal. She states the month after it was normal. She states in January it was very little. She states in February she had cramps and had some pain around her pelvic region. She had pain with walking or bending. She has a pulling sensation with this. She has no pain at rest. She states she had this with that menstrual cycle, but it resolved.     Past Medical History:   Diagnosis Date    Menarche     Age of onset 12    Multiple sclerosis     S/P tubal ligation 10/25/2021      Past Surgical History:   Procedure Laterality Date     SECTION, LOW TRANSVERSE      Breech in labor    CHOLECYSTECTOMY      LAPAROSCOPIC SALPINGECTOMY Bilateral 10/25/2021    Procedure: SALPINGECTOMY, LAPAROSCOPIC;  Surgeon: Mima Bains MD;  Location: [facility];  Service: OB/GYN;  Laterality: Bilateral;    post partem      6 weeks     VAGINAL BIRTH AFTER  SECTION  2017     Family History   Problem Relation Age of Onset    Lupus Mother     Hypertension Mother     Diabetes Father     Hypertension Maternal Grandmother     Asthma Maternal Grandmother     Breast cancer Neg Hx     Colon cancer Neg Hx     Ovarian cancer Neg Hx      Social History     Socioeconomic History    Marital status:    Tobacco Use    Smoking status: Never Smoker    Smokeless tobacco: Never Used   Substance and Sexual Activity    Alcohol use: No    Drug use: No    Sexual activity: Yes     Partners: Male     Birth control/protection: None, Condom     Comment:  since        Review of Systems  "  Constitutional: Negative for fatigue and fever.   Respiratory: Negative for chest tightness and shortness of breath.    Cardiovascular: Negative for chest pain and palpitations.   Gastrointestinal: Positive for diarrhea. Negative for abdominal pain, blood in stool, constipation, nausea and vomiting.   Genitourinary: Negative for dysuria and hematuria.   Neurological: Negative for dizziness, syncope and light-headedness.         Objective:       Vitals:    03/02/22 0811   Pulse: 81   Temp: 98.1 °F (36.7 °C)   TempSrc: Oral   SpO2: 98%   Weight: 73 kg (160 lb 15 oz)   Height: 5' 2" (1.575 m)       Physical Exam  Constitutional:       General: She is not in acute distress.     Appearance: Normal appearance. She is well-developed. She is not diaphoretic.   HENT:      Head: Normocephalic.      Right Ear: External ear normal.      Left Ear: External ear normal.      Mouth/Throat:      Pharynx: No oropharyngeal exudate.   Eyes:      Conjunctiva/sclera: Conjunctivae normal.   Neck:      Thyroid: No thyromegaly.   Cardiovascular:      Rate and Rhythm: Normal rate and regular rhythm.      Heart sounds: Normal heart sounds. No murmur heard.    No friction rub. No gallop.   Pulmonary:      Effort: Pulmonary effort is normal. No respiratory distress.      Breath sounds: Normal breath sounds. No wheezing or rales.   Abdominal:      General: Bowel sounds are normal. There is no distension.      Palpations: Abdomen is soft. There is no mass.      Tenderness: There is no abdominal tenderness. There is no guarding or rebound.   Musculoskeletal:      Cervical back: Normal range of motion and neck supple.   Lymphadenopathy:      Cervical: No cervical adenopathy.   Skin:     Findings: No rash.   Neurological:      Mental Status: She is alert.         Assessment:       Problem List Items Addressed This Visit    None     Visit Diagnoses     Annual physical exam    -  Primary    Relevant Orders    Comprehensive Metabolic Panel        "   Plan:       Diagnoses and all orders for this visit:    Annual physical exam  She has regular labs done,w hich were normal

## 2022-06-24 ENCOUNTER — PATIENT MESSAGE (OUTPATIENT)
Dept: PSYCHIATRY | Facility: CLINIC | Age: 32
End: 2022-06-24
Payer: MEDICAID

## 2022-09-14 ENCOUNTER — TELEPHONE (OUTPATIENT)
Dept: OBSTETRICS AND GYNECOLOGY | Facility: CLINIC | Age: 32
End: 2022-09-14
Payer: MEDICAID

## 2022-09-14 NOTE — TELEPHONE ENCOUNTER
----- Message from Abbi Chacon sent at 9/14/2022  1:10 PM CDT -----  Regarding: Appointment Access              Name of Who is Calling: Alejandra Garcia    Who Left The Message: Alejandra Garcia      What is the request in detail:    Patient called requesting to schedule her annual [facility] visit with Dr. MISTY Bains ONLY.  I did attempt to schedule per patient request but was unsuccessful.   Please further advise.   Thank you!      Reply by [facility]: YES       Patient's Preferred Call Back : [phone]

## 2022-11-07 ENCOUNTER — OFFICE VISIT (OUTPATIENT)
Dept: OBSTETRICS AND GYNECOLOGY | Facility: CLINIC | Age: 32
End: 2022-11-07
Payer: MEDICAID

## 2022-11-07 VITALS
BODY MASS INDEX: 31.53 KG/M2 | DIASTOLIC BLOOD PRESSURE: 89 MMHG | SYSTOLIC BLOOD PRESSURE: 128 MMHG | WEIGHT: 172.38 LBS

## 2022-11-07 DIAGNOSIS — Z01.419 ENCOUNTER FOR GYNECOLOGICAL EXAMINATION: Primary | ICD-10-CM

## 2022-11-07 DIAGNOSIS — Z12.4 PAP SMEAR FOR CERVICAL CANCER SCREENING: ICD-10-CM

## 2022-11-07 DIAGNOSIS — N89.8 VAGINAL DISCHARGE: ICD-10-CM

## 2022-11-07 DIAGNOSIS — Z11.51 ENCOUNTER FOR SCREENING FOR HUMAN PAPILLOMAVIRUS (HPV): ICD-10-CM

## 2022-11-07 DIAGNOSIS — N92.1 MENORRHAGIA WITH IRREGULAR CYCLE: ICD-10-CM

## 2022-11-07 PROCEDURE — 88141 PR  CYTOPATH CERV/VAG INTERPRET: ICD-10-PCS | Mod: ,,, | Performed by: PATHOLOGY

## 2022-11-07 PROCEDURE — 3008F PR BODY MASS INDEX (BMI) DOCUMENTED: ICD-10-PCS | Mod: CPTII,,, | Performed by: OBSTETRICS & GYNECOLOGY

## 2022-11-07 PROCEDURE — 99395 PR PREVENTIVE VISIT,EST,18-39: ICD-10-PCS | Mod: S$PBB,,, | Performed by: OBSTETRICS & GYNECOLOGY

## 2022-11-07 PROCEDURE — 99395 PREV VISIT EST AGE 18-39: CPT | Mod: S$PBB,,, | Performed by: OBSTETRICS & GYNECOLOGY

## 2022-11-07 PROCEDURE — 99999 PR PBB SHADOW E&M-EST. PATIENT-LVL III: ICD-10-PCS | Mod: PBBFAC,,, | Performed by: OBSTETRICS & GYNECOLOGY

## 2022-11-07 PROCEDURE — 99213 OFFICE O/P EST LOW 20 MIN: CPT | Mod: PBBFAC,PN | Performed by: OBSTETRICS & GYNECOLOGY

## 2022-11-07 PROCEDURE — 3079F DIAST BP 80-89 MM HG: CPT | Mod: CPTII,,, | Performed by: OBSTETRICS & GYNECOLOGY

## 2022-11-07 PROCEDURE — 87624 HPV HI-RISK TYP POOLED RSLT: CPT | Performed by: OBSTETRICS & GYNECOLOGY

## 2022-11-07 PROCEDURE — 81514 NFCT DS BV&VAGINITIS DNA ALG: CPT | Performed by: OBSTETRICS & GYNECOLOGY

## 2022-11-07 PROCEDURE — 99999 PR PBB SHADOW E&M-EST. PATIENT-LVL III: CPT | Mod: PBBFAC,,, | Performed by: OBSTETRICS & GYNECOLOGY

## 2022-11-07 PROCEDURE — 3079F PR MOST RECENT DIASTOLIC BLOOD PRESSURE 80-89 MM HG: ICD-10-PCS | Mod: CPTII,,, | Performed by: OBSTETRICS & GYNECOLOGY

## 2022-11-07 PROCEDURE — 88175 CYTOPATH C/V AUTO FLUID REDO: CPT | Performed by: PATHOLOGY

## 2022-11-07 PROCEDURE — 1159F PR MEDICATION LIST DOCUMENTED IN MEDICAL RECORD: ICD-10-PCS | Mod: CPTII,,, | Performed by: OBSTETRICS & GYNECOLOGY

## 2022-11-07 PROCEDURE — 3008F BODY MASS INDEX DOCD: CPT | Mod: CPTII,,, | Performed by: OBSTETRICS & GYNECOLOGY

## 2022-11-07 PROCEDURE — 3074F SYST BP LT 130 MM HG: CPT | Mod: CPTII,,, | Performed by: OBSTETRICS & GYNECOLOGY

## 2022-11-07 PROCEDURE — 3074F PR MOST RECENT SYSTOLIC BLOOD PRESSURE < 130 MM HG: ICD-10-PCS | Mod: CPTII,,, | Performed by: OBSTETRICS & GYNECOLOGY

## 2022-11-07 PROCEDURE — 88141 CYTOPATH C/V INTERPRET: CPT | Mod: ,,, | Performed by: PATHOLOGY

## 2022-11-07 PROCEDURE — 1159F MED LIST DOCD IN RCRD: CPT | Mod: CPTII,,, | Performed by: OBSTETRICS & GYNECOLOGY

## 2022-11-07 NOTE — PROGRESS NOTES
HPI: Pt is a 32 y.o.  female who presents for routine annual exam. She uses Laparoscopic salpingectomy for contraception. She does not want STD screening. Does report increased vaginal discharge.     Hx C/section x 1 (breech),  x 2    Also reports since doing Lap salpingectomy in 10/2021 periods have been somewhat irregular. Some months she will skip a cycle and then some months she will have a period 2 weeks after stopping period prior. Bleeding is also lasting longer 10-15 days some cycles. Prior to her last child she reports periods were normal.     Has MS and is followed by [facility].       ROS:  GENERAL: Feeling well overall. Denies fever or chills.   SKIN: Denies rash or lesions.   HEAD: Denies head injury or headache.   NODES: Denies enlarged lymph nodes.   CHEST: Denies chest pain or shortness of breath.   CARDIOVASCULAR: Denies palpitations or left sided chest pain.   ABDOMEN: No abdominal pain, constipation, diarrhea, nausea or vomiting   URINARY: No dysuria, hematuria, or burning on urination.  REPRODUCTIVE: See HPI.   BREASTS: Denies pain, lumps, or nipple discharge.   HEMATOLOGIC: No easy bruisability or excessive bleeding.   MUSCULOSKELETAL: Denies joint pain or swelling.   NEUROLOGIC: Denies syncope or weakness.   PSYCHIATRIC: Denies acute depression or anxiety     PE:   APPEARANCE: Well nourished, well developed, friendly White female in no acute distress.  NODES: no inguinal lymphadenopathy  BREASTS: Symmetrical, no skin changes or visible lesions. No palpable masses, nipple discharge or adenopathy bilaterally.  ABDOMEN: Soft. No tenderness or masses. No distention.   VULVA: No lesions. Normal external female genitalia.  URETHRAL MEATUS: Normal size and location, no lesions, no prolapse.  URETHRA: No masses, tenderness, or prolapse.  VAGINA: Moist. No lesions or lacerations noted. No abnormal discharge present. (+) yellowish discharge with mild fishy odor.   CERVIX: No lesions or discharge.  No cervical motion tenderness.   UTERUS: Normal size, regular shape, mobile, non-tender.  ADNEXA: No tenderness. No fullness or masses palpated in the adnexal regions.   ANUS PERINEUM: Normal.      Diagnosis:  1. Encounter for gynecological examination    2. Pap smear for cervical cancer screening    3. Encounter for screening for human papillomavirus (HPV)    4. Vaginal discharge    5. Menorrhagia with irregular cycle        Plan:     Orders Placed This Encounter    Vaginosis Screen by DNA Probe    HPV High Risk Genotypes, PCR    US Pelvis Comp with Transvag NON-OB (xpd    Liquid-Based Pap Smear, Screening     - will get Affirm to rule out BV and treat as needed.   - pelvic u/s to assess for intracavitary lesion (polyp, fibroid). If normal, will plan for OCP taper x 2-3 months. If no improvement after stopping OCP taper will consider endometrial ablation when I return from maternity leave.   - if u/s shows intracavitary lesion will plan for hysteroscopy prior to me going out on maternity leave.     Patient was counseled today on the new ACS guidelines for cervical cytology screening as well as the current recommendations for breast cancer screening. She was counseled to follow up with her PCP for other routine health maintenance.     Follow-up with me in 1 year for routine exam; pap/HPV in 3 years.

## 2022-11-09 LAB
BACTERIAL VAGINOSIS DNA: NEGATIVE
CANDIDA GLABRATA DNA: NEGATIVE
CANDIDA KRUSEI DNA: NEGATIVE
CANDIDA RRNA VAG QL PROBE: NEGATIVE
T VAGINALIS RRNA GENITAL QL PROBE: NEGATIVE

## 2022-11-11 LAB
HPV HR 12 DNA SPEC QL NAA+PROBE: NEGATIVE
HPV16 AG SPEC QL: NEGATIVE
HPV18 DNA SPEC QL NAA+PROBE: NEGATIVE

## 2022-11-14 LAB
FINAL PATHOLOGIC DIAGNOSIS: NORMAL
Lab: NORMAL

## 2022-11-21 ENCOUNTER — HOSPITAL ENCOUNTER (OUTPATIENT)
Dept: RADIOLOGY | Facility: HOSPITAL | Age: 32
Discharge: HOME | End: 2022-11-21
Attending: OBSTETRICS & GYNECOLOGY
Payer: MEDICAID

## 2022-11-21 DIAGNOSIS — N92.1 MENORRHAGIA WITH IRREGULAR CYCLE: ICD-10-CM

## 2022-11-21 PROCEDURE — 76830 TRANSVAGINAL US NON-OB: CPT | Mod: 26,,, | Performed by: RADIOLOGY

## 2022-11-21 PROCEDURE — 76830 TRANSVAGINAL US NON-OB: CPT | Mod: TC

## 2022-11-21 PROCEDURE — 76856 US EXAM PELVIC COMPLETE: CPT | Mod: 26,,, | Performed by: RADIOLOGY

## 2022-11-21 PROCEDURE — 76830 US PELVIS COMP WITH TRANSVAG NON-OB (XPD): ICD-10-PCS | Mod: 26,,, | Performed by: RADIOLOGY

## 2022-11-21 PROCEDURE — 76856 US PELVIS COMP WITH TRANSVAG NON-OB (XPD): ICD-10-PCS | Mod: 26,,, | Performed by: RADIOLOGY

## 2022-12-01 ENCOUNTER — PATIENT MESSAGE (OUTPATIENT)
Dept: PSYCHIATRY | Facility: CLINIC | Age: 32
End: 2022-12-01
Payer: MEDICAID

## 2023-02-20 ENCOUNTER — PATIENT MESSAGE (OUTPATIENT)
Dept: PSYCHIATRY | Facility: CLINIC | Age: 33
End: 2023-02-20
Payer: MEDICAID

## 2023-03-03 ENCOUNTER — PATIENT MESSAGE (OUTPATIENT)
Dept: FAMILY MEDICINE | Facility: CLINIC | Age: 33
End: 2023-03-03
Payer: MEDICAID

## 2023-03-03 ENCOUNTER — TELEPHONE (OUTPATIENT)
Dept: FAMILY MEDICINE | Facility: CLINIC | Age: 33
End: 2023-03-03
Payer: MEDICAID

## 2023-03-03 NOTE — TELEPHONE ENCOUNTER
----- Message from Mona Shan sent at 3/3/2023 12:20 PM CST -----  Regarding: Sooner Appt Request  .Type:  Sooner Appointment Request    Patient is requesting a sooner appointment.  Patient declined first available appointment listed as well as another facility and provider .  Patient will not accept being placed on the waitlist and is requesting a message be sent to doctor.    Name of Caller:  self     When is the first available appointment?  April 6     Symptoms: stomach issues / diarrhea     Would the patient rather a call back or a response via [facility]? portal message     Best Call Back Number: [phone]      Additional Information: early morning or after 3pm please

## 2023-03-16 ENCOUNTER — OFFICE VISIT (OUTPATIENT)
Dept: FAMILY MEDICINE | Facility: CLINIC | Age: 33
End: 2023-03-16
Payer: MEDICAID

## 2023-03-16 ENCOUNTER — LAB VISIT (OUTPATIENT)
Dept: LAB | Facility: HOSPITAL | Age: 33
End: 2023-03-16
Attending: FAMILY MEDICINE
Payer: MEDICAID

## 2023-03-16 VITALS
OXYGEN SATURATION: 97 % | WEIGHT: 168 LBS | TEMPERATURE: 99 F | BODY MASS INDEX: 30.91 KG/M2 | HEIGHT: 62 IN | SYSTOLIC BLOOD PRESSURE: 126 MMHG | DIASTOLIC BLOOD PRESSURE: 86 MMHG

## 2023-03-16 DIAGNOSIS — Z11.59 ENCOUNTER FOR HEPATITIS C SCREENING TEST FOR LOW RISK PATIENT: ICD-10-CM

## 2023-03-16 DIAGNOSIS — Z00.00 ANNUAL PHYSICAL EXAM: ICD-10-CM

## 2023-03-16 DIAGNOSIS — K52.9 CHRONIC DIARRHEA: ICD-10-CM

## 2023-03-16 DIAGNOSIS — Z00.00 ANNUAL PHYSICAL EXAM: Primary | ICD-10-CM

## 2023-03-16 LAB
ALBUMIN SERPL BCP-MCNC: 4.1 G/DL (ref 3.5–5.2)
ALP SERPL-CCNC: 65 U/L (ref 55–135)
ALT SERPL W/O P-5'-P-CCNC: 57 U/L (ref 10–44)
ANION GAP SERPL CALC-SCNC: 9 MMOL/L (ref 8–16)
AST SERPL-CCNC: 42 U/L (ref 10–40)
BASOPHILS # BLD AUTO: 0.04 K/UL (ref 0–0.2)
BASOPHILS NFR BLD: 0.7 % (ref 0–1.9)
BILIRUB SERPL-MCNC: 0.2 MG/DL (ref 0.1–1)
BUN SERPL-MCNC: 7 MG/DL (ref 6–20)
CALCIUM SERPL-MCNC: 9.4 MG/DL (ref 8.7–10.5)
CHLORIDE SERPL-SCNC: 104 MMOL/L (ref 95–110)
CO2 SERPL-SCNC: 26 MMOL/L (ref 23–29)
CREAT SERPL-MCNC: 0.7 MG/DL (ref 0.5–1.4)
DIFFERENTIAL METHOD: NORMAL
EOSINOPHIL # BLD AUTO: 0.3 K/UL (ref 0–0.5)
EOSINOPHIL NFR BLD: 4.6 % (ref 0–8)
ERYTHROCYTE [DISTWIDTH] IN BLOOD BY AUTOMATED COUNT: 13.1 % (ref 11.5–14.5)
EST. GFR  (NO RACE VARIABLE): >60 ML/MIN/1.73 M^2
GLUCOSE SERPL-MCNC: 98 MG/DL (ref 70–110)
HCT VFR BLD AUTO: 39 % (ref 37–48.5)
HGB BLD-MCNC: 12.8 G/DL (ref 12–16)
IMM GRANULOCYTES # BLD AUTO: 0.01 K/UL (ref 0–0.04)
IMM GRANULOCYTES NFR BLD AUTO: 0.2 % (ref 0–0.5)
LYMPHOCYTES # BLD AUTO: 1.6 K/UL (ref 1–4.8)
LYMPHOCYTES NFR BLD: 27 % (ref 18–48)
MCH RBC QN AUTO: 29.2 PG (ref 27–31)
MCHC RBC AUTO-ENTMCNC: 32.8 G/DL (ref 32–36)
MCV RBC AUTO: 89 FL (ref 82–98)
MONOCYTES # BLD AUTO: 0.8 K/UL (ref 0.3–1)
MONOCYTES NFR BLD: 13 % (ref 4–15)
NEUTROPHILS # BLD AUTO: 3.3 K/UL (ref 1.8–7.7)
NEUTROPHILS NFR BLD: 54.5 % (ref 38–73)
NRBC BLD-RTO: 0 /100 WBC
PLATELET # BLD AUTO: 377 K/UL (ref 150–450)
PMV BLD AUTO: 10.1 FL (ref 9.2–12.9)
POTASSIUM SERPL-SCNC: 4 MMOL/L (ref 3.5–5.1)
PROT SERPL-MCNC: 8.3 G/DL (ref 6–8.4)
RBC # BLD AUTO: 4.38 M/UL (ref 4–5.4)
SODIUM SERPL-SCNC: 139 MMOL/L (ref 136–145)
WBC # BLD AUTO: 6.07 K/UL (ref 3.9–12.7)

## 2023-03-16 PROCEDURE — 3074F SYST BP LT 130 MM HG: CPT | Mod: CPTII,,, | Performed by: FAMILY MEDICINE

## 2023-03-16 PROCEDURE — 99395 PREV VISIT EST AGE 18-39: CPT | Mod: S$PBB,,, | Performed by: FAMILY MEDICINE

## 2023-03-16 PROCEDURE — 36415 COLL VENOUS BLD VENIPUNCTURE: CPT | Mod: PO | Performed by: FAMILY MEDICINE

## 2023-03-16 PROCEDURE — 1159F MED LIST DOCD IN RCRD: CPT | Mod: CPTII,,, | Performed by: FAMILY MEDICINE

## 2023-03-16 PROCEDURE — 99999 PR PBB SHADOW E&M-EST. PATIENT-LVL III: ICD-10-PCS | Mod: PBBFAC,,, | Performed by: FAMILY MEDICINE

## 2023-03-16 PROCEDURE — 85025 COMPLETE CBC W/AUTO DIFF WBC: CPT | Performed by: FAMILY MEDICINE

## 2023-03-16 PROCEDURE — 3074F PR MOST RECENT SYSTOLIC BLOOD PRESSURE < 130 MM HG: ICD-10-PCS | Mod: CPTII,,, | Performed by: FAMILY MEDICINE

## 2023-03-16 PROCEDURE — 1159F PR MEDICATION LIST DOCUMENTED IN MEDICAL RECORD: ICD-10-PCS | Mod: CPTII,,, | Performed by: FAMILY MEDICINE

## 2023-03-16 PROCEDURE — 1160F RVW MEDS BY RX/DR IN RCRD: CPT | Mod: CPTII,,, | Performed by: FAMILY MEDICINE

## 2023-03-16 PROCEDURE — 99395 PR PREVENTIVE VISIT,EST,18-39: ICD-10-PCS | Mod: S$PBB,,, | Performed by: FAMILY MEDICINE

## 2023-03-16 PROCEDURE — 90677 PCV20 VACCINE IM: CPT | Mod: PBBFAC,PO

## 2023-03-16 PROCEDURE — 99213 OFFICE O/P EST LOW 20 MIN: CPT | Mod: PBBFAC,PO | Performed by: FAMILY MEDICINE

## 2023-03-16 PROCEDURE — 3008F PR BODY MASS INDEX (BMI) DOCUMENTED: ICD-10-PCS | Mod: CPTII,,, | Performed by: FAMILY MEDICINE

## 2023-03-16 PROCEDURE — 1160F PR REVIEW ALL MEDS BY PRESCRIBER/CLIN PHARMACIST DOCUMENTED: ICD-10-PCS | Mod: CPTII,,, | Performed by: FAMILY MEDICINE

## 2023-03-16 PROCEDURE — 99999 PR PBB SHADOW E&M-EST. PATIENT-LVL III: CPT | Mod: PBBFAC,,, | Performed by: FAMILY MEDICINE

## 2023-03-16 PROCEDURE — 3008F BODY MASS INDEX DOCD: CPT | Mod: CPTII,,, | Performed by: FAMILY MEDICINE

## 2023-03-16 PROCEDURE — 3079F DIAST BP 80-89 MM HG: CPT | Mod: CPTII,,, | Performed by: FAMILY MEDICINE

## 2023-03-16 PROCEDURE — 80053 COMPREHEN METABOLIC PANEL: CPT | Performed by: FAMILY MEDICINE

## 2023-03-16 PROCEDURE — 86803 HEPATITIS C AB TEST: CPT | Performed by: FAMILY MEDICINE

## 2023-03-16 PROCEDURE — 3079F PR MOST RECENT DIASTOLIC BLOOD PRESSURE 80-89 MM HG: ICD-10-PCS | Mod: CPTII,,, | Performed by: FAMILY MEDICINE

## 2023-03-16 RX ORDER — CHOLESTYRAMINE 4 G/9G
4 POWDER, FOR SUSPENSION ORAL
Qty: 270 PACKET | Refills: 3 | Status: SHIPPED | OUTPATIENT
Start: 2023-03-16 | End: 2024-03-15

## 2023-03-16 NOTE — PROGRESS NOTES
Subjective:       Patient ID: Alejandra Garcia is a 32 y.o. female.    Chief Complaint: No chief complaint on file.    32 year old female presntes for an annual exam.     Past Medical History:   Diagnosis Date    Menarche     Age of onset 12    Multiple sclerosis     S/P tubal ligation 10/25/2021      Past Surgical History:   Procedure Laterality Date     SECTION, LOW TRANSVERSE      Breech in labor    CHOLECYSTECTOMY      LAPAROSCOPIC SALPINGECTOMY Bilateral 10/25/2021    Procedure: SALPINGECTOMY, LAPAROSCOPIC;  Surgeon: Mima Bains MD;  Location: [facility];  Service: OB/GYN;  Laterality: Bilateral;    post partem      6 weeks     VAGINAL BIRTH AFTER  SECTION  2017     Family History   Problem Relation Age of Onset    Lupus Mother     Hypertension Mother     Diabetes Father     Hypertension Maternal Grandmother     Asthma Maternal Grandmother     Breast cancer Neg Hx     Colon cancer Neg Hx     Ovarian cancer Neg Hx      Social History     Socioeconomic History    Marital status:    Tobacco Use    Smoking status: Never    Smokeless tobacco: Never   Substance and Sexual Activity    Alcohol use: No    Drug use: No    Sexual activity: Yes     Partners: Male     Birth control/protection: None, Condom     Comment:  since        Review of Systems   Respiratory:  Negative for cough, chest tightness, shortness of breath and wheezing.    Cardiovascular:  Negative for chest pain and leg swelling.   Gastrointestinal:  Positive for diarrhea. Negative for abdominal pain, blood in stool, nausea and vomiting.        Diarrhea with meals after having her gallbladder removed   Genitourinary:  Negative for dysuria and hematuria.   Neurological:  Positive for headaches. Negative for dizziness, syncope and light-headedness.       Objective:      Vitals:    23 1119   BP: 126/86   Temp: 98.9 °F (37.2 °C)   TempSrc: Oral   SpO2: 97%   Weight: 76.2 kg (167 lb 15.9  "oz)   Height: 5' 2" (1.575 m)       Physical Exam  Constitutional:       General: She is not in acute distress.     Appearance: She is well-developed. She is not diaphoretic.   HENT:      Head: Normocephalic and atraumatic.      Right Ear: External ear normal.      Left Ear: External ear normal.      Mouth/Throat:      Pharynx: No oropharyngeal exudate.   Eyes:      Conjunctiva/sclera: Conjunctivae normal.   Neck:      Thyroid: No thyromegaly.      Vascular: No carotid bruit.   Cardiovascular:      Rate and Rhythm: Normal rate and regular rhythm.      Heart sounds: Normal heart sounds. No murmur heard.    No friction rub. No gallop.   Pulmonary:      Effort: Pulmonary effort is normal. No respiratory distress.      Breath sounds: Normal breath sounds. No stridor. No wheezing, rhonchi or rales.   Abdominal:      General: Abdomen is flat. Bowel sounds are normal. There is no distension.      Palpations: Abdomen is soft. There is no mass.      Tenderness: There is no abdominal tenderness. There is no guarding or rebound.      Hernia: No hernia is present.   Musculoskeletal:      Cervical back: Normal range of motion and neck supple.      Right lower leg: No edema.      Left lower leg: No edema.   Lymphadenopathy:      Cervical: No cervical adenopathy.   Skin:     Findings: No rash.   Neurological:      Mental Status: She is alert and oriented to person, place, and time.   Psychiatric:         Mood and Affect: Mood normal.         Behavior: Behavior normal.       Assessment:       Problem List Items Addressed This Visit    None  Visit Diagnoses       Annual physical exam    -  Primary    Relevant Orders    CBC Auto Differential    Comprehensive Metabolic Panel    Lipid Panel    Encounter for hepatitis C screening test for low risk patient        Relevant Orders    Hepatitis C Antibody              Plan:       Diagnoses and all orders for this visit:    Annual physical exam  -     CBC Auto Differential; Future  -     " Comprehensive Metabolic Panel; Future  -     Lipid Panel; Future    Encounter for hepatitis C screening test for low risk patient  -     Hepatitis C Antibody; Future    Other orders  -     (In Office Administered) Pneumococcal Conjugate Vaccine (20 Valent) (IM)

## 2023-03-17 LAB — HCV AB SERPL QL IA: NORMAL

## 2023-07-21 ENCOUNTER — PATIENT MESSAGE (OUTPATIENT)
Dept: PSYCHIATRY | Facility: CLINIC | Age: 33
End: 2023-07-21
Payer: MEDICAID

## 2024-01-22 ENCOUNTER — PATIENT MESSAGE (OUTPATIENT)
Dept: PSYCHIATRY | Facility: CLINIC | Age: 34
End: 2024-01-22
Payer: COMMERCIAL

## 2024-02-06 ENCOUNTER — LAB VISIT (OUTPATIENT)
Dept: LAB | Facility: HOSPITAL | Age: 34
End: 2024-02-06
Attending: FAMILY MEDICINE
Payer: COMMERCIAL

## 2024-02-06 ENCOUNTER — OFFICE VISIT (OUTPATIENT)
Dept: FAMILY MEDICINE | Facility: CLINIC | Age: 34
End: 2024-02-06
Payer: COMMERCIAL

## 2024-02-06 VITALS
HEIGHT: 62 IN | DIASTOLIC BLOOD PRESSURE: 84 MMHG | BODY MASS INDEX: 32.01 KG/M2 | HEART RATE: 100 BPM | RESPIRATION RATE: 16 BRPM | WEIGHT: 173.94 LBS | TEMPERATURE: 98 F | OXYGEN SATURATION: 97 % | SYSTOLIC BLOOD PRESSURE: 122 MMHG

## 2024-02-06 DIAGNOSIS — R10.9 ABDOMINAL PAIN, UNSPECIFIED ABDOMINAL LOCATION: ICD-10-CM

## 2024-02-06 DIAGNOSIS — Z00.00 ANNUAL PHYSICAL EXAM: ICD-10-CM

## 2024-02-06 DIAGNOSIS — Z00.00 ANNUAL PHYSICAL EXAM: Primary | ICD-10-CM

## 2024-02-06 LAB
ALBUMIN SERPL BCP-MCNC: 3.9 G/DL (ref 3.5–5.2)
ALP SERPL-CCNC: 71 U/L (ref 55–135)
ALT SERPL W/O P-5'-P-CCNC: 65 U/L (ref 10–44)
ANION GAP SERPL CALC-SCNC: 6 MMOL/L (ref 8–16)
AST SERPL-CCNC: 43 U/L (ref 10–40)
BASOPHILS # BLD AUTO: 0.07 K/UL (ref 0–0.2)
BASOPHILS NFR BLD: 0.9 % (ref 0–1.9)
BILIRUB SERPL-MCNC: 0.3 MG/DL (ref 0.1–1)
BUN SERPL-MCNC: 8 MG/DL (ref 6–20)
CALCIUM SERPL-MCNC: 9.2 MG/DL (ref 8.7–10.5)
CHLORIDE SERPL-SCNC: 104 MMOL/L (ref 95–110)
CHOLEST SERPL-MCNC: 136 MG/DL (ref 120–199)
CHOLEST/HDLC SERPL: 4.5 {RATIO} (ref 2–5)
CO2 SERPL-SCNC: 28 MMOL/L (ref 23–29)
CREAT SERPL-MCNC: 0.7 MG/DL (ref 0.5–1.4)
DIFFERENTIAL METHOD BLD: NORMAL
EOSINOPHIL # BLD AUTO: 0.3 K/UL (ref 0–0.5)
EOSINOPHIL NFR BLD: 4 % (ref 0–8)
ERYTHROCYTE [DISTWIDTH] IN BLOOD BY AUTOMATED COUNT: 12.6 % (ref 11.5–14.5)
EST. GFR  (NO RACE VARIABLE): >60 ML/MIN/1.73 M^2
GLUCOSE SERPL-MCNC: 97 MG/DL (ref 70–110)
HCT VFR BLD AUTO: 39 % (ref 37–48.5)
HDLC SERPL-MCNC: 30 MG/DL (ref 40–75)
HDLC SERPL: 22.1 % (ref 20–50)
HGB BLD-MCNC: 12.6 G/DL (ref 12–16)
IMM GRANULOCYTES # BLD AUTO: 0.01 K/UL (ref 0–0.04)
IMM GRANULOCYTES NFR BLD AUTO: 0.1 % (ref 0–0.5)
LDLC SERPL CALC-MCNC: 30.6 MG/DL (ref 63–159)
LYMPHOCYTES # BLD AUTO: 1.8 K/UL (ref 1–4.8)
LYMPHOCYTES NFR BLD: 23.3 % (ref 18–48)
MCH RBC QN AUTO: 28.8 PG (ref 27–31)
MCHC RBC AUTO-ENTMCNC: 32.3 G/DL (ref 32–36)
MCV RBC AUTO: 89 FL (ref 82–98)
MONOCYTES # BLD AUTO: 0.9 K/UL (ref 0.3–1)
MONOCYTES NFR BLD: 11.1 % (ref 4–15)
NEUTROPHILS # BLD AUTO: 4.7 K/UL (ref 1.8–7.7)
NEUTROPHILS NFR BLD: 60.6 % (ref 38–73)
NONHDLC SERPL-MCNC: 106 MG/DL
NRBC BLD-RTO: 0 /100 WBC
PLATELET # BLD AUTO: 350 K/UL (ref 150–450)
PMV BLD AUTO: 9.7 FL (ref 9.2–12.9)
POTASSIUM SERPL-SCNC: 3.9 MMOL/L (ref 3.5–5.1)
PROT SERPL-MCNC: 8.3 G/DL (ref 6–8.4)
RBC # BLD AUTO: 4.37 M/UL (ref 4–5.4)
SODIUM SERPL-SCNC: 138 MMOL/L (ref 136–145)
TRIGL SERPL-MCNC: 377 MG/DL (ref 30–150)
TSH SERPL DL<=0.005 MIU/L-ACNC: 0.55 UIU/ML (ref 0.4–4)
WBC # BLD AUTO: 7.72 K/UL (ref 3.9–12.7)

## 2024-02-06 PROCEDURE — 1160F RVW MEDS BY RX/DR IN RCRD: CPT | Mod: CPTII,S$GLB,, | Performed by: FAMILY MEDICINE

## 2024-02-06 PROCEDURE — 85025 COMPLETE CBC W/AUTO DIFF WBC: CPT | Performed by: FAMILY MEDICINE

## 2024-02-06 PROCEDURE — 80061 LIPID PANEL: CPT | Performed by: FAMILY MEDICINE

## 2024-02-06 PROCEDURE — 99999 PR PBB SHADOW E&M-EST. PATIENT-LVL IV: CPT | Mod: PBBFAC,,, | Performed by: FAMILY MEDICINE

## 2024-02-06 PROCEDURE — 80053 COMPREHEN METABOLIC PANEL: CPT | Performed by: FAMILY MEDICINE

## 2024-02-06 PROCEDURE — 1159F MED LIST DOCD IN RCRD: CPT | Mod: CPTII,S$GLB,, | Performed by: FAMILY MEDICINE

## 2024-02-06 PROCEDURE — 3008F BODY MASS INDEX DOCD: CPT | Mod: CPTII,S$GLB,, | Performed by: FAMILY MEDICINE

## 2024-02-06 PROCEDURE — 3074F SYST BP LT 130 MM HG: CPT | Mod: CPTII,S$GLB,, | Performed by: FAMILY MEDICINE

## 2024-02-06 PROCEDURE — 84443 ASSAY THYROID STIM HORMONE: CPT | Performed by: FAMILY MEDICINE

## 2024-02-06 PROCEDURE — 36415 COLL VENOUS BLD VENIPUNCTURE: CPT | Mod: PO | Performed by: FAMILY MEDICINE

## 2024-02-06 PROCEDURE — 99395 PREV VISIT EST AGE 18-39: CPT | Mod: S$GLB,,, | Performed by: FAMILY MEDICINE

## 2024-02-06 PROCEDURE — 3079F DIAST BP 80-89 MM HG: CPT | Mod: CPTII,S$GLB,, | Performed by: FAMILY MEDICINE

## 2024-02-06 NOTE — PROGRESS NOTES
Subjective     Patient ID: Alejandra Garcia is a 33 y.o. female.    Chief Complaint: Annual Exam    33 year old here for an annual exam        Past Medical History:  No date: Menarche      Comment:  Age of onset 12  : Multiple sclerosis  10/25/2021: S/P tubal ligation   Past Surgical History:  :  SECTION, LOW TRANSVERSE      Comment:  Breech in labor  2015: CHOLECYSTECTOMY  10/25/2021: LAPAROSCOPIC SALPINGECTOMY; Bilateral      Comment:  Procedure: SALPINGECTOMY, LAPAROSCOPIC;  Surgeon:                Mima Bains MD;  Location: [facility];  Service:                OB/GYN;  Laterality: Bilateral;  No date: post partem      Comment:  6 weeks 2017: VAGINAL BIRTH AFTER  SECTION  Review of patient's family history indicates:  Problem: Lupus      Relation: Mother          Age of Onset: (Not Specified)  Problem: Hypertension      Relation: Mother          Age of Onset: (Not Specified)  Problem: Diabetes      Relation: Father          Age of Onset: (Not Specified)  Problem: Hypertension      Relation: Maternal Grandmother          Age of Onset: (Not Specified)  Problem: Asthma      Relation: Maternal Grandmother          Age of Onset: (Not Specified)  Problem: Breast cancer      Relation: Neg Hx          Age of Onset: (Not Specified)  Problem: Colon cancer      Relation: Neg Hx          Age of Onset: (Not Specified)  Problem: Ovarian cancer      Relation: Neg Hx          Age of Onset: (Not Specified)    Social History    Socioeconomic History      Marital status:     Tobacco Use      Smoking status: Never      Smokeless tobacco: Never    Substance and Sexual Activity      Alcohol use: No      Drug use: No      Sexual activity: Yes        Partners: Male        Birth control/protection: None, Condom        Comment:  since     Social Determinants of Health  Financial Resource Strain: Low Risk  (2024)      Overall Financial Resource Strain (CARDIA)           Difficulty of Paying Living Expenses: Not hard at all  Food Insecurity: No Food Insecurity (2/5/2024)      Hunger Vital Sign          Worried About Running Out of Food in the Last Year: Never true          Ran Out of Food in the Last Year: Never true  Transportation Needs: No Transportation Needs (2/5/2024)      PRAPARE - Transportation          Lack of Transportation (Medical): No          Lack of Transportation (Non-Medical): No  Physical Activity: Sufficiently Active (2/5/2024)      Exercise Vital Sign          Days of Exercise per Week: 7 days          Minutes of Exercise per Session: 30 min  Stress: No Stress Concern Present (2/5/2024)      [place] [employer] - Occupational Stress Questionnaire          Feeling of Stress : Only a little  Social Connections: Unknown (2/5/2024)      Social Connection and Isolation Panel [NHANES]          Frequency of Communication with Friends and Family: Three times a week          Frequency of Social Gatherings with Friends and Family: Twice a week           Active Member of Clubs or Organizations: Yes          Attends Club or Organization Meetings: More than 4 times per year          Marital Status:   Housing Stability: Low Risk  (2/5/2024)      Housing Stability Vital Sign          Unable to Pay for Housing in the Last Year: No          Number of Places Lived in the Last Year: 2          Unstable Housing in the Last Year: No          Review of Systems   Respiratory:  Negative for cough, chest tightness, shortness of breath and wheezing.    Cardiovascular:  Negative for chest pain.   Gastrointestinal:  Positive for abdominal pain. Negative for blood in stool, constipation, diarrhea, nausea, vomiting and reflux.   Endocrine: Negative for polydipsia and polyuria.   Genitourinary:  Negative for dysuria and hematuria.   Neurological:  Positive for headaches. Negative for dizziness, syncope and light-headedness.        Headahces with her Rebiff         "  Objective   Vitals:    02/06/24 1107   BP: 122/84   Pulse: 100   Resp: 16   Temp: 98.3 °F (36.8 °C)   TempSrc: Oral   SpO2: 97%   Weight: 78.9 kg (173 lb 15.1 oz)   Height: 5' 2" (1.575 m)       Physical Exam  Constitutional:       General: She is not in acute distress.     Appearance: She is well-developed. She is not diaphoretic.   HENT:      Head: Normocephalic.      Right Ear: External ear normal.      Left Ear: External ear normal.      Mouth/Throat:      Pharynx: No oropharyngeal exudate.   Eyes:      Conjunctiva/sclera: Conjunctivae normal.   Neck:      Thyroid: No thyromegaly.   Cardiovascular:      Rate and Rhythm: Normal rate and regular rhythm.      Heart sounds: Normal heart sounds. No murmur heard.     No friction rub. No gallop.   Pulmonary:      Effort: Pulmonary effort is normal. No respiratory distress.      Breath sounds: Normal breath sounds. No stridor. No wheezing, rhonchi or rales.   Abdominal:      General: Bowel sounds are normal. There is no distension.      Palpations: Abdomen is soft. There is no mass.      Tenderness: There is no abdominal tenderness. There is no guarding or rebound.   Musculoskeletal:      Cervical back: Normal range of motion and neck supple.   Lymphadenopathy:      Cervical: No cervical adenopathy.   Skin:     Findings: No rash.   Neurological:      General: No focal deficit present.      Mental Status: She is alert and oriented to person, place, and time.            Assessment and Plan     1. Annual physical exam  -     CBC Auto Differential; Future; Expected date: 02/06/2024  -     Comprehensive Metabolic Panel; Future; Expected date: 02/06/2024  -     Lipid Panel; Future; Expected date: 02/06/2024  -     TSH; Future; Expected date: 02/06/2024                 No follow-ups on file.    "

## 2024-02-07 ENCOUNTER — PATIENT MESSAGE (OUTPATIENT)
Dept: FAMILY MEDICINE | Facility: CLINIC | Age: 34
End: 2024-02-07
Payer: COMMERCIAL

## 2024-02-07 DIAGNOSIS — E78.1 HIGH TRIGLYCERIDES: Primary | ICD-10-CM

## 2024-02-08 ENCOUNTER — LAB VISIT (OUTPATIENT)
Dept: LAB | Facility: HOSPITAL | Age: 34
End: 2024-02-08
Attending: NURSE PRACTITIONER
Payer: COMMERCIAL

## 2024-02-08 DIAGNOSIS — E78.1 HIGH TRIGLYCERIDES: ICD-10-CM

## 2024-02-08 LAB
CHOLEST SERPL-MCNC: 136 MG/DL (ref 120–199)
CHOLEST/HDLC SERPL: 3.9 {RATIO} (ref 2–5)
HDLC SERPL-MCNC: 35 MG/DL (ref 40–75)
HDLC SERPL: 25.7 % (ref 20–50)
LDLC SERPL CALC-MCNC: 73.2 MG/DL (ref 63–159)
NONHDLC SERPL-MCNC: 101 MG/DL
TRIGL SERPL-MCNC: 139 MG/DL (ref 30–150)

## 2024-02-08 PROCEDURE — 36415 COLL VENOUS BLD VENIPUNCTURE: CPT | Mod: PO | Performed by: NURSE PRACTITIONER

## 2024-02-08 PROCEDURE — 80061 LIPID PANEL: CPT | Performed by: NURSE PRACTITIONER

## 2024-02-21 ENCOUNTER — HOSPITAL ENCOUNTER (OUTPATIENT)
Dept: RADIOLOGY | Facility: HOSPITAL | Age: 34
Discharge: HOME | End: 2024-02-21
Attending: FAMILY MEDICINE
Payer: COMMERCIAL

## 2024-02-21 DIAGNOSIS — R10.9 ABDOMINAL PAIN, UNSPECIFIED ABDOMINAL LOCATION: ICD-10-CM

## 2024-02-21 PROCEDURE — 76700 US EXAM ABDOM COMPLETE: CPT | Mod: 26,,, | Performed by: RADIOLOGY

## 2024-02-21 PROCEDURE — 76700 US EXAM ABDOM COMPLETE: CPT | Mod: TC

## 2024-02-23 ENCOUNTER — PATIENT MESSAGE (OUTPATIENT)
Dept: FAMILY MEDICINE | Facility: CLINIC | Age: 34
End: 2024-02-23
Payer: COMMERCIAL

## 2024-02-28 NOTE — TELEPHONE ENCOUNTER
----- Message from Germaine Matias sent at 2/28/2024  9:04 AM CST -----  Regarding: Self .[phone]  Who Called: Self     Name of Test (Lab/Mammo/Etc):       Date of Test:  02/21    Where the test was performed: [facility]     Would the patient rather a call back or a response via [facility]?  Call back     Best Call Back Number:  [phone]      Additional Information:   Pt left a msg on my chart on Friday and no response still     For Clinical Team:Has the provider reviewed the results?

## 2024-05-02 ENCOUNTER — PATIENT MESSAGE (OUTPATIENT)
Dept: PSYCHIATRY | Facility: CLINIC | Age: 34
End: 2024-05-02
Payer: COMMERCIAL

## 2024-06-21 ENCOUNTER — PATIENT MESSAGE (OUTPATIENT)
Dept: FAMILY MEDICINE | Facility: CLINIC | Age: 34
End: 2024-06-21
Payer: COMMERCIAL

## 2024-07-25 ENCOUNTER — PATIENT MESSAGE (OUTPATIENT)
Dept: PSYCHIATRY | Facility: CLINIC | Age: 34
End: 2024-07-25
Payer: COMMERCIAL

## 2024-08-05 ENCOUNTER — PATIENT MESSAGE (OUTPATIENT)
Dept: PSYCHIATRY | Facility: CLINIC | Age: 34
End: 2024-08-05
Payer: COMMERCIAL

## 2024-08-07 ENCOUNTER — PATIENT MESSAGE (OUTPATIENT)
Dept: FAMILY MEDICINE | Facility: CLINIC | Age: 34
End: 2024-08-07
Payer: COMMERCIAL

## 2024-08-07 DIAGNOSIS — G35 MS (MULTIPLE SCLEROSIS): Primary | ICD-10-CM

## 2024-08-08 ENCOUNTER — TELEPHONE (OUTPATIENT)
Dept: NEUROLOGY | Facility: CLINIC | Age: 34
End: 2024-08-08
Payer: COMMERCIAL

## 2024-08-08 NOTE — TELEPHONE ENCOUNTER
Referral to neuro from pcp,Mri reports and neuro notes in Epic. Msg to Rosemary to sched new MS pt with Dr Hyman or Dr Pettit and ask pt to bring CD of MRIs

## 2024-08-08 NOTE — TELEPHONE ENCOUNTER
----- Message from Kasandra Dickerson sent at 8/8/2024  9:30 AM CDT -----  Regarding: appt access  Contact: pt @ [phone]  Pt is calling to be scheduled with provider from referral for  G35 (ICD-10-CM) - MS (multiple sclerosis) and she would only like to see an MD. Please call to advise further as there were no appts that I could schedule. Thank you for all you are doing.

## 2024-08-09 ENCOUNTER — TELEPHONE (OUTPATIENT)
Dept: NEUROLOGY | Facility: CLINIC | Age: 34
End: 2024-08-09
Payer: COMMERCIAL

## 2024-08-27 ENCOUNTER — OFFICE VISIT (OUTPATIENT)
Dept: OBSTETRICS AND GYNECOLOGY | Facility: CLINIC | Age: 34
End: 2024-08-27
Payer: COMMERCIAL

## 2024-08-27 VITALS
HEIGHT: 62 IN | WEIGHT: 161.19 LBS | SYSTOLIC BLOOD PRESSURE: 120 MMHG | DIASTOLIC BLOOD PRESSURE: 68 MMHG | BODY MASS INDEX: 29.66 KG/M2

## 2024-08-27 DIAGNOSIS — Z01.419 ENCOUNTER FOR GYNECOLOGICAL EXAMINATION: Primary | ICD-10-CM

## 2024-08-27 DIAGNOSIS — Z11.3 SCREEN FOR STD (SEXUALLY TRANSMITTED DISEASE): ICD-10-CM

## 2024-08-27 PROCEDURE — 99999 PR PBB SHADOW E&M-EST. PATIENT-LVL III: CPT | Mod: PBBFAC,,, | Performed by: OBSTETRICS & GYNECOLOGY

## 2024-08-27 PROCEDURE — 3008F BODY MASS INDEX DOCD: CPT | Mod: CPTII,S$GLB,, | Performed by: OBSTETRICS & GYNECOLOGY

## 2024-08-27 PROCEDURE — 3078F DIAST BP <80 MM HG: CPT | Mod: CPTII,S$GLB,, | Performed by: OBSTETRICS & GYNECOLOGY

## 2024-08-27 PROCEDURE — 1159F MED LIST DOCD IN RCRD: CPT | Mod: CPTII,S$GLB,, | Performed by: OBSTETRICS & GYNECOLOGY

## 2024-08-27 PROCEDURE — 3074F SYST BP LT 130 MM HG: CPT | Mod: CPTII,S$GLB,, | Performed by: OBSTETRICS & GYNECOLOGY

## 2024-08-27 PROCEDURE — 87591 N.GONORRHOEAE DNA AMP PROB: CPT | Performed by: OBSTETRICS & GYNECOLOGY

## 2024-08-27 PROCEDURE — 87491 CHLMYD TRACH DNA AMP PROBE: CPT | Performed by: OBSTETRICS & GYNECOLOGY

## 2024-08-27 PROCEDURE — 99395 PREV VISIT EST AGE 18-39: CPT | Mod: S$GLB,,, | Performed by: OBSTETRICS & GYNECOLOGY

## 2024-08-27 RX ORDER — INTERFERON BETA-1A 44 UG/.5ML
INJECTION, SOLUTION SUBCUTANEOUS
COMMUNITY
Start: 2024-06-07

## 2024-08-27 RX ORDER — INTERFERON BETA-1A 44 UG/.5ML
44 INJECTION, SOLUTION SUBCUTANEOUS
COMMUNITY
Start: 2024-08-14 | End: 2025-08-14

## 2024-08-27 NOTE — PROGRESS NOTES
HPI: Pt is a 34 y.o.  female who presents for routine annual exam. She uses Laparoscopic salpingectomy for contraception. She does want STD screening. No gyn complaints. Periods have improved since I last saw her in 2022.     Hx C/section x 1 (breech),  x 2     Pap/HPV 2022 NORMAL, HPV negative    No family hx of breast/ovarian cancer    ROS:  GENERAL: Feeling well overall. Denies fever or chills.   SKIN: Denies rash or lesions.   HEAD: Denies head injury or headache.   NODES: Denies enlarged lymph nodes.   CHEST: Denies chest pain or shortness of breath.   CARDIOVASCULAR: Denies palpitations or left sided chest pain.   ABDOMEN: No abdominal pain, constipation, diarrhea, nausea or vomiting   URINARY: No dysuria, hematuria, or burning on urination.  REPRODUCTIVE: See HPI.   BREASTS: Denies pain, lumps, or nipple discharge.   HEMATOLOGIC: No easy bruisability or excessive bleeding.   MUSCULOSKELETAL: Denies joint pain or swelling.   NEUROLOGIC: Denies syncope or weakness.   PSYCHIATRIC: Denies acute depression or anxiety     PE:   APPEARANCE: Well nourished, well developed, friendly White female in no acute distress.  NODES: no inguinal lymphadenopathy  BREASTS: Symmetrical, no skin changes or visible lesions. No palpable masses, nipple discharge or adenopathy bilaterally.  ABDOMEN: Soft. No tenderness or masses. No distention.   VULVA: No lesions. Normal external female genitalia.  URETHRAL MEATUS: Normal size and location, no lesions, no prolapse.  URETHRA: No masses, tenderness, or prolapse.  VAGINA: Moist. No lesions or lacerations noted. No abnormal discharge present. No odor present.   CERVIX: No lesions or discharge. No cervical motion tenderness.   UTERUS: Normal size, regular shape, mobile, non-tender.  ADNEXA: No tenderness. No fullness or masses palpated in the adnexal regions.   ANUS PERINEUM: Normal.      Diagnosis:  1. Encounter for gynecological examination    2. Screen for STD (sexually  transmitted disease)        Plan:     Orders Placed This Encounter    C. trachomatis/N. gonorrhoeae by [facility]; Cervix     - Pap/HPV up to date    Patient was counseled today on the new ACS guidelines for cervical cytology screening as well as the current recommendations for breast cancer screening. She was counseled to follow up with her PCP for other routine health maintenance.     Follow-up with me in 1 year for routine exam; pap/HPV Next year.

## 2024-08-28 LAB
C TRACH DNA SPEC QL NAA+PROBE: NOT DETECTED
N GONORRHOEA DNA SPEC QL NAA+PROBE: NOT DETECTED

## 2024-10-16 ENCOUNTER — OFFICE VISIT (OUTPATIENT)
Dept: NEUROLOGY | Facility: CLINIC | Age: 34
End: 2024-10-16
Payer: COMMERCIAL

## 2024-10-16 ENCOUNTER — LAB VISIT (OUTPATIENT)
Dept: LAB | Facility: HOSPITAL | Age: 34
End: 2024-10-16
Attending: STUDENT IN AN ORGANIZED HEALTH CARE EDUCATION/TRAINING PROGRAM
Payer: COMMERCIAL

## 2024-10-16 VITALS
HEIGHT: 62 IN | SYSTOLIC BLOOD PRESSURE: 129 MMHG | WEIGHT: 164.25 LBS | BODY MASS INDEX: 30.22 KG/M2 | DIASTOLIC BLOOD PRESSURE: 84 MMHG | HEART RATE: 89 BPM

## 2024-10-16 DIAGNOSIS — G35 MS (MULTIPLE SCLEROSIS): Primary | ICD-10-CM

## 2024-10-16 DIAGNOSIS — G35 MS (MULTIPLE SCLEROSIS): ICD-10-CM

## 2024-10-16 PROCEDURE — 83520 IMMUNOASSAY QUANT NOS NONAB: CPT | Performed by: STUDENT IN AN ORGANIZED HEALTH CARE EDUCATION/TRAINING PROGRAM

## 2024-10-16 PROCEDURE — G2211 COMPLEX E/M VISIT ADD ON: HCPCS | Mod: S$GLB,,, | Performed by: STUDENT IN AN ORGANIZED HEALTH CARE EDUCATION/TRAINING PROGRAM

## 2024-10-16 PROCEDURE — 3074F SYST BP LT 130 MM HG: CPT | Mod: CPTII,S$GLB,, | Performed by: STUDENT IN AN ORGANIZED HEALTH CARE EDUCATION/TRAINING PROGRAM

## 2024-10-16 PROCEDURE — 3008F BODY MASS INDEX DOCD: CPT | Mod: CPTII,S$GLB,, | Performed by: STUDENT IN AN ORGANIZED HEALTH CARE EDUCATION/TRAINING PROGRAM

## 2024-10-16 PROCEDURE — 0361U NEURFLMNT LT CHN DIG IA QUAN: CPT | Performed by: STUDENT IN AN ORGANIZED HEALTH CARE EDUCATION/TRAINING PROGRAM

## 2024-10-16 PROCEDURE — 99205 OFFICE O/P NEW HI 60 MIN: CPT | Mod: S$GLB,,, | Performed by: STUDENT IN AN ORGANIZED HEALTH CARE EDUCATION/TRAINING PROGRAM

## 2024-10-16 PROCEDURE — 1159F MED LIST DOCD IN RCRD: CPT | Mod: CPTII,S$GLB,, | Performed by: STUDENT IN AN ORGANIZED HEALTH CARE EDUCATION/TRAINING PROGRAM

## 2024-10-16 PROCEDURE — 99999 PR PBB SHADOW E&M-EST. PATIENT-LVL IV: CPT | Mod: PBBFAC,,, | Performed by: STUDENT IN AN ORGANIZED HEALTH CARE EDUCATION/TRAINING PROGRAM

## 2024-10-16 PROCEDURE — 3079F DIAST BP 80-89 MM HG: CPT | Mod: CPTII,S$GLB,, | Performed by: STUDENT IN AN ORGANIZED HEALTH CARE EDUCATION/TRAINING PROGRAM

## 2024-10-16 NOTE — PROGRESS NOTES
"[facility]  New Patient Visit      Disease Summary     Principle neurological diagnosis: MS    Date of symptom onset: Jul 2013  Date of diagnosis: Jul 2013  Disease type at diagnosis: RR  Disease type currently: RR  Previous therapy: NA  Current therapy: Rebif 2013 (off during pregnancy in 2014, 2018, and 2021)  Last MRI Brain: 10/10/24 stable  Last MRI C-spine: 10/10/24 stable   Last MRI T-spine: 10/10/24 stable   7/5/13: IgG index 1.64, 12 CSF OCBs, 5W, 1R, normal protein and glucose, normal ACE, neg NMO IgG in CSF  Other relevant labs and tests:  JCV: No results found for: "JCVINDEX", "JCVANTIBODY"  Vit D:   Lab Results   Component Value Date    [id number] 22 (L) 07/04/2013   Vitamin D 7/2024: 68.5     History:     Previous records (physician notes, laboratory reports, and radiology reports) and imaging studies were reviewed and summarized. My recommendations will be communicated back to the patient's physician(s) by mail. Follow-up is expected to be with the patient's primary care physician.   This is a patient transitioning her care from Dr. Almanza for MS  She presents with spouse and son today.     She was diagnosed in July 20, 2013 after presenting with right-sided numbness.  MRI at that time consistent with MS.  She was started on Rebif briefly before becoming pregnant 2014 for which she was off of Rebif during her pregnancy, and then she resumed in 2014.  She became pregnant again in November of 2016 during that time also stopped Rebif.  She restarted Rebif in 2018.  She has been experiencing myalgia and headaches after each injection and some injection pain.  In February 20, 2021 she became pregnant and stopped Rebif and resumed in November of 2021.    She experience a relapse in March 2022 when she developed new L hand and face numbness. During that time she was also taking Rebif only 2x/week due to injection troubles. MRI Brain and C/T spine demonstrated new lesions. Symptoms " improved without steroids. Follow up MRI so far has been stable.    Current symptoms:  -R Face and hand intermittent numbness  -Intermittent L face muscle tightness     She prefers Rebif autoinjector.     She is on vit D 5000 IU daily.       She denies weakness, dysphagia, dysarthria, spasticity, visual changes, cognitive changes, mood disorder, gait disturbance, incoordination, pain, heat sensitivity, fatigue, bladder and bowel dysfunction, sexual dysfunction.     Neurofilament: 1.71 pg/mL     She works as a .   ROS:     A complete 9 system ROS was reviewed by me and otherwise negative .     Past Medical History:   Diagnosis Date    Menarche     Age of onset 12    Multiple sclerosis     S/P tubal ligation 10/25/2021       Past Surgical History:   Procedure Laterality Date     SECTION, LOW TRANSVERSE      Breech in labor    CHOLECYSTECTOMY      LAPAROSCOPIC SALPINGECTOMY Bilateral 10/25/2021    Procedure: SALPINGECTOMY, LAPAROSCOPIC;  Surgeon: Mima Bains MD;  Location: [facility];  Service: OB/GYN;  Laterality: Bilateral;    post partem      6 weeks     VAGINAL BIRTH AFTER  SECTION  2017       Family History   Problem Relation Name Age of Onset    Lupus Mother      Hypertension Mother      Diabetes Father      Hypertension Maternal Grandmother      Asthma Maternal Grandmother      Breast cancer Neg Hx      Colon cancer Neg Hx      Ovarian cancer Neg Hx      Multiple sclerosis Neg Hx         Social History     Socioeconomic History    Marital status:    Tobacco Use    Smoking status: Never    Smokeless tobacco: Never   Substance and Sexual Activity    Alcohol use: No    Drug use: No    Sexual activity: Yes     Partners: Male     Birth control/protection: None, Condom     Comment:  since      Social Drivers of Health     Financial Resource Strain: Low Risk  (2024)    Overall Financial Resource Strain (CARDIA)     Difficulty of  "Paying Living Expenses: Not hard at all   Food Insecurity: No Food Insecurity (2/5/2024)    Hunger Vital Sign     Worried About Running Out of Food in the Last Year: Never true     Ran Out of Food in the Last Year: Never true   Transportation Needs: No Transportation Needs (2/5/2024)    PRAPARE - Transportation     Lack of Transportation (Medical): No     Lack of Transportation (Non-Medical): No   Physical Activity: Sufficiently Active (2/5/2024)    Exercise Vital Sign     Days of Exercise per Week: 7 days     Minutes of Exercise per Session: 30 min   Stress: No Stress Concern Present (2/5/2024)    [employer] [employer] - Occupational Stress Questionnaire     Feeling of Stress : Only a little   Housing Stability: Low Risk  (2/5/2024)    Housing Stability Vital Sign     Unable to Pay for Housing in the Last Year: No     Number of Places Lived in the Last Year: 2     Unstable Housing in the Last Year: No       Review of patient's allergies indicates:  No Known Allergies    Living arrangements - the patient lives with their family.    Patient Employment       Status   Not Employed               Exam:     Vitals:    10/16/24 1000   BP: 129/84   BP Location: Left arm   Patient Position: Sitting   Pulse: 89   Weight: 74.5 kg (164 lb 3.9 oz)   Height: 5' 2" (1.575 m)          In general, the patient is well nourished and appears to be in no acute distress.    MENTAL STATUS: language is fluent, normal verbal comprehension, short-term and remote memory is intact, attention is normal, patient is alert and oriented x 3, fund of knowlege is appropriate by vocabulary. Behavior and judgment appropriate with full medical decision making capacity.     CRANIAL NERVE EXAM:  There is no intrernuclear ophthalmoplegia.  Extraocular muscles are intact. VFs intact. No facial asymmetry. Facial sensation is intact bilaterally. There is no dysarthria. Uvula is midline, and palate moves symmetrically. Shoulder shrug intact " "bilaterlly. Tongue protrusion is midline. Hearing is intact to finger rub bilaterally. Neck is supple with full ROM  No Lhermitte's    MOTOR EXAM: Normal bulk and tone throughout UE and LE bilaterally.   No pronator drift; rapid sequential movements are normal; Strength is  5/5 in all groups in the lower extremities and upper extremities    REFLEXES: 2+ and symmetric throughout in all four extremeties; no cross-adductors    SENSORY EXAM: Normal to light touch, vibration throughout.    COORDINATION: Normal finger-to-nose exam. Normal heel-to-shin exam.    GAIT: Narrow based and stable.    Negative Romberg's        10/16/2024    10:41 AM   Timed 25 Foot Walk:   Did patient wear an AFO? No   Was assistive device used? No   Time for 25 Foot Walk (seconds) 4.71   Time for 25 Foot Walk (seconds) 4.66       Imaging (personally reviewed):     MRI Brain 10/10/24: Several lesions in the cerebral white matter, similar to 5/30/2023, consistent with demyelinating disease.  No abnormal enhancement to suggest active demyelination.    MRI C-spine and T 10/10/24: Lesions in the cervical and thoracic spinal cord, similar to 5/30/2023, consistent with demyelinating disease.  No associated abnormal enhancement to suggest active demyelination.               Labs:     Lab Results   Component Value Date    [id number] 22 (L) 07/04/2013     No results found for: "JCVINDEX", "JCVANTIBODY"  No results found for: "VG6MEVHQ", "ABSOLUTECD3", "LO8DYQJQ", "ABSOLUTECD8", "FY7WNAQA", "ABSOLUTECD4", "LABCD48"  Lab Results   Component Value Date    WBC 7.72 02/06/2024    RBC 4.37 02/06/2024    HGB 12.6 02/06/2024    HCT 39.0 02/06/2024    MCV 89 02/06/2024    MCH 28.8 02/06/2024    MCHC 32.3 02/06/2024    RDW 12.6 02/06/2024     02/06/2024    MPV 9.7 02/06/2024    GRAN 4.7 02/06/2024    GRAN 60.6 02/06/2024    LYMPH 1.8 02/06/2024    LYMPH 23.3 02/06/2024    MONO 0.9 02/06/2024    MONO 11.1 02/06/2024    EOS 0.3 02/06/2024    BASO 0.07 " 02/06/2024    EOSINOPHIL 4.0 02/06/2024    BASOPHIL 0.9 02/06/2024     Sodium   Date Value Ref Range Status   02/06/2024 138 136 - 145 mmol/L Final     Potassium   Date Value Ref Range Status   02/06/2024 3.9 3.5 - 5.1 mmol/L Final     Chloride   Date Value Ref Range Status   02/06/2024 104 95 - 110 mmol/L Final     CO2   Date Value Ref Range Status   02/06/2024 28 23 - 29 mmol/L Final     Glucose   Date Value Ref Range Status   02/06/2024 97 70 - 110 mg/dL Final     BUN   Date Value Ref Range Status   02/06/2024 8 6 - 20 mg/dL Final     Creatinine   Date Value Ref Range Status   02/06/2024 0.7 0.5 - 1.4 mg/dL Final     Calcium   Date Value Ref Range Status   02/06/2024 9.2 8.7 - 10.5 mg/dL Final     Total Protein   Date Value Ref Range Status   02/06/2024 8.3 6.0 - 8.4 g/dL Final     Albumin   Date Value Ref Range Status   02/06/2024 3.9 3.5 - 5.2 g/dL Final     Total Bilirubin   Date Value Ref Range Status   02/06/2024 0.3 0.1 - 1.0 mg/dL Final     Comment:     For infants and newborns, interpretation of results should be based  on gestational age, weight and in agreement with clinical  observations.    Premature Infant recommended reference ranges:  Up to 24 hours.............<8.0 mg/dL  Up to 48 hours............<12.0 mg/dL  3-5 days..................<15.0 mg/dL  6-29 days.................<15.0 mg/dL       Alkaline Phosphatase   Date Value Ref Range Status   02/06/2024 71 55 - 135 U/L Final     AST   Date Value Ref Range Status   02/06/2024 43 (H) 10 - 40 U/L Final     ALT   Date Value Ref Range Status   02/06/2024 65 (H) 10 - 44 U/L Final     Anion Gap   Date Value Ref Range Status   02/06/2024 6 (L) 8 - 16 mmol/L Final     eGFR if    Date Value Ref Range Status   11/03/2020 >60 >60 mL/min/1.73 m^2 Final     eGFR if non    Date Value Ref Range Status   11/03/2020 >60 >60 mL/min/1.73 m^2 Final     Comment:     Calculation used to obtain the estimated glomerular filtration  rate  (eGFR) is the CKD-EPI equation.                   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   -Imaging: Repeat in Oct 2025, but check biomarkers every 6 months  -Disease Modifying Therapies: Discussed several higher efficacy options including [id number], patient prefers to stay on Rebif for now and monitor biomarker to guide switching in the future. Also discussed plegridy should she wishes for less frequent injections.   Symptom management   -Continue vitamin D 5000 IU daily   -Discussed brain health measures  Plan discussed and questions were answered to satisfaction.  Return to clinic in 6 months with CB .        NEURO MULTIPLE SCLEROSIS IMPRESSION:   Number of relapses in the past year?:  0  Clinical Progression:  Clinically Stable  MRI Progression:  Stable  MS Classification:  Relapsing-Remitting MS  DMT:  No change in management  Symptom Management:  No change in symptom management             Total time spent with the patient: 72 minutes, including face to face consultation, chart review and coordination of care, on the day of the visit. This includes face to face time and non-face to face time preparing to see the patient (eg, review of tests), obtaining and/or reviewing separately obtained history, documenting clinical information in the electronic or other health record, independently interpreting resultsand communicating results to the patient/family/caregiver, or care coordination.   I performed a neurobehavioral status examination that included a clinical assessment of thinking, reasoning, and judgment. Please see above HPI and ROS for full details.       Susy Pettit MD, MSc  Attending neurologist

## 2024-10-22 LAB
GFAP, PLASMA: 78.4 PG/ML (ref 0–57.4)
NEUROFILAMENT LIGHT CHAIN, PLASMA: <3 PG/ML

## 2024-10-29 ENCOUNTER — HOSPITAL ENCOUNTER (EMERGENCY)
Facility: HOSPITAL | Age: 34
Discharge: HOME | End: 2024-10-29
Attending: STUDENT IN AN ORGANIZED HEALTH CARE EDUCATION/TRAINING PROGRAM
Payer: COMMERCIAL

## 2024-10-29 VITALS
HEIGHT: 62 IN | DIASTOLIC BLOOD PRESSURE: 78 MMHG | BODY MASS INDEX: 30 KG/M2 | OXYGEN SATURATION: 99 % | SYSTOLIC BLOOD PRESSURE: 132 MMHG | TEMPERATURE: 98 F | WEIGHT: 163 LBS | RESPIRATION RATE: 14 BRPM | HEART RATE: 80 BPM

## 2024-10-29 DIAGNOSIS — M25.561 RIGHT KNEE PAIN: Primary | ICD-10-CM

## 2024-10-29 PROCEDURE — 99283 EMERGENCY DEPT VISIT LOW MDM: CPT | Mod: 25

## 2024-11-05 ENCOUNTER — OFFICE VISIT (OUTPATIENT)
Dept: ORTHOPEDICS | Facility: CLINIC | Age: 34
End: 2024-11-05
Payer: COMMERCIAL

## 2024-11-05 VITALS — HEIGHT: 62 IN | BODY MASS INDEX: 29.98 KG/M2 | WEIGHT: 162.94 LBS

## 2024-11-05 DIAGNOSIS — M23.91 ACUTE INTERNAL DERANGEMENT OF RIGHT KNEE: Primary | ICD-10-CM

## 2024-11-05 DIAGNOSIS — M25.561 RIGHT KNEE PAIN: ICD-10-CM

## 2024-11-05 PROCEDURE — 99203 OFFICE O/P NEW LOW 30 MIN: CPT | Mod: S$GLB,,, | Performed by: ORTHOPAEDIC SURGERY

## 2024-11-05 PROCEDURE — 1159F MED LIST DOCD IN RCRD: CPT | Mod: CPTII,S$GLB,, | Performed by: ORTHOPAEDIC SURGERY

## 2024-11-05 PROCEDURE — 99999 PR PBB SHADOW E&M-EST. PATIENT-LVL III: CPT | Mod: PBBFAC,,, | Performed by: ORTHOPAEDIC SURGERY

## 2024-11-05 PROCEDURE — 3008F BODY MASS INDEX DOCD: CPT | Mod: CPTII,S$GLB,, | Performed by: ORTHOPAEDIC SURGERY

## 2024-11-05 NOTE — PROGRESS NOTES
NEW PATIENT ORTHOPAEDIC: Knee    PRIMARY CARE PHYSICIAN: Alta Bowie MD   REFERRING PROVIDER: Jordan Newberry PA-C  [address] [place] [address]  [place] [address]     ASSESSMENT & PLAN:    Impression:  Right knee internal derangement     Non operative care:    Alejandra Garcia has physical exam evidence of above and wishes to pursue an non-operative care. I am recommending the following: Continue activities as tolerated. Patient presents with 1 week history of atraumatic right knee pain. Reports she was going up the stairs on 10/28 and felt a pop and immediately had pain. Was seen in the ED on 10/29/24, and since then the pain has started to improve. She has been wearing a knee brace, taking advil intermittently, and massaging the knee with icy hot daily with significant improvement. She does still experience some knee popping/clicking, but this is not painful at this time. Knee radiographs reviewed from the ED show no significant degenerative changes.  Discussed with her that if this is continuing to improve with OTC NSAIDs and activity modification, we can continue to give this time before pursuing further treatment options. Should the pain return, consider further imaging with MRI at that time. Return to clinic prn.     The patient has been ordered:  none    CONSULTS:   none    ACTIVE PROBLEM LIST  Patient Active Problem List   Diagnosis    Demyelinating changes in brain    Nystagmus    Hyperreflexia    Multiple sclerosis    MS (multiple sclerosis)    Urinary tract infection in mother during first trimester of pregnancy    Multilevel scoliosis     (vaginal birth after )    S/P tubal ligation           SUBJECTIVE    CHIEF COMPLAINT: Knee Pain    HPI:   Alejandra Garcia is a 34 y.o. female here for evaluation and management of right knee pain. Was going up stairs and felt a pop and immediately had pain. she has had progressive problems with the knee) multiple times a day over the past week  interfering with activities which include: exercise and climbing stairs.    Currently the pain in the joint is rated at 2 out of 10 with moderate activity.  The pain is intermittent and is located in the Right knee, at level of joint line, located medially without radiation. The pain is described as aching Relieving factors include a, rest, ice,  over the counter medication and repositioning.     Alejandra Garcia has no additional complaints.     PROGRESSIVE SYMPTOMS:  Pain worsened by weight bearing    FUNCTIONAL STATUS:   Does not currently use any ambulatory device.     PREVIOUS TREATMENTS:  Medical: OTC NSAIDS   Physical Therapy: Braces, Activities Modified   Previous Knee Surgery: none    REVIEW OF SYSTEMS:  PAIN ASSESSMENT:  See HPI.  MUSCULOSKELETAL: See HPI.      PAST MEDICAL HISTORY   has a past medical history of Menarche, Multiple sclerosis (), and S/P tubal ligation (10/25/2021).     PAST SURGICAL HISTORY   has a past surgical history that includes Cholecystectomy (); Vaginal birth after  section (2017);  section, low transverse (); post partem; and Laparoscopic salpingectomy (Bilateral, 10/25/2021).     FAMILY HISTORY  family history includes Asthma in her maternal grandmother; Diabetes in her father; Hypertension in her maternal grandmother and mother; Lupus in her mother.     SOCIAL HISTORY   reports that she has never smoked. She has never used smokeless tobacco. She reports that she does not drink alcohol and does not use drugs.     ALLERGIES:   Review of patient's allergies indicates:  No Known Allergies     MEDICATIONS:   Current Outpatient Medications on File Prior to Visit   Medication Sig Dispense Refill    acetaminophen (TYLENOL) 325 MG tablet Take 2 tablets (650 mg total) by mouth every 6 (six) hours as needed for Pain. 60 tablet 0    cholestyramine (QUESTRAN) 4 gram packet Take 1 packet (4 g total) by mouth 3 (three) times daily with meals. 270  packet 3    interferon beta-1a, albumin, (REBIF REBIDOSE) 44 mcg/0.5 mL PnIj Inject 44 mcg into the skin.      REBIF REBIDOSE 44 mcg/0.5 mL PnIj INJECT 0.5ML SUBCUTANEOUSLY 3 TIMES WEEKLY      REBIF, WITH ALBUMIN, 44 mcg/0.5 mL injection Inject into the skin.      vitamin D 1000 units Tab Take 5,000 Units by mouth once daily.       No current facility-administered medications on file prior to visit.          PHYSICAL EXAM   vitals were not taken for this visit.     All other systems deferred.  GENERAL:  No acute distress  HABITUS: Normal  GAIT: Antalgic  SKIN: No erythema, warmth, fluctuance. Swelling absent     KNEE EXAM:    right:   Effusion: No joint effusion  TTP: No tenderness to palpation over medial, lateral joint lines. No MCL/LCL tenderness.   Passive ROM: Extension 0, Flexion 130  No crepitus with passive knee ROM  No pain with manipulation of patella  Stable to varus/valgus stress. No increased laxity to anterior/posterior drawer testing  Negative Adolph's test  No pain with IR/ER rotation of the hip  5/5 strength in knee flexion and extension, ankle plantarflexion and dorsiflexion  Neurovascular Status: Sensation intact to light touch in Sural, saphenous, SPN, DPN, Tibial nerve distribution  2+ pulse DP/PT, normal capillary refill, foot has normal warmth    DATA:  Diagnostic tests reviewed for today's visit:     The obtained knee radiographs appears normal for age. There is good alignment with no evidence of fracture, bony abnormalities or signficant degenerative changes.

## 2024-12-16 ENCOUNTER — PATIENT MESSAGE (OUTPATIENT)
Dept: PSYCHIATRY | Facility: CLINIC | Age: 34
End: 2024-12-16
Payer: COMMERCIAL

## 2025-02-24 ENCOUNTER — OFFICE VISIT (OUTPATIENT)
Dept: FAMILY MEDICINE | Facility: CLINIC | Age: 35
End: 2025-02-24
Payer: COMMERCIAL

## 2025-02-24 VITALS
DIASTOLIC BLOOD PRESSURE: 80 MMHG | WEIGHT: 165 LBS | BODY MASS INDEX: 30.36 KG/M2 | HEART RATE: 79 BPM | OXYGEN SATURATION: 99 % | SYSTOLIC BLOOD PRESSURE: 124 MMHG | TEMPERATURE: 98 F | HEIGHT: 62 IN

## 2025-02-24 DIAGNOSIS — Z00.00 ANNUAL PHYSICAL EXAM: Primary | ICD-10-CM

## 2025-02-24 PROCEDURE — 3074F SYST BP LT 130 MM HG: CPT | Mod: CPTII,S$GLB,, | Performed by: FAMILY MEDICINE

## 2025-02-24 PROCEDURE — 99999 PR PBB SHADOW E&M-EST. PATIENT-LVL III: CPT | Mod: PBBFAC,,, | Performed by: FAMILY MEDICINE

## 2025-02-24 PROCEDURE — 3008F BODY MASS INDEX DOCD: CPT | Mod: CPTII,S$GLB,, | Performed by: FAMILY MEDICINE

## 2025-02-24 PROCEDURE — 1159F MED LIST DOCD IN RCRD: CPT | Mod: CPTII,S$GLB,, | Performed by: FAMILY MEDICINE

## 2025-02-24 PROCEDURE — 3079F DIAST BP 80-89 MM HG: CPT | Mod: CPTII,S$GLB,, | Performed by: FAMILY MEDICINE

## 2025-02-24 PROCEDURE — 99395 PREV VISIT EST AGE 18-39: CPT | Mod: S$GLB,,, | Performed by: FAMILY MEDICINE

## 2025-02-24 NOTE — PROGRESS NOTES
"Subjective     Patient ID: Alejandra Garcia is a 34 y.o. female.    Chief Complaint: Annual Exam    34 year old female presents for an annual exam.    She had a recent cold and has some pain wwith deep breaths.         History of Present Illness             Past Medical History:   Diagnosis Date    Menarche     Age of onset 12    Multiple sclerosis 2013    S/P tubal ligation 10/25/2021      Past Surgical History:   Procedure Laterality Date     SECTION, LOW TRANSVERSE      Breech in labor    CHOLECYSTECTOMY      LAPAROSCOPIC SALPINGECTOMY Bilateral 10/25/2021    Procedure: SALPINGECTOMY, LAPAROSCOPIC;  Surgeon: Mima Bains MD;  Location: [facility];  Service: OB/GYN;  Laterality: Bilateral;    post partem      6 weeks     VAGINAL BIRTH AFTER  SECTION  2017     Family History   Problem Relation Name Age of Onset    Lupus Mother      Hypertension Mother      Diabetes Father      Hypertension Maternal Grandmother      Asthma Maternal Grandmother      Breast cancer Neg Hx      Colon cancer Neg Hx      Ovarian cancer Neg Hx      Multiple sclerosis Neg Hx       Social History[1]    Review of Systems   Constitutional:  Negative for chills, fatigue and fever.   Respiratory:  Positive for cough. Negative for chest tightness, shortness of breath and wheezing.    Cardiovascular:  Positive for chest pain. Negative for palpitations and leg swelling.   Gastrointestinal:  Positive for abdominal pain. Negative for constipation, diarrhea, vomiting and reflux.        LUQ abdominal pain that coems and goes. It is fleeting and lasts seconds.    Neurological:  Negative for dizziness, syncope, light-headedness and headaches.            Objective     Vitals:    25 1120   BP: 124/80   Pulse: 79   Temp: 98.2 °F (36.8 °C)   TempSrc: Oral   SpO2: 99%   Weight: 74.8 kg (165 lb)   Height: 5' 2" (1.575 m)        Physical Exam  Constitutional:       General: She is not in acute distress.     " Appearance: Normal appearance. She is well-developed. She is not ill-appearing, toxic-appearing or diaphoretic.   HENT:      Head: Normocephalic.      Right Ear: External ear normal.      Left Ear: External ear normal.      Mouth/Throat:      Pharynx: No oropharyngeal exudate.   Eyes:      Conjunctiva/sclera: Conjunctivae normal.   Neck:      Thyroid: No thyromegaly.   Cardiovascular:      Rate and Rhythm: Normal rate and regular rhythm.      Heart sounds: Normal heart sounds. No murmur heard.     No friction rub. No gallop.   Pulmonary:      Effort: Pulmonary effort is normal. No respiratory distress.      Breath sounds: Normal breath sounds. No stridor. No wheezing, rhonchi or rales.   Abdominal:      General: Bowel sounds are normal. There is no distension.      Palpations: Abdomen is soft. There is no mass.      Tenderness: There is no abdominal tenderness. There is no guarding or rebound.      Hernia: No hernia is present.   Musculoskeletal:      Cervical back: Normal range of motion and neck supple.      Right lower leg: No edema.      Left lower leg: No edema.   Lymphadenopathy:      Cervical: No cervical adenopathy.   Skin:     Findings: No rash.   Neurological:      General: No focal deficit present.      Mental Status: She is alert and oriented to person, place, and time.   Psychiatric:         Mood and Affect: Mood normal.         Behavior: Behavior normal.       Physical Exam                Assessment and Plan     1. Annual physical exam  -     CBC Auto Differential; Future; Expected date: 02/24/2025  -     Comprehensive Metabolic Panel; Future; Expected date: 02/24/2025  -     Lipid Panel; Future; Expected date: 02/24/2025  -     Hemoglobin A1C; Future; Expected date: 02/24/2025  -     TSH; Future; Expected date: 02/24/2025      Alejandra was seen today for annual exam.    Diagnoses and all orders for this visit:    Annual physical exam  -     CBC Auto Differential; Future  -     Comprehensive Metabolic  Panel; Future  -     Lipid Panel; Future  -     Hemoglobin A1C; Future  -     TSH; Future        Assessment & Plan                      No follow-ups on file.        This note was generated with the assistance of ambient listening technology. Verbal consent was obtained by the patient and accompanying visitor(s) for the recording of patient appointment to facilitate this note. I attest to having reviewed and edited the generated note for accuracy, though some syntax or spelling errors may persist. Please contact the author of this note for any clarification.         [1]   Social History  Socioeconomic History    Marital status:    Tobacco Use    Smoking status: Never    Smokeless tobacco: Never   Substance and Sexual Activity    Alcohol use: No    Drug use: No    Sexual activity: Yes     Partners: Male     Birth control/protection: None, Condom     Comment:  since 2009     Social Drivers of Health     Financial Resource Strain: Low Risk  (2/18/2025)    Overall Financial Resource Strain (CARDIA)     Difficulty of Paying Living Expenses: Not hard at all   Food Insecurity: No Food Insecurity (2/18/2025)    Hunger Vital Sign     Worried About Running Out of Food in the Last Year: Never true     Ran Out of Food in the Last Year: Never true   Transportation Needs: No Transportation Needs (2/18/2025)    PRAPARE - Transportation     Lack of Transportation (Medical): No     Lack of Transportation (Non-Medical): No   Physical Activity: Sufficiently Active (2/18/2025)    Exercise Vital Sign     Days of Exercise per Week: 4 days     Minutes of Exercise per Session: 40 min   Stress: No Stress Concern Present (2/18/2025)    [employer] - Occupational Stress Questionnaire     Feeling of Stress : Not at all   Housing Stability: Low Risk  (2/18/2025)    Housing Stability Vital Sign     Unable to Pay for Housing in the Last Year: No     Number of Times Moved in the Last Year: 0     Homeless  in the Last Year: No

## 2025-02-25 ENCOUNTER — RESULTS FOLLOW-UP (OUTPATIENT)
Dept: FAMILY MEDICINE | Facility: CLINIC | Age: 35
End: 2025-02-25

## 2025-03-07 ENCOUNTER — PATIENT MESSAGE (OUTPATIENT)
Dept: PSYCHIATRY | Facility: CLINIC | Age: 35
End: 2025-03-07
Payer: COMMERCIAL

## 2025-03-12 DIAGNOSIS — M79.642 LEFT HAND PAIN: Primary | ICD-10-CM

## 2025-03-19 ENCOUNTER — APPOINTMENT (OUTPATIENT)
Dept: RADIOLOGY | Facility: HOSPITAL | Age: 35
End: 2025-03-19
Payer: COMMERCIAL

## 2025-03-19 ENCOUNTER — OFFICE VISIT (OUTPATIENT)
Dept: ORTHOPEDICS | Facility: CLINIC | Age: 35
End: 2025-03-19
Payer: COMMERCIAL

## 2025-03-19 VITALS — HEIGHT: 62 IN | BODY MASS INDEX: 30.34 KG/M2 | WEIGHT: 164.88 LBS

## 2025-03-19 DIAGNOSIS — M65.4 DE QUERVAIN'S TENOSYNOVITIS, LEFT: Primary | ICD-10-CM

## 2025-03-19 DIAGNOSIS — M79.642 LEFT HAND PAIN: Primary | ICD-10-CM

## 2025-03-19 DIAGNOSIS — M79.642 LEFT HAND PAIN: ICD-10-CM

## 2025-03-19 PROCEDURE — 99214 OFFICE O/P EST MOD 30 MIN: CPT | Mod: S$GLB,,,

## 2025-03-19 PROCEDURE — 3044F HG A1C LEVEL LT 7.0%: CPT | Mod: CPTII,S$GLB,,

## 2025-03-19 PROCEDURE — 73130 X-RAY EXAM OF HAND: CPT | Mod: TC,FY,PN,LT

## 2025-03-19 PROCEDURE — 73130 X-RAY EXAM OF HAND: CPT | Mod: 26,LT,, | Performed by: RADIOLOGY

## 2025-03-19 PROCEDURE — 1160F RVW MEDS BY RX/DR IN RCRD: CPT | Mod: CPTII,S$GLB,,

## 2025-03-19 PROCEDURE — 1159F MED LIST DOCD IN RCRD: CPT | Mod: CPTII,S$GLB,,

## 2025-03-19 PROCEDURE — 3008F BODY MASS INDEX DOCD: CPT | Mod: CPTII,S$GLB,,

## 2025-03-19 PROCEDURE — 99999 PR PBB SHADOW E&M-EST. PATIENT-LVL III: CPT | Mod: PBBFAC,,,

## 2025-03-19 RX ORDER — MELOXICAM 15 MG/1
15 TABLET ORAL DAILY
Qty: 30 TABLET | Refills: 1 | Status: SHIPPED | OUTPATIENT
Start: 2025-03-19

## 2025-03-19 NOTE — PROGRESS NOTES
[facility] Patient, New Problem Visit: Upper Extremity    SUBJECTIVE:      Chief Complaint:   Chief Complaint   Patient presents with    Hand Pain     Left hand pain        Referring Provider: N/A    History of Present Illness:  Alejandra Garcia is a 34 y.o. left hand dominant female who presents to clinic today with left hand/wrist pain.  Onset of symptoms was  4 weeks ago. Patient denies known CLARITA or trauma to the area. Patient locates pain over (points to) the dorsal base of thumb  and wrist. Symptoms consist of intermittent pain and swelling. The patient describes a moderate pain. Symptoms are aggravated by activity and movement. Worse with repetitive activities like folding laundry, cooking, typing, and lifting up on her son. Also exacerbated by rotational movements like opening a jar. Symptoms are alleviated by rest and massaging, icy hot . Denies numbness, tingling, radiation. No locking, clicking, popping. No loss of  strength.   Patient denies any prior history of OT, corticosteroid injections, or surgeries to the wrist or hand.    The patient is a .    The patient denies any fevers, chills, N/V, D/C and presents for evaluation.    Vitals:    25 0921   PainSc:   4       Past Medical History:   Diagnosis Date    Menarche     Age of onset 12    Multiple sclerosis     S/P tubal ligation 10/25/2021     Past Surgical History:   Procedure Laterality Date     SECTION, LOW TRANSVERSE      Breech in labor    CHOLECYSTECTOMY  2015    LAPAROSCOPIC SALPINGECTOMY Bilateral 10/25/2021    Procedure: SALPINGECTOMY, LAPAROSCOPIC;  Surgeon: Mima Bains MD;  Location: [facility];  Service: OB/GYN;  Laterality: Bilateral;    post partem      6 weeks     VAGINAL BIRTH AFTER  SECTION  2017     Review of patient's allergies indicates:  No Known Allergies  Social History     Social History Narrative    Not on file     Family History   Problem Relation Name  "Age of Onset    Lupus Mother      Hypertension Mother      Diabetes Father      Hypertension Maternal Grandmother      Asthma Maternal Grandmother      Breast cancer Neg Hx      Colon cancer Neg Hx      Ovarian cancer Neg Hx      Multiple sclerosis Neg Hx         Current Medications[1]    ROS  Review of Systems:  Constitutional: no fever or chills  Eyes: no visual changes  ENT: no nasal congestion or sore throat  Respiratory: no cough or shortness of breath  Cardiovascular: no chest pain  Gastrointestinal: no nausea or vomiting, tolerating diet  Musculoskeletal: pain and soreness    OBJECTIVE:      Vital Signs (Most Recent):  Vitals:    03/19/25 0921   Weight: 74.8 kg (164 lb 14.5 oz)   Height: 5' 2" (1.575 m)     Body mass index is 30.16 kg/m².    Physical Exam:  Constitutional: The patient appears well-developed and well-nourished. No distress.   Head: Normocephalic and atraumatic.   Nose: Nose normal.   Eyes: Conjunctivae and EOM are normal.   Neck: No tracheal deviation present.   Cardiovascular: Normal rate and intact distal pulses.    Pulmonary/Chest: Effort normal. No respiratory distress.   Abdominal: There is no guarding.   Lymphatic: Negative for adenopathy   Neurological: The patient is alert.   Psychiatric: The patient has a normal mood and affect.     Bilateral Hand/Wrist Examination:     Observation/Inspection:  Swelling                       left 1st dorsal extensor compartment               Deformity                     none  Discoloration               none                  Scars                           none                  Atrophy                        none     HAND/WRIST EXAMINATION:  Finkelstein's Test                                Pos left wrist  WHAT Test                                         Pos left wrist  CMC grind                                           Neg  Circumduction test                              Neg   strength normal     Tenderness to palpation left 1st dorsal " extensor compartment, otherwise bilateral ROM hand/wrist/elbow full, painless     Neurovascular Exam:  Digits WWP, brisk CR < 3s throughout  2+ biceps and brachioradialis reflexes  NVI motor/LTS to M/R/U nerves, radial pulse 2+  Tinel's Test - Carpal Tunnel                Neg  Tinel's Test - Cubital Tunnel               Neg  Phalen's Test                                      Neg  Median Nerve Compression Test       Neg      Diagnostic Studies and other clinical records Review:      Imaging:   I independently viewed the patient's imaging as well as the radiology report.    My personal interpretation of X-rays AP, lateral, and oblique of left hand  demonstrate well preserved cartilage spaces with no fracture, dislocation, or ligamentous instability.     ASSESSMENT/PLAN:    34 y.o. yo female with   Encounter Diagnosis   Name Primary?    De Quervain's tenosynovitis, left Yes      The patient and I had a thorough discussion today. We discussed the working diagnosis as well as several other potential alternative diagnoses. Treatment options were discussed, both conservative and surgical. Conservative treatment options would include things such as activity modifications, workplace modifications, a period of rest, oral vs topical OTC and prescription anti-inflammatory medications, occupational therapy, splinting/bracing, immobilization, corticosteroid injections, and others. Surgical options were discussed as well.     Wrist brace provided today   Mobic 15 mg PO QD x 2 weeks then PRN. The patient was advised that NSAID-type medications have important potential side effects: gastrointestinal irritation, GI bleeding, cardiac effects and renal injuries. Take the medication with food and to stop and call the office for any GI upset, vomiting, abdominal pain or black/bloody stools. The patient expresses understanding of these issues and questions were answered.  Offered injection, she defers at this time and would prefer to try  bracing and oral anti-inflammatory   Follow up in 4 weeks or sooner if needed  Call with any questions/concerns in the interim    The patient's pathophysiology was explained in detail with reference to x-rays, models, other visual aids as appropriate. Treatment options were discussed in detail.  All questions addressed to the patient's satisfaction.     Africa Hubbard PA-C  [facility]       [1]   Current Outpatient Medications:     acetaminophen (TYLENOL) 325 MG tablet, Take 2 tablets (650 mg total) by mouth every 6 (six) hours as needed for Pain., Disp: 60 tablet, Rfl: 0    cholestyramine (QUESTRAN) 4 gram packet, Take 1 packet (4 g total) by mouth 3 (three) times daily with meals., Disp: 270 packet, Rfl: 3    interferon beta-1a, albumin, (REBIF REBIDOSE) 44 mcg/0.5 mL PnIj, Inject 44 mcg into the skin., Disp: , Rfl:     REBIF REBIDOSE 44 mcg/0.5 mL PnIj, INJECT 0.5ML SUBCUTANEOUSLY 3 TIMES WEEKLY, Disp: , Rfl:     REBIF, WITH ALBUMIN, 44 mcg/0.5 mL injection, Inject into the skin., Disp: , Rfl:     vitamin D 1000 units Tab, Take 5,000 Units by mouth once daily., Disp: , Rfl:

## 2025-04-03 ENCOUNTER — PATIENT MESSAGE (OUTPATIENT)
Dept: PSYCHIATRY | Facility: CLINIC | Age: 35
End: 2025-04-03
Payer: COMMERCIAL

## 2025-04-21 ENCOUNTER — OFFICE VISIT (OUTPATIENT)
Dept: ORTHOPEDICS | Facility: CLINIC | Age: 35
End: 2025-04-21
Payer: COMMERCIAL

## 2025-04-21 DIAGNOSIS — M79.642 LEFT HAND PAIN: ICD-10-CM

## 2025-04-21 DIAGNOSIS — M65.4 DE QUERVAIN'S TENOSYNOVITIS, LEFT: Primary | ICD-10-CM

## 2025-04-21 PROCEDURE — 1159F MED LIST DOCD IN RCRD: CPT | Mod: CPTII,S$GLB,,

## 2025-04-21 PROCEDURE — 3044F HG A1C LEVEL LT 7.0%: CPT | Mod: CPTII,S$GLB,,

## 2025-04-21 PROCEDURE — 99213 OFFICE O/P EST LOW 20 MIN: CPT | Mod: S$GLB,,,

## 2025-04-21 PROCEDURE — 99999 PR PBB SHADOW E&M-EST. PATIENT-LVL III: CPT | Mod: PBBFAC,,,

## 2025-04-21 NOTE — PROGRESS NOTES
[facility], New Problem Visit: Upper Extremity    SUBJECTIVE:      Chief Complaint:   Chief Complaint   Patient presents with    Left Hand - Pain, Swelling       Referring Provider: N/A    Initial visit 03/19/2025 History of Present Illness:  Alejandra Garcia is a 34 y.o. left hand dominant female who presents to clinic today with left hand/wrist pain.  Onset of symptoms was  4 weeks ago. Patient denies known CLARITA or trauma to the area. Patient locates pain over (points to) the dorsal base of thumb  and wrist. Symptoms consist of intermittent pain and swelling. The patient describes a moderate pain. Symptoms are aggravated by activity and movement. Worse with repetitive activities like folding laundry, cooking, typing, and lifting up on her son. Also exacerbated by rotational movements like opening a jar. Symptoms are alleviated by rest and massaging, icy hot . Denies numbness, tingling, radiation. No locking, clicking, popping. No loss of  strength.   Patient denies any prior history of OT, corticosteroid injections, or surgeries to the wrist or hand.    Interval history 04/21/2025:  Patient returns today in regards to left hand/wrist pain.  Reports her pain is somewhat improved.  Still feels like her symptoms are exacerbated mostly when lifting up on her son and taking photos.  She has been wearing the wrist brace which does help somewhat, but she has not been diligent with this.  She has been taking the meloxicam which she reports has helped.  Overall, she is wondering what the next step is.  She continues to express hesitation in regards to injections.    The patient is a .    The patient denies any fevers, chills, N/V, D/C and presents for evaluation.    There were no vitals filed for this visit.      Past Medical History:   Diagnosis Date    Menarche     Age of onset 12    Multiple sclerosis 2013    S/P tubal ligation 10/25/2021     Past Surgical History:   Procedure Laterality Date      SECTION, LOW TRANSVERSE      Breech in labor    CHOLECYSTECTOMY      LAPAROSCOPIC SALPINGECTOMY Bilateral 10/25/2021    Procedure: SALPINGECTOMY, LAPAROSCOPIC;  Surgeon: Mima Bains MD;  Location: [facility];  Service: OB/GYN;  Laterality: Bilateral;    post partem      6 weeks     VAGINAL BIRTH AFTER  SECTION  2017     Review of patient's allergies indicates:  No Known Allergies  Social History     Social History Narrative    Not on file     Family History   Problem Relation Name Age of Onset    Lupus Mother      Hypertension Mother      Diabetes Father      Hypertension Maternal Grandmother      Asthma Maternal Grandmother      Breast cancer Neg Hx      Colon cancer Neg Hx      Ovarian cancer Neg Hx      Multiple sclerosis Neg Hx         Current Medications[1]    ROS  Review of Systems:  Constitutional: no fever or chills  Eyes: no visual changes  ENT: no nasal congestion or sore throat  Respiratory: no cough or shortness of breath  Cardiovascular: no chest pain  Gastrointestinal: no nausea or vomiting, tolerating diet  Musculoskeletal: pain and soreness    OBJECTIVE:      Vital Signs (Most Recent):  There were no vitals filed for this visit.    There is no height or weight on file to calculate BMI.    Physical Exam:  Constitutional: The patient appears well-developed and well-nourished. No distress.   Head: Normocephalic and atraumatic.   Nose: Nose normal.   Eyes: Conjunctivae and EOM are normal.   Neck: No tracheal deviation present.   Cardiovascular: Normal rate and intact distal pulses.    Pulmonary/Chest: Effort normal. No respiratory distress.   Abdominal: There is no guarding.   Lymphatic: Negative for adenopathy   Neurological: The patient is alert.   Psychiatric: The patient has a normal mood and affect.     Bilateral Hand/Wrist Examination:     Observation/Inspection:  Swelling                       left 1st dorsal extensor compartment                Deformity                     none  Discoloration               none                  Scars                           none                  Atrophy                        none     HAND/WRIST EXAMINATION:  Finkelstein's Test                                Pos left wrist  WHAT Test                                         Pos left wrist  CMC grind                                           Neg  Circumduction test                              Neg   strength normal     Tenderness to palpation left 1st dorsal extensor compartment, otherwise bilateral ROM hand/wrist/elbow full, painless     Neurovascular Exam:  Digits WWP, brisk CR < 3s throughout  2+ biceps and brachioradialis reflexes  NVI motor/LTS to M/R/U nerves, radial pulse 2+  Tinel's Test - Carpal Tunnel                Neg  Tinel's Test - Cubital Tunnel               Neg  Phalen's Test                                      Neg  Median Nerve Compression Test       Neg      Diagnostic Studies and other clinical records Review:      Imaging:  No new imaging obtained today, previous imaging reviewed  I independently viewed the patient's imaging as well as the radiology report.    My personal interpretation of X-rays AP, lateral, and oblique of left hand  demonstrate well preserved cartilage spaces with no fracture, dislocation, or ligamentous instability.     ASSESSMENT/PLAN:    34 y.o. yo female with   Encounter Diagnoses   Name Primary?    De Quervain's tenosynovitis, left Yes    Left hand pain         The patient and I had a thorough discussion today. We discussed the working diagnosis as well as several other potential alternative diagnoses. Treatment options were discussed, both conservative and surgical. Conservative treatment options would include things such as activity modifications, workplace modifications, a period of rest, oral vs topical OTC and prescription anti-inflammatory medications, occupational therapy, splinting/bracing, immobilization,  corticosteroid injections, and others. Surgical options were discussed as well.  At this time, she is fearful of trying injection.  Would like to exhaust all conservative methods prior to considering injection therapy.  However, if she does want to move forward with steroid injection in the future would prefer to discuss with her neurologist to ensure steroid would not interact with her interferon beta-1a albumin treatment for MS.    Continue wrist bracing  Okay to continue Mobic or over-the-counter anti-inflammatories as needed.  Can also trial topical anti-inflammatories such as over-the-counter Voltaren gel  Follow up in 6 weeks or sooner if needed.  If still symptomatic, can continue with bracing and topical/oral medications, reconsider injections, or further discuss occupational therapy.  Call with any questions/concerns in the interim    The patient's pathophysiology was explained in detail with reference to x-rays, models, other visual aids as appropriate. Treatment options were discussed in detail.  All questions addressed to the patient's satisfaction.     Africa Hubbard PA-C  [facility]           [1]   Current Outpatient Medications:     acetaminophen (TYLENOL) 325 MG tablet, Take 2 tablets (650 mg total) by mouth every 6 (six) hours as needed for Pain., Disp: 60 tablet, Rfl: 0    cholestyramine (QUESTRAN) 4 gram packet, Take 1 packet (4 g total) by mouth 3 (three) times daily with meals., Disp: 270 packet, Rfl: 3    interferon beta-1a, albumin, (REBIF REBIDOSE) 44 mcg/0.5 mL PnIj, Inject 44 mcg into the skin., Disp: , Rfl:     meloxicam (MOBIC) 15 MG tablet, Take 1 tablet (15 mg total) by mouth once daily., Disp: 30 tablet, Rfl: 1    REBIF REBIDOSE 44 mcg/0.5 mL PnIj, INJECT 0.5ML SUBCUTANEOUSLY 3 TIMES WEEKLY, Disp: , Rfl:     REBIF, WITH ALBUMIN, 44 mcg/0.5 mL injection, Inject into the skin., Disp: , Rfl:     vitamin D 1000 units Tab, Take 5,000 Units by mouth once daily., Disp: ,  Rfl:

## 2025-05-13 ENCOUNTER — PATIENT MESSAGE (OUTPATIENT)
Dept: PSYCHIATRY | Facility: CLINIC | Age: 35
End: 2025-05-13
Payer: COMMERCIAL

## 2025-06-19 ENCOUNTER — PATIENT MESSAGE (OUTPATIENT)
Dept: PSYCHIATRY | Facility: CLINIC | Age: 35
End: 2025-06-19
Payer: COMMERCIAL

## 2025-07-30 ENCOUNTER — PATIENT MESSAGE (OUTPATIENT)
Dept: PSYCHIATRY | Facility: CLINIC | Age: 35
End: 2025-07-30
Payer: COMMERCIAL

## 2025-08-01 ENCOUNTER — PATIENT MESSAGE (OUTPATIENT)
Dept: PSYCHIATRY | Facility: CLINIC | Age: 35
End: 2025-08-01
Payer: COMMERCIAL

## 2025-08-18 ENCOUNTER — PATIENT MESSAGE (OUTPATIENT)
Dept: PSYCHIATRY | Facility: CLINIC | Age: 35
End: 2025-08-18
Payer: COMMERCIAL

## 2025-08-19 ENCOUNTER — TELEPHONE (OUTPATIENT)
Dept: OBSTETRICS AND GYNECOLOGY | Facility: CLINIC | Age: 35
End: 2025-08-19
Payer: COMMERCIAL

## (undated) DEVICE — KIT WING PAD POSITIONING

## (undated) DEVICE — Device

## (undated) DEVICE — SUT MCRYL PLUS 4-0 PS2 27IN

## (undated) DEVICE — IRRIGATOR ENDOSCOPY DISP.

## (undated) DEVICE — PAD PERINEAL SUPINE

## (undated) DEVICE — GLOVE BIOGEL SKINSENSE PI 6.5

## (undated) DEVICE — TROCAR KII FIOS 5MM X 100MM

## (undated) DEVICE — NDL INSUF ULTRA VERESS 120MM

## (undated) DEVICE — SOL PVP-I SCRUB 7.5% 4OZ

## (undated) DEVICE — TOWEL OR DISP STRL BLUE 4/PK

## (undated) DEVICE — PACK LAPAROSCOPY BAPTIST

## (undated) DEVICE — ELECTRODE REM PLYHSV RETURN 9

## (undated) DEVICE — SYR 10CC LUER LOCK

## (undated) DEVICE — GELPOINT MINI KIT ENDOSCOPIC

## (undated) DEVICE — SUT VICRYL+ 27 UR-6 VIOL

## (undated) DEVICE — SEE MEDLINE ITEM 146296

## (undated) DEVICE — GLOVE BIOGEL SKINSENSE PI 7.0

## (undated) DEVICE — SOL NS 1000CC

## (undated) DEVICE — SOL NACL STRL BOTTLE 1000ML

## (undated) DEVICE — SOL BETADINE 5%

## (undated) DEVICE — JELLY SURGILUBE 5GR

## (undated) DEVICE — SYS SEE SHARP SCOPE ANTIFOG

## (undated) DEVICE — TIP RUMI BLUE DISPOSABLE 5/BX